# Patient Record
Sex: FEMALE | Race: WHITE | NOT HISPANIC OR LATINO | Employment: OTHER | URBAN - METROPOLITAN AREA
[De-identification: names, ages, dates, MRNs, and addresses within clinical notes are randomized per-mention and may not be internally consistent; named-entity substitution may affect disease eponyms.]

---

## 2017-03-09 ENCOUNTER — HOSPITAL ENCOUNTER (EMERGENCY)
Facility: CLINIC | Age: 82
Discharge: HOME | End: 2017-03-09
Attending: EMERGENCY MEDICINE | Admitting: EMERGENCY MEDICINE
Payer: MEDICARE

## 2017-03-09 ENCOUNTER — CARE COORDINATION (OUTPATIENT)
Dept: CARDIOLOGY | Facility: CLINIC | Age: 82
End: 2017-03-09

## 2017-03-09 ENCOUNTER — APPOINTMENT (OUTPATIENT)
Dept: CT IMAGING | Facility: CLINIC | Age: 82
End: 2017-03-09
Attending: EMERGENCY MEDICINE
Payer: MEDICARE

## 2017-03-09 ENCOUNTER — TELEPHONE (OUTPATIENT)
Dept: INTERNAL MEDICINE | Facility: CLINIC | Age: 82
End: 2017-03-09

## 2017-03-09 VITALS
DIASTOLIC BLOOD PRESSURE: 66 MMHG | SYSTOLIC BLOOD PRESSURE: 122 MMHG | HEART RATE: 70 BPM | WEIGHT: 123.9 LBS | BODY MASS INDEX: 22.66 KG/M2 | RESPIRATION RATE: 16 BRPM | OXYGEN SATURATION: 99 % | TEMPERATURE: 98.6 F

## 2017-03-09 DIAGNOSIS — I48.0 PAROXYSMAL ATRIAL FIBRILLATION (H): ICD-10-CM

## 2017-03-09 LAB
ANION GAP SERPL CALCULATED.3IONS-SCNC: 9 MMOL/L (ref 3–14)
BASOPHILS # BLD AUTO: 0.1 10E9/L (ref 0–0.2)
BASOPHILS NFR BLD AUTO: 0.9 %
BUN SERPL-MCNC: 18 MG/DL (ref 7–30)
CALCIUM SERPL-MCNC: 8.4 MG/DL (ref 8.5–10.1)
CHLORIDE SERPL-SCNC: 105 MMOL/L (ref 94–109)
CO2 SERPL-SCNC: 27 MMOL/L (ref 20–32)
CREAT SERPL-MCNC: 0.53 MG/DL (ref 0.52–1.04)
D DIMER PPP FEU-MCNC: 0.9 UG/ML FEU (ref 0–0.5)
DIFFERENTIAL METHOD BLD: NORMAL
EOSINOPHIL # BLD AUTO: 0.1 10E9/L (ref 0–0.7)
EOSINOPHIL NFR BLD AUTO: 1.2 %
ERYTHROCYTE [DISTWIDTH] IN BLOOD BY AUTOMATED COUNT: 12.6 % (ref 10–15)
GFR SERPL CREATININE-BSD FRML MDRD: ABNORMAL ML/MIN/1.7M2
GLUCOSE SERPL-MCNC: 161 MG/DL (ref 70–99)
HCT VFR BLD AUTO: 39.1 % (ref 35–47)
HGB BLD-MCNC: 12.5 G/DL (ref 11.7–15.7)
IMM GRANULOCYTES # BLD: 0.1 10E9/L (ref 0–0.4)
IMM GRANULOCYTES NFR BLD: 1 %
LYMPHOCYTES # BLD AUTO: 2.7 10E9/L (ref 0.8–5.3)
LYMPHOCYTES NFR BLD AUTO: 30.4 %
MCH RBC QN AUTO: 29.1 PG (ref 26.5–33)
MCHC RBC AUTO-ENTMCNC: 32 G/DL (ref 31.5–36.5)
MCV RBC AUTO: 91 FL (ref 78–100)
MONOCYTES # BLD AUTO: 0.8 10E9/L (ref 0–1.3)
MONOCYTES NFR BLD AUTO: 8.3 %
NEUTROPHILS # BLD AUTO: 5.3 10E9/L (ref 1.6–8.3)
NEUTROPHILS NFR BLD AUTO: 58.2 %
NRBC # BLD AUTO: 0 10*3/UL
NRBC BLD AUTO-RTO: 0 /100
PLATELET # BLD AUTO: 392 10E9/L (ref 150–450)
POTASSIUM SERPL-SCNC: 3.9 MMOL/L (ref 3.4–5.3)
RBC # BLD AUTO: 4.3 10E12/L (ref 3.8–5.2)
SODIUM SERPL-SCNC: 141 MMOL/L (ref 133–144)
TROPONIN I SERPL-MCNC: NORMAL UG/L (ref 0–0.04)
TSH SERPL DL<=0.05 MIU/L-ACNC: 2 MU/L (ref 0.4–4)
WBC # BLD AUTO: 9 10E9/L (ref 4–11)

## 2017-03-09 PROCEDURE — 80048 BASIC METABOLIC PNL TOTAL CA: CPT | Performed by: EMERGENCY MEDICINE

## 2017-03-09 PROCEDURE — 25000128 H RX IP 250 OP 636: Performed by: EMERGENCY MEDICINE

## 2017-03-09 PROCEDURE — 99285 EMERGENCY DEPT VISIT HI MDM: CPT | Mod: 25

## 2017-03-09 PROCEDURE — 85025 COMPLETE CBC W/AUTO DIFF WBC: CPT | Performed by: EMERGENCY MEDICINE

## 2017-03-09 PROCEDURE — 25500064 ZZH RX 255 OP 636: Performed by: EMERGENCY MEDICINE

## 2017-03-09 PROCEDURE — 25000132 ZZH RX MED GY IP 250 OP 250 PS 637: Mod: GY | Performed by: EMERGENCY MEDICINE

## 2017-03-09 PROCEDURE — 96372 THER/PROPH/DIAG INJ SC/IM: CPT | Mod: 59

## 2017-03-09 PROCEDURE — 84443 ASSAY THYROID STIM HORMONE: CPT | Performed by: EMERGENCY MEDICINE

## 2017-03-09 PROCEDURE — 96374 THER/PROPH/DIAG INJ IV PUSH: CPT | Mod: 59

## 2017-03-09 PROCEDURE — 25000125 ZZHC RX 250: Performed by: EMERGENCY MEDICINE

## 2017-03-09 PROCEDURE — A9270 NON-COVERED ITEM OR SERVICE: HCPCS | Mod: GY | Performed by: EMERGENCY MEDICINE

## 2017-03-09 PROCEDURE — 93005 ELECTROCARDIOGRAM TRACING: CPT

## 2017-03-09 PROCEDURE — 84484 ASSAY OF TROPONIN QUANT: CPT | Performed by: EMERGENCY MEDICINE

## 2017-03-09 PROCEDURE — 96361 HYDRATE IV INFUSION ADD-ON: CPT

## 2017-03-09 PROCEDURE — 85379 FIBRIN DEGRADATION QUANT: CPT | Performed by: EMERGENCY MEDICINE

## 2017-03-09 PROCEDURE — 71260 CT THORAX DX C+: CPT

## 2017-03-09 RX ORDER — IOPAMIDOL 755 MG/ML
500 INJECTION, SOLUTION INTRAVASCULAR ONCE
Status: COMPLETED | OUTPATIENT
Start: 2017-03-09 | End: 2017-03-09

## 2017-03-09 RX ORDER — LIDOCAINE 40 MG/G
CREAM TOPICAL
Status: DISCONTINUED | OUTPATIENT
Start: 2017-03-09 | End: 2017-03-09 | Stop reason: HOSPADM

## 2017-03-09 RX ORDER — SODIUM CHLORIDE 9 MG/ML
1000 INJECTION, SOLUTION INTRAVENOUS CONTINUOUS
Status: DISCONTINUED | OUTPATIENT
Start: 2017-03-09 | End: 2017-03-09 | Stop reason: HOSPADM

## 2017-03-09 RX ORDER — WARFARIN SODIUM 5 MG/1
5 TABLET ORAL DAILY
Qty: 7 TABLET | Refills: 0 | Status: SHIPPED | OUTPATIENT
Start: 2017-03-09 | End: 2017-03-15

## 2017-03-09 RX ORDER — DILTIAZEM HYDROCHLORIDE 5 MG/ML
15 INJECTION INTRAVENOUS ONCE
Status: COMPLETED | OUTPATIENT
Start: 2017-03-09 | End: 2017-03-09

## 2017-03-09 RX ORDER — WARFARIN SODIUM 5 MG/1
5 TABLET ORAL
Status: COMPLETED | OUTPATIENT
Start: 2017-03-09 | End: 2017-03-09

## 2017-03-09 RX ORDER — METOPROLOL TARTRATE 25 MG/1
12.5 TABLET, FILM COATED ORAL 2 TIMES DAILY
Qty: 7 TABLET | Refills: 0 | Status: SHIPPED | OUTPATIENT
Start: 2017-03-09 | End: 2017-03-15

## 2017-03-09 RX ADMIN — ENOXAPARIN SODIUM 60 MG: 60 INJECTION SUBCUTANEOUS at 19:56

## 2017-03-09 RX ADMIN — SODIUM CHLORIDE 1000 ML: 9 INJECTION, SOLUTION INTRAVENOUS at 16:45

## 2017-03-09 RX ADMIN — SODIUM CHLORIDE 73 ML: 9 INJECTION, SOLUTION INTRAVENOUS at 17:51

## 2017-03-09 RX ADMIN — WARFARIN SODIUM 5 MG: 5 TABLET ORAL at 19:57

## 2017-03-09 RX ADMIN — DILTIAZEM HYDROCHLORIDE 15 MG: 5 INJECTION INTRAVENOUS at 16:45

## 2017-03-09 RX ADMIN — IOPAMIDOL 56 ML: 755 INJECTION, SOLUTION INTRAVENOUS at 17:51

## 2017-03-09 ASSESSMENT — ENCOUNTER SYMPTOMS
SHORTNESS OF BREATH: 0
COUGH: 0
PALPITATIONS: 1
FATIGUE: 0
DIZZINESS: 0
FEVER: 0
CHEST TIGHTNESS: 0
SLEEP DISTURBANCE: 0

## 2017-03-09 NOTE — ED AVS SNAPSHOT
[facility]    [address]    [facility] [address]    Phone:  [phone]    Fax:  [phone]                                       Frannie Roa   MRN: [id number]    Department:  [facility]   Date of Visit:  3/9/2017           Patient Information     Date Of Birth          1935        Your diagnoses for this visit were:     Paroxysmal atrial fibrillation (H)        You were seen by Jerald Rosen MD.      Follow-up Information     Follow up with VICKI Felder MD. Go in 2 days.    Specialty:  Cardiology    Contact information:     PHYSICIANS  [address] [facility] [address]  [facility] [address] [phone]          Follow up with [facility].    Specialty:  EMERGENCY MEDICINE    Why:  If symptoms worsen    Contact information:    [address]  [facility] [id number] [phone]        Discharge Instructions       Follow-up:  Please follow-up with Cardiology as previously scheduled in 2 days for re-evaluation and discussion of your visit to the emergency department today.    Home treatments:  Recommended home therapies include close monitoring of your symptoms, begin metoprolol twice daily, and follow-up on Saturday to discuss blood thinning medications.    New prescriptions:  Metoprolol    Return precautions:  Warning signs which should prompt you to return to the ER include worsening palpitations, shortness of breath, chest pain, numbness/weakness/slurred speech, vision changes, or any other new or troubling symptoms.  We are always happy to see you again.        Discharge Instructions for Atrial Fibrillation  You have been diagnosed with atrial fibrillation. With this condition, your heart s two upper chambers quiver rather than squeeze the blood out in a normal pattern. This leads to an irregular and sometimes rapid heartbeat. Some people will develop  associated symptoms such as a flip-flopping heartbeat, lightheadedness, or shortness of breath. Other people may have no symptoms at all. Atrial fibrillation is serious because it affects the heart s ability to fill with blood as it should. Blood clots may form. This increases the risk for stroke. Untreated atrial fibrillation can also lead to heart failure. Atrial fibrillation can be controlled. With treatment, most people with atrial fibrillation lead normal lives. It is estimated that over 2.5 million Americans have atrial fibrillation.  Treatment options  Recommended treatment for atrial fibrillation depends on your age, symptoms, how long you have had atrial fibrillation, and other factors. You will have a complete evaluation to find out if you have any abnormalities that caused your heart to go into atrial fibrillation. This might be blocked heart arteries or a thyroid problem. Your doctor will assess your particular case and discuss choices with you.  Treatment choices may include:    Treating an underlying disorder that puts you at risk for atrial fibrillation. For example, correcting an abnormal thyroid or electrolyte problem, or treating a blocked heart artery.    Restoring a normal heart rhythm with an electrical shock (cardioversion) or with an antiarrhythmic medicine (chemical cardioversion)    Using medication to control your heart rate in atrial fibrillation.    Preventing the risk for blood clot and stroke using blood-thinning medicines. Your doctor will tell you what he or she recommends. Choices may include aspirin, clopidogrel, warfarin, dabigatran, rivaroxaban, or apixaban.    Doing catheter ablation or maze procedure. These use different methods to destroy certain areas of heart tissue. This interrupts the electrical signals causing atrial fibrillation. One of these procedures may be a choice when medicines do not work.    Other treatment choices may be recommended for you by your  doctor.  Managing risk factors for stroke and preventing heart failure are important parts of any treatment plan for atrial fibrillation.  Home care    Take your medicines exactly as directed. Don t skip doses.    Work with your doctor to find the right medicaines and doses for you.    Learn to take your own pulse. Keep a record of your results. Ask your doctor which pulse rates mean that you need medical attention. Slowing your pulse is often the goal of treatment. Ask your doctor if it s OK for you to use an automatic machine to check your pulse at home. Sometimes these machines don t count the pulse correctly when you have atrial fibrillation.    Limit your intake of coffee, tea, cola, and other beverages with caffeine to 2 cups per day. Talk with your doctor about whether you should eliminate caffeine.    Avoid over-the-counter medicines that have caffeine in them.    Let your doctor know what medicines you take, including prescription and over-the-counter medicines, as well as any supplements. They interfere with some medicines given for atrial fibrillation.    Ask your doctor about whether you can drink alcohol. Some people need to avoid alcohol to better treat atrial fibrillation. If you are taking blood-thinner medicines, alcohol may interfere with them by increasing their effect.    Never take stimulants such as amphetamines or cocaine. These drugs can speed up your heart rate and trigger atrial fibrillation.  Follow-up  Make a follow-up appointment as directed by our staff.     When to call your doctor  Call your doctor immediately if you have any of the following:    Weakness    Dizziness    Fainting    Fatigue    Shortness of breath    Chest pain with increased activity    A change in the usual regularity of your heartbeat, or an unusually fast heartbeat     4994-8970 [employer]. [address] [place] [address], [place] [address]. All rights reserved. This information is not intended as a  substitute for professional medical care. Always follow your healthcare professional's instructions.              Future Appointments        Provider Department Dept Phone Center    3/11/2017 9:00 AM VICKI Felder MD [facility] [phone] [facility]      24 Hour Appointment Hotline       To make an appointment at any [facility], call [phone] ([phone]). If you don't have a family doctor or clinic, we will help you find one. [facility] are conveniently located to serve the needs of you and your family.             Review of your medicines      START taking        Dose / Directions Last dose taken    enoxaparin 60 MG/0.6ML injection   Commonly known as:  LOVENOX   Dose:  1 mg/kg   Quantity:  5.6 mL        Inject 0.56 mLs (56 mg) Subcutaneous 2 times daily for 5 days   Refills:  0        metoprolol 25 MG tablet   Commonly known as:  LOPRESSOR   Dose:  12.5 mg   Quantity:  7 tablet        Take 0.5 tablets (12.5 mg) by mouth 2 times daily for 7 days   Refills:  0        warfarin 5 MG tablet   Commonly known as:  COUMADIN   Dose:  5 mg   Quantity:  7 tablet        Take 1 tablet (5 mg) by mouth daily for 7 days   Refills:  0          Our records show that you are taking the medicines listed below. If these are incorrect, please call your family doctor or clinic.        Dose / Directions Last dose taken    aspirin 81 MG tablet   Dose:  81 mg   Quantity:  100 tablet        Take 1 tablet (81 mg) by mouth daily   Refills:  3        CALCIUM + D PO        Take  by mouth.   Refills:  0        dorzolamide-timolol 2-0.5 % ophthalmic solution   Commonly known as:  COSOPT   Dose:  1 drop        1 drop 2 times daily.   Refills:  0        FLUZONE HIGH-DOSE 0.5 ML injection   Generic drug:  influenza Vac Split High-Dose        ADM 0.5ML IM UTD   Refills:  0        garlic 150 MG Tabs tablet   Dose:  150 mg        Take 150 mg by mouth daily   Refills:  0        MULTIVITAMINS PO        Take  by mouth.    Refills:  0        simvastatin 10 MG tablet   Commonly known as:  ZOCOR   Dose:  5 mg   Quantity:  45 tablet        Take 0.5 tablets (5 mg) by mouth At Bedtime   Refills:  3        travoprost Z (benzalkonium) 0.004 % ophthalmic solution   Commonly known as:  TRAVATAN Z   Dose:  1 drop        1 drop every evening.   Refills:  0        zinc sulfate 220 MG capsule   Commonly known as:  ZINCATE   Dose:  220 mg        Take 220 mg by mouth 2 times daily   Refills:  0                Prescriptions were sent or printed at these locations (3 Prescriptions)                   Other Prescriptions                Printed at Department/Unit printer (3 of 3)         metoprolol (LOPRESSOR) 25 MG tablet               enoxaparin (LOVENOX) 60 MG/0.6ML injection               warfarin (COUMADIN) 5 MG tablet                Procedures and tests performed during your visit     Procedure/Test Number of Times Performed    Basic metabolic panel 1    CBC with platelets differential 1    Cardiac Continuous Monitoring 1    Chest CT, IV contrast only - PE protocol 1    D dimer quantitative 1    EKG 12 lead 2    Peripheral IV: Standard 1    Pulse oximetry nursing 1    TSH 1    Troponin I 1      Orders Needing Specimen Collection     None      Pending Results     Date and Time Order Name Status Description    3/9/2017 1554 EKG 12 lead Preliminary             Pending Culture Results     No orders found from 3/7/2017 to 3/10/2017.             Test Results from your hospital stay     3/9/2017  4:34 PM - Interface, DataFlyte Results      Component Results     Component Value Ref Range & Units Status    WBC 9.0 4.0 - 11.0 10e9/L Final    RBC Count 4.30 3.8 - 5.2 10e12/L Final    Hemoglobin 12.5 11.7 - 15.7 g/dL Final    Hematocrit 39.1 35.0 - 47.0 % Final    MCV 91 78 - 100 fl Final    MCH 29.1 26.5 - 33.0 pg Final    MCHC 32.0 31.5 - 36.5 g/dL Final    RDW 12.6 10.0 - 15.0 % Final    Platelet Count 392 150 - 450 10e9/L Final    Diff Method Automated  Method  Final    % Neutrophils 58.2 % Final    % Lymphocytes 30.4 % Final    % Monocytes 8.3 % Final    % Eosinophils 1.2 % Final    % Basophils 0.9 % Final    % Immature Granulocytes 1.0 % Final    Nucleated RBCs 0 0 /100 Final    Absolute Neutrophil 5.3 1.6 - 8.3 10e9/L Final    Absolute Lymphocytes 2.7 0.8 - 5.3 10e9/L Final    Absolute Monocytes 0.8 0.0 - 1.3 10e9/L Final    Absolute Eosinophils 0.1 0.0 - 0.7 10e9/L Final    Absolute Basophils 0.1 0.0 - 0.2 10e9/L Final    Abs Immature Granulocytes 0.1 0 - 0.4 10e9/L Final    Absolute Nucleated RBC 0.0  Final         3/9/2017  4:43 PM - Interface, Flexilab Results      Component Results     Component Value Ref Range & Units Status    D Dimer 0.9 (H) 0.0 - 0.50 ug/ml FEU Final    This D-dimer assay is intended for use in conjuntion with a clinical pretest   probability assessment model to exclude pulmonary embolism (PE) and as an aid   in the diagnosis of deep venous thrombosis (DVT) in outpatients suspected of   PE   or DVT. The cut-off value is 0.5 g/mL FEU.           3/9/2017  4:51 PM - Interface, Flexilab Results      Component Results     Component Value Ref Range & Units Status    Sodium 141 133 - 144 mmol/L Final    Potassium 3.9 3.4 - 5.3 mmol/L Final    Chloride 105 94 - 109 mmol/L Final    Carbon Dioxide 27 20 - 32 mmol/L Final    Anion Gap 9 3 - 14 mmol/L Final    Glucose 161 (H) 70 - 99 mg/dL Final    Urea Nitrogen 18 7 - 30 mg/dL Final    Creatinine 0.53 0.52 - 1.04 mg/dL Final    GFR Estimate >90  Non  GFR Calc   >60 mL/min/1.7m2 Final    GFR Estimate If Black >90   GFR Calc   >60 mL/min/1.7m2 Final    Calcium 8.4 (L) 8.5 - 10.1 mg/dL Final         3/9/2017  4:51 PM - Interface, Flexilab Results      Component Results     Component Value Ref Range & Units Status    Troponin I ES  0.000 - 0.045 ug/L Final    <0.015  The 99th percentile for upper reference range is 0.045 ug/L.  Troponin values in   the range of 0.045 -  0.120 ug/L may be associated with risks of adverse   clinical events.           3/9/2017  6:11 PM - Interface, Radiant Ib      Narrative     CT CHEST PULMONARY EMBOLISM WITH CONTRAST 3/9/2017 6:00 PM     HISTORY: Fatigued, shortness of breath with exertion, D-dimer, travel.    TECHNIQUE: Thin section axial images are performed from the thoracic  inlet to the lung bases utilizing 56 mL of Isovue 370 IV contrast  without adverse event. Coronal reformatted images are also generated.  Radiation dose for this scan was reduced using automated exposure  control, adjustment of the mA and/or kV according to patient size, or  iterative reconstruction technique.    FINDINGS:     Chest:  Mild dependent atelectasis is present at the lung bases. Lungs  are otherwise clear. No infiltrate or consolidation. No pleural or  pericardial fluid. Heart size is within normal limits. Prominent  mitral annular calcification is present. Left atrium appears slightly  prominent in size. The esophagus is unremarkable. Thyroid gland  appears normal in size where imaged. No enlarged lymph nodes. No  evidence of pulmonary embolism. Thoracic aorta demonstrates a few  scattered calcified plaques. No aneurysm or dissection. Limited images  upper abdomen are within normal limits. Bone window examination is  unremarkable.        Impression     IMPRESSION:  1. No evidence of pulmonary embolism. Thoracic aorta is unremarkable  with a few scattered calcified plaques.  2. Lungs are clear. No infiltrate or consolidation. No enlarged lymph  nodes.  3. Prominent mitral annular calcification with questionable left  atrial enlargement. Follow-up cardiac echo is clinically warranted for  further assessment.    YEIMI ARZOLA MD         3/9/2017  5:51 PM - Interface, Flexilab Results      Component Results     Component Value Ref Range & Units Status    TSH 2.00 0.40 - 4.00 mU/L Final                Clinical Quality Measure: Blood Pressure Screening     Your blood  "pressure was checked while you were in the emergency department today. The last reading we obtained was  BP: 124/71 . Please read the guidelines below about what these numbers mean and what you should do about them.  If your systolic blood pressure (the top number) is less than 120 and your diastolic blood pressure (the bottom number) is less than 80, then your blood pressure is normal. There is nothing more that you need to do about it.  If your systolic blood pressure (the top number) is 120-139 or your diastolic blood pressure (the bottom number) is 80-89, your blood pressure may be higher than it should be. You should have your blood pressure rechecked within a year by a primary care provider.  If your systolic blood pressure (the top number) is 140 or greater or your diastolic blood pressure (the bottom number) is 90 or greater, you may have high blood pressure. High blood pressure is treatable, but if left untreated over time it can put you at risk for heart attack, stroke, or kidney failure. You should have your blood pressure rechecked by a primary care provider within the next 4 weeks.  If your provider in the emergency department today gave you specific instructions to follow-up with your doctor or provider even sooner than that, you should follow that instruction and not wait for up to 4 weeks for your follow-up visit.        Thank you for choosing [facility]       Thank you for choosing [facility] for your care. Our goal is always to provide you with excellent care. Hearing back from our patients is one way we can continue to improve our services. Please take a few minutes to complete the written survey that you may receive in the mail after you visit with us. Thank you!        [facility] Information     [employer] lets you send messages to your doctor, view your test results, renew your prescriptions, schedule appointments and more. To sign up, go to [email or web address] . Click on \"Log in\" on the left side " "of the screen, which will take you to the Welcome page. Then click on \"Sign up Now\" on the right side of the page.     You will be asked to enter the access code listed below, as well as some personal information. Please follow the directions to create your username and password.     Your access code is: [email or web address]  Expires: 2017  6:53 PM     Your access code will  in 90 days. If you need help or a new code, please call your [facility] clinic or [phone].        Care EveryWhere ID     This is your Care EveryWhere ID. This could be used by other organizations to access your [facility] medical records  [id number]        After Visit Summary       This is your record. Keep this with you and show to your community pharmacist(s) and doctor(s) at your next visit.                  "

## 2017-03-09 NOTE — PROGRESS NOTES
"Patient calling nurse triage line asking to speak with Dr. Altamirano or to his nurse asap due to \"palpitations and my heart racing into the 120s\" although she does state \"it is not urgent\"    Patient stated that she has just gotten over bronchitis.  She also stated that she has an appointment in April to see Dr. Altamirano.      Instructed patient that if symptoms get worse to go to ER, but otherwise we have an opening this Saturday with Dr. Mayo at 9am if she would like to take that    She stated she will take that 9am 3/11 Dr. Mayo clinic spot and agreed and verbalized understanding to go to ER if symptoms progress, get worse or are unmanageable  "

## 2017-03-09 NOTE — ED NOTES
Patient presents complaining of tachycardia and palpitations. She reports that her heart rate was up as high as 150 bpm per her heart monitor she sometimes wears at home. She denies chest pain, shortness of breath, or other symptoms at this time. ABCs intact.

## 2017-03-09 NOTE — ED PROVIDER NOTES
History     Chief Complaint:  Tachycardia    HPI   Frannie Roa is a 81 year old female who presents to the emergency department today for evaluation of tachycardia. The patient reports two weeks ago she was diagnosed and treated for bronchitis. She has overall improved but when her fever started to subside she had an episode of tachycardia in the 160's and palpitations around 8-9 days ago. She rested and her heart rate eventually subsided to the 80's. Then for the past three days the patient reports intromittent episodes of tachycardia and palpitations with heart rate increased to around 150 on her heart monitor compared to her baseline of 65. Her symptoms usually subside when she lays down. Earlier today, she noticed her heart of 120 around 1300 after moving things around her home in which she called her primary clinic and referred to the ED for further evaluation. The patient recently took a return flight home from [place] two weeks ago. She denies dizziness, fatigue, and chest pain or pressure. Of note, she has an appointment in two days with cardiology to have an echocardiogram performed.  She has previously followed with Dr. Altamirano of cardiology for mitral valve disease.  She has no prior hx of atrial fibrillation and is not on blood thinning medications.  She has no prior hx of thyroid abnormalities.  She has had a few episodes of self limited SVT, which were noted on a cardiac event monitor in 2014, though nothing additional was required per the patient.     Cardiac/PE/DVT Risk Factors:  History of hypertension - Negative  History of hyperlipidemia - Positive  History of diabetes - Negative  History of smoking - Negative  Personal history of PE/DVT - Negative  Family history of PE/DVT - Negative  Family history of heart complications - Positive  Recent travel - Positive  Recent surgery - Negative  Other immobilizations - Negative  Cancer - Negative    Allergies:  Penicillins  Sulfa Drugs      Medications:    Zocor  Aspirin  Zincate  Cosopt  Travatan    Past Medical History:    Arthritis  Glaucoma  Hyperlipidemia  Myxomatous mitral valve regurgitation  SVT  Systolic murmur     Past Surgical History:    T&A  Breat biopsy  Eye surgery  Orthopedic surgery - right ankle cyst  Phacoemulsification clear cornea w/ standard iol implant, endoscopic cyclophotocoagulation, combined    Family History:    Colon cancer  MI  Leukemia     Social History:  The patient was accompanied to the ED by her .  Smoking Status: Never  Smokeless Tobacco: Never  Alcohol Use: 3 glasses of wine/week  Marital Status:        Review of Systems   Constitutional: Negative for fatigue and fever.   Respiratory: Negative for cough, chest tightness and shortness of breath.    Cardiovascular: Positive for palpitations. Negative for chest pain and leg swelling.   Neurological: Negative for dizziness.   Psychiatric/Behavioral: Negative for sleep disturbance.   All other systems reviewed and are negative.    Physical Exam   First Vitals:  BP: (!) 108/95  Pulse: 140  Temp: 98.6  F (37  C)  Resp: 20  SpO2: 98 %      Physical Exam  General:   Well-nourished   Speaking in full sentences   Well appearing, answers questions appropriately  Eyes:   Conjunctiva without injection or scleral icterus   PERRL  ENT:   Moist mucous membranes   Posterior oropharynx clear without erythema or exudate   TM translucent and gray bilaterally   Nares patent   Pinnae normal  Neck:   Full ROM   No stiffness appreciated  Resp:   Lungs CTAB   No crackles, wheezing or audible rubs   Good air movement  CV:    Irregularly irregular rate and rhythm   S1 and S2 present   II/VI systolic murmur  GI:   BS present   Abdomen soft without distention   Non-tender to light and deep palpation   No guarding or rebound tenderness  Skin:   Warm, dry, well perfused   No rashes or open wounds on exposed skin  MSK:   Moves all extremities   No focal deformities or  swelling   No calf asymmetry  Neuro:   Alert   Answers questions appropriately   Moves all extremities equally   Gait stable  Psych:   Normal affect, normal mood        Emergency Department Course     ECG:  ECG taken at 1540, ECG read at 1502  Atrial fibrillation with rapid ventricular response  Nonspecific ST and T wave abnormality  Abnormal ECG  Rate 122 bpm. DC interval *. QRS duration 80. QT/QTc 324/461. P-R-T axes * 32 94.    ECG taken at 1726, ECG read at 1749  Normal sinus rhythm  Normal ECG  Rate 67 bpm. DC interval 202. QRS duration 82. QT/QTc 418/441. P-R-T axes 2 26 50.    Imaging:  Radiology findings were communicated with the patient and family who voiced understanding of the findings.  Chest CT IV Contrast Only PE Protocol  IMPRESSION:  1. No evidence of pulmonary embolism. Thoracic aorta is unremarkable  with a few scattered calcified plaques.  2. Lungs are clear. No infiltrate or consolidation. No enlarged lymph  nodes.  3. Prominent mitral annular calcification with questionable left  atrial enlargement. Follow-up cardiac echo is clinically warranted for  further assessment.  Report per radiology     Laboratory:  Laboratory findings were communicated with the patient and family who voiced understanding of the findings.  CBC: WNL. (WBC 9.0, HGB 12.5, )   D Dimer (Collected 1622): 0.9(H)  BMP:  Glucose 161(H), Calcium 8.4(L) o/w WNL (Creatinine 0.53)  Troponin (Collected 1622): <0.015    Interventions:  1645 NS 1,000mL IV  1645 Cardizem 15mg IV     Emergency Department Course:  Nursing notes and vitals reviewed.  The patient was sent for a Chest CT IV Contrast Only PE Protocol while in the emergency department, results above.   IV was inserted and blood was drawn for laboratory testing, results above.  1615: I performed an exam of the patient as documented above.   1704: Patient rechecked and is back in sinus rhythm  1815:  I spoke with Dr. Vasquez of the cardiology service from the [facility]  regarding patient's presentation, findings, and plan of care.  I discussed the treatment plan with the patient. They expressed understanding of this plan and consented to discharge. They will be discharged home with instructions for care and follow up. In addition, the patient will return to the emergency department if their symptoms persist, worsen, if new symptoms arise or if there is any concern.  All questions were answered.  I personally reviewed the laboratory and imaging results with the Patient and spouse and answered all related questions prior to discharge.    Impression & Plan      Medical Decision Making:  Frannie Roa is a very pleasant 81 yr old F with a PMH significant for HLD and mitral valve regurgitation who presents to the ER accompanied by her  for evaluation of tachycardia.  VS on presentation reveal HR of 140, though are otherwise unremarkable.  Hx, exam, and ED course consistent with new onset atrial fibrillation.  Exact time with which patient has been in atrial fibrillation is difficult to determine, as she otherwise denies notable symptoms.  Rather, she has primarily noted her HR reading high on her HR watch.  Etiology of new onset dysrhythmia possibly related to recent URI/bronchitis.  No other infectious process identified by history or on exam.  PE strongly considered given pt report of fatigue, as well [place].  D-dimer returned elevated prompting CT of the chest, which reveals no evidence of PE or acute thoracic abnormality, though note is made of mitral annular calcification with LAE.  Known mitral dysfunction may be underlying etiology as well.  ACS considered though unlikely to cause dysrhythmia in the absence of CP, chest pressure, and unremarkable EKG post-conversion (unchanged compared with prior EKG in system).  Thyroid function and metabolic function otherwise unremarkable.    During ED course, initial attention directed towards rate control  (symptoms likely present >48 hours).  No CP, AMS, nor hemodynamic instability warranting emergent cardioversion.  After 1 dose of IV diltiazem, pt converted to sinus rhythm.  [id number] score is calculated to be 3 given age and gender.  Case was discussed with Dr. Vasquez of cardiology through the [facility].  Pt already has appointment established for Saturday of this week for follow-up.  Given her conversion to sinus rhythm, reassuring work-up, absence of additional symptoms, and close follow-up already arranged (36 hrs), do feel outpatient management is safe and reasonable at this time.  We had a long discussion regarding anticoagulation, and given patient's elevated CHADs score above, do feel patient would be appropriate candidate for initiation of anticoagulation.  This too was recommended by cardiology.  Patient and myself had a thorough discussion of anticoagulation options including coumadin vs NOACs, though at this point, are electing to proceed with Coumadin (less expensive, and able to be reversed).  Initial dosages were provided from the ED, and pt /  felt comfortable with self administration.  Teaching taught by pharmacy staff.  Will provide prescriptions for LMWH bridge until INR becomes therapeutic as well as low dose BB (metoprolol 12.5 mg BID) as recommended by cardiology (no hx of asthma nor COPD).  Pt encouraged to hold on baby ASA until follow-up with cardiology apt in 2 days to discuss need for dual anticoagulation therapy.  Pt informed of risks of bleeding while on blood thinning medications, and should she develop side effects such as bloody stool, severe headache, head injury, she should seek immediate evaluation.  Pt felt comfortable with this plan of care.  Return to ER with recurrent palpitations, CP, SOB, abdominal pain, neurologic symptoms, or any other new or troubling symptoms.  All questions answered prior to DC.    Diagnosis:    ICD-10-CM    1. Paroxysmal atrial fibrillation (H)  I48.0        Disposition:   Home with cardiology follow-up in 2 days at the [facility].    Discharge Medications:  Discharge Medication List as of 3/9/2017  7:54 PM      START taking these medications    Details   metoprolol (LOPRESSOR) 25 MG tablet Take 0.5 tablets (12.5 mg) by mouth 2 times daily for 7 days, Disp-7 tablet, R-0, Local Print      enoxaparin (LOVENOX) 60 MG/0.6ML injection Inject 0.56 mLs (56 mg) Subcutaneous 2 times daily for 5 days, Disp-5.6 mL, R-0, Local Print      warfarin (COUMADIN) 5 MG tablet Take 1 tablet (5 mg) by mouth daily for 7 days, Disp-7 tablet, R-0, Local Print             Scribe Disclosure:  [place]ia DA SILVA, am serving as a scribe at 3:55 PM on 3/9/2017 to document services personally performed by Jerald Rosen MD, based on my observations and the provider's statements to me.    3/9/2017   [facility] EMERGENCY DEPARTMENT       Jerald Rosen MD  03/09/17 [id number]

## 2017-03-09 NOTE — ED AVS SNAPSHOT
[facility]    [address]    [facility] [address]    Phone:  [phone]    Fax:  [phone]                                       Frannie Roa   MRN: [id number]    Department:  [facility]   Date of Visit:  3/9/2017           After Visit Summary Signature Page     I have received my discharge instructions, and my questions have been answered. I have discussed any challenges I see with this plan with the nurse or doctor.    ..........................................................................................................................................  Patient/Patient Representative Signature      ..........................................................................................................................................  Patient Representative Print Name and Relationship to Patient    ..................................................               ................................................  Date                                            Time    ..........................................................................................................................................  Reviewed by Signature/Title    ...................................................              ..............................................  Date                                                            Time

## 2017-03-09 NOTE — TELEPHONE ENCOUNTER
"Patient calls with complaints of intermittent heart racing \"since I had bronchitis\" 2 wks ago \"and I have a bad valve\".  In the past week she has had episodes where heart rate was 120-140. States she normally runs 67-75 but today has not gone below 101 and has been as high as 140.  Can feel heart racing for the past hour, denies SOB, dizziness or CP.  Tried calling cardiologist who is out of office and nurse suggested she call her PCP office.  Patient asking for guidance from provider.  DIANDRA Catherine R.N.    "

## 2017-03-10 ENCOUNTER — PRE VISIT (OUTPATIENT)
Dept: CARDIOLOGY | Facility: CLINIC | Age: 82
End: 2017-03-10

## 2017-03-10 DIAGNOSIS — R00.2 PALPITATIONS: Primary | ICD-10-CM

## 2017-03-10 LAB
INTERPRETATION ECG - MUSE: NORMAL
INTERPRETATION ECG - MUSE: NORMAL

## 2017-03-10 NOTE — ED NOTES
PT returned from imaging. Applied monitoring devices (EKG, BP, and pulse ox) onto Patient.  Call light within reach.

## 2017-03-10 NOTE — DISCHARGE INSTRUCTIONS
Follow-up:  Please follow-up with Cardiology as previously scheduled in 2 days for re-evaluation and discussion of your visit to the emergency department today.    Home treatments:  Recommended home therapies include close monitoring of your symptoms, begin metoprolol twice daily, and follow-up on Saturday to discuss blood thinning medications.    New prescriptions:  Metoprolol    Return precautions:  Warning signs which should prompt you to return to the ER include worsening palpitations, shortness of breath, chest pain, numbness/weakness/slurred speech, vision changes, or any other new or troubling symptoms.  We are always happy to see you again.        Discharge Instructions for Atrial Fibrillation  You have been diagnosed with atrial fibrillation. With this condition, your heart s two upper chambers quiver rather than squeeze the blood out in a normal pattern. This leads to an irregular and sometimes rapid heartbeat. Some people will develop associated symptoms such as a flip-flopping heartbeat, lightheadedness, or shortness of breath. Other people may have no symptoms at all. Atrial fibrillation is serious because it affects the heart s ability to fill with blood as it should. Blood clots may form. This increases the risk for stroke. Untreated atrial fibrillation can also lead to heart failure. Atrial fibrillation can be controlled. With treatment, most people with atrial fibrillation lead normal lives. It is estimated that over 2.5 million Americans have atrial fibrillation.  Treatment options  Recommended treatment for atrial fibrillation depends on your age, symptoms, how long you have had atrial fibrillation, and other factors. You will have a complete evaluation to find out if you have any abnormalities that caused your heart to go into atrial fibrillation. This might be blocked heart arteries or a thyroid problem. Your doctor will assess your particular case and discuss choices with you.  Treatment  choices may include:    Treating an underlying disorder that puts you at risk for atrial fibrillation. For example, correcting an abnormal thyroid or electrolyte problem, or treating a blocked heart artery.    Restoring a normal heart rhythm with an electrical shock (cardioversion) or with an antiarrhythmic medicine (chemical cardioversion)    Using medication to control your heart rate in atrial fibrillation.    Preventing the risk for blood clot and stroke using blood-thinning medicines. Your doctor will tell you what he or she recommends. Choices may include aspirin, clopidogrel, warfarin, dabigatran, rivaroxaban, or apixaban.    Doing catheter ablation or maze procedure. These use different methods to destroy certain areas of heart tissue. This interrupts the electrical signals causing atrial fibrillation. One of these procedures may be a choice when medicines do not work.    Other treatment choices may be recommended for you by your doctor.  Managing risk factors for stroke and preventing heart failure are important parts of any treatment plan for atrial fibrillation.  Home care    Take your medicines exactly as directed. Don t skip doses.    Work with your doctor to find the right medicaines and doses for you.    Learn to take your own pulse. Keep a record of your results. Ask your doctor which pulse rates mean that you need medical attention. Slowing your pulse is often the goal of treatment. Ask your doctor if it s OK for you to use an automatic machine to check your pulse at home. Sometimes these machines don t count the pulse correctly when you have atrial fibrillation.    Limit your intake of coffee, tea, cola, and other beverages with caffeine to 2 cups per day. Talk with your doctor about whether you should eliminate caffeine.    Avoid over-the-counter medicines that have caffeine in them.    Let your doctor know what medicines you take, including prescription and over-the-counter medicines, as well as  any supplements. They interfere with some medicines given for atrial fibrillation.    Ask your doctor about whether you can drink alcohol. Some people need to avoid alcohol to better treat atrial fibrillation. If you are taking blood-thinner medicines, alcohol may interfere with them by increasing their effect.    Never take stimulants such as amphetamines or cocaine. These drugs can speed up your heart rate and trigger atrial fibrillation.  Follow-up  Make a follow-up appointment as directed by our staff.     When to call your doctor  Call your doctor immediately if you have any of the following:    Weakness    Dizziness    Fainting    Fatigue    Shortness of breath    Chest pain with increased activity    A change in the usual regularity of your heartbeat, or an unusually fast heartbeat     0283-6326 [employer]. [address] [place] [address], [place] [address]. All rights reserved. This information is not intended as a substitute for professional medical care. Always follow your healthcare professional's instructions.

## 2017-03-11 ENCOUNTER — OFFICE VISIT (OUTPATIENT)
Dept: CARDIOLOGY | Facility: CLINIC | Age: 82
End: 2017-03-11
Attending: INTERNAL MEDICINE
Payer: MEDICARE

## 2017-03-11 VITALS
HEIGHT: 63 IN | DIASTOLIC BLOOD PRESSURE: 74 MMHG | OXYGEN SATURATION: 99 % | WEIGHT: 123.3 LBS | BODY MASS INDEX: 21.85 KG/M2 | HEART RATE: 64 BPM | SYSTOLIC BLOOD PRESSURE: 118 MMHG

## 2017-03-11 DIAGNOSIS — Z79.01 LONG TERM CURRENT USE OF ANTICOAGULANT THERAPY: Primary | ICD-10-CM

## 2017-03-11 DIAGNOSIS — R00.2 PALPITATIONS: ICD-10-CM

## 2017-03-11 DIAGNOSIS — I48.91 ATRIAL FIBRILLATION (H): ICD-10-CM

## 2017-03-11 DIAGNOSIS — Z79.01 LONG TERM CURRENT USE OF ANTICOAGULANT THERAPY: ICD-10-CM

## 2017-03-11 LAB — INR PPP: 0.96 (ref 0.86–1.14)

## 2017-03-11 PROCEDURE — 36415 COLL VENOUS BLD VENIPUNCTURE: CPT | Performed by: INTERNAL MEDICINE

## 2017-03-11 PROCEDURE — 85610 PROTHROMBIN TIME: CPT | Performed by: INTERNAL MEDICINE

## 2017-03-11 PROCEDURE — 99214 OFFICE O/P EST MOD 30 MIN: CPT | Performed by: INTERNAL MEDICINE

## 2017-03-11 PROCEDURE — 99213 OFFICE O/P EST LOW 20 MIN: CPT | Mod: ZF

## 2017-03-11 ASSESSMENT — PAIN SCALES - GENERAL: PAINLEVEL: NO PAIN (0)

## 2017-03-11 NOTE — MR AVS SNAPSHOT
"              After Visit Summary   3/11/2017    Frannie Roa    MRN: [id number]           Patient Information     Date Of Birth          1935        Visit Information        Provider Department      3/11/2017 9:00 AM VICKI Felder MD M [facility]        Today's Diagnoses     Long term current use of anticoagulant therapy    -  1    Palpitations        Mitral regurgitation          Care Instructions    You were seen today in the Cardiovascular Clinic at the [facility].     Cardiology Providers you saw during your visit: Dr. Mayo       1.  INR today. If the INR is above 2 - you can stop your lovenox. [facility] for Coumadin/INR monitoring.   2.  Echocardiogram next week   2.  Please follow-up with Dr. Altamirano in the next 2 weeks. Michelle edmondson nurse will call you with an appt day and time.       Shiva Hammonds RN  [facility]  Cardiology Care Coordinator    Questions and [phone].   First press #1 for the [employer] and then press #3 for \"Medical Questions\" to reach us Cardiology Nurses.            Using Blood Thinners (Anticoagulants)  Blood thinners or anticoagulants are medicines that help prevent blood clots from forming. They include warfarin, heparin, dabigatran, rivaroxaban, apixaban,and edoxaban. Your health care provider will help you decide which medicine is best for you.    Taking an anticoagulant safely  When you are taking a blood thinner, you will need to take certain steps to stay safe. Too much blood thinner puts you at risk for bleeding. Too little puts you at risk for stroke. Follow these guidelines. Also follow any others that your health care provider gives you.    You may be told you need regular lab testing while taking these medicines. Warfarin requires routine testing to blood-thinning level testing while the other medicines do not.    Tell your doctor about all medicines you take. This includes over the " counter medicines, supplements, or herbal remedies. Do not take any medicines (including ones you buy over the counter) that your doctor doesn t know about. Some medicines can interact with blood thinners and cause serious problems.    Tell any health care provider that you see for care (such as doctors, dentists, chiropractors, home health nurses) that you take a blood thinner.    Carry a medical ID card or wear a medical-alert bracelet that says you take an anticoagulant.    Before taking aspirin, check with your doctor. Aspirin can significantly increase your risk of bleeding.    This medicine makes bleeding harder to stop. To protect yourself:    Avoid activities that may cause injury. If you fall or are injured, contact your health care provider right away. Blood thinners prevent clotting, so you could be bleeding inside without realizing it.    Use a soft-bristle toothbrush and waxed dental floss. Shave with an electric razor rather than a blade.    Don t go barefoot. Don t trim corns or calluses yourself.  Warfarin: Other important information  Several precautions are especially important when you are taking warfarin. Always keep these points in mind:    Be sure to follow your health care provider's instructions for taking warfarin.    Take this medicine at the same time each day. Take it with a full glass of water, with or without food. If you miss a dose, contact your doctor to find out how much to take. Avoid takinga double dose.    Warfarin is an effective drug, but it can be dangerous if not taken properly. It makes your blood less likely to form clots. If you take too much, it can cause serious internal or external bleeding.    You will need to have regular monitoring while you are taking warfarin. This includes blood tests to check your international normalized ratio (INR) and prothrombin time (PT). These tests show how quickly your blood clots. You will also have a complete blood count (CBC)  periodically. This looks at your blood and platelet levels. Both of these need to be followed while you're on warfarin. Talk with your health care provider about whether you need to visit the clinic every week, or if services are available for monitoring in your home.    Certain medicines can affect your INR and PT levels. Tell your health care provider if there are any changes in your medicines. This includes any over-the-counter medicines, supplements like vitamin K, or herbal remedies.    Your diet can also affect your INR and PT levels. Because of this, it's important to eat a consistent diet. It is especially important to eat a consistent amount of foods that are high in vitamin K. Be sure to talk with your health care provider before making any big changes in your diet.    Remember that warfarin increases your risk of bleeding. Be careful not to injure yourself. If you have a significant injury, contact your health care provider right away. It's important to alert your doctor if you've fallen or hurt yourself, even if you don't break your skin. You could be bleeding inside your body without realizing it.     Warfarin: Watch your INR/PT blood levels  Two tests are used to find out how your blood is clotting. One is protime (PT), the other is the international normalized ratio (INR).    Go for your blood tests (INR/PT) as often as directed. Your diet and the other medicines you take can affect your INR/PT levels.    Your INR was between ___ and ___.    Ask your doctor what your goal INR is. My goal INR is between ___ and ___.    My next INR/PT blood draw is due on _____________ (date) at ___________ (time) by ___________ (name of doctor or clinic).    The name of the doctor who is monitoring my anticoagulation therapy is _____________________ and the phone number is _________________.    Follow up with your doctor or as advised by his or her staff. It usually takes a few hours for your doctor to get the results  of your clotting tests. Call to get your lab results to find out if your doctor needs to make further changes to your warfarin dose.    If your blood is drawn for these tests at a location other than your doctor's office, tell your doctor as soon as you get your lab results.   Warfarin: Watch what you eat  Vitamin K helps your blood clot. So you have to watch how much you eat of foods that contain vitamin K. These foods can affect the way warfarin works. They do not affect the other non-warfarin blood thinners.Here are some specific tips:    Try to keep your diet about the same each day. If you change your diet for any reason, such as for illness or to lose weight, be sure to tell your doctor.    Each day, eat the same amount of foods that are high in vitamin K. These include asparagus, avocado, broccoli, cabbage, kale, spinach, and some other leafy green vegetables. Oils, such as soybean, canola, and olive oils, are also high in vitamin K.    Limit fats to 2 to 4 tablespoons a day.    Ask your health care provider if you should avoid alcohol while you are taking a blood thinner.    Avoid teas that contain sweet clover, sweet betty, or tonka beans. These can affect how your medicine works.    Talk with your health care provider and pharmacist about specific foods or special diets that can affect anticoagulant levels. These include grapefruit juice, cranberries and cranberry juice, fish oil supplements, garlic, deacon, licorice, turmeric, and herbal teas and supplements.  Talk with your health care provider if you have concerns about these or other food products and their effects on warfarin.     When to call your doctor if you re taking an anticoagulant  Call your doctor right away if you have any of these:    Bleeding that doesn t stop in 10 minutes    A heavier-than-normal menstrual period or bleeding between periods    Coughing or throwing up blood    Bloody diarrhea or bleeding hemorrhoids     Dark-colored  urine or black stools    Red or black-and-blue marks on the skin that get larger    Dizziness or fatigue    Chest pain or trouble breathing  Allergic reactions:    Rash    Itching    Swelling    Trouble swallowing or breathing   Medical conditions and anticoagulants  Before starting a blood thinner, be sure your doctor knows if you have any of these conditions:    Stomach ulcer now or in the past    Vomited blood or had bloody stools (black or red color)    Aneurysm, pericarditis, or pericardial effusion    Blood disorder    Recent surgery, stroke, mini-stroke, or spinal puncture    Kidney or liver disease, uncontrolled high blood pressure, diabetes, vasculitis, heart failure, lupus, or other collagen-vascular disease, or high cholesterol    Pregnancy or breastfeeding    Younger than 18 years old    Recent or planned dental procedure  Drug interactions and anticoagulants  Many medicines interfere with the effect of blood thinners. Before starting these medicines, be sure your doctor knows about any prescription, over-the-counter, or herbal supplements you take. In particular, tell your provider about:    Antibiotics    Heart medicines    Cimetidine    Aspirin or other anti-inflammatory drugs such as ibuprofen, naproxen, ketoprofen, or other arthritis medicines    Drugs for depression, cancer HIV (protease inhibitors), diabetes, seizures, gout, high cholesterol, or thyroid replacement    Vitamins containing vitamin K or herbal products such as ginkgo, Co-Q10, garlic, or Tiago's wort     Note: This information topic may not include all directions, precautions, medical conditions, drug/food interactions, and warnings for these medicines. Check with your doctor, nurse, or pharmacist for any questions you have.      5265-0442 [employer]. [address] [place] [address], [place] [address]. All rights reserved. This information is not intended as a substitute for professional medical care. Always follow your  healthcare professional's instructions.            Enoxaparin Sodium Solution for injection  What is this medicine?  ENOXAPARIN (ee nox a PA rin) is used after knee, hip, or abdominal surgeries to prevent blood clotting. It is also used to treat existing blood clots in the lungs or in the veins.  This medicine may be used for other purposes; ask your health care provider or pharmacist if you have questions.  What should I tell my health care provider before I take this medicine?  They need to know if you have any of these conditions:    bleeding disorders, hemorrhage, or hemophilia    infection of the heart or heart valves    kidney or liver disease    previous stroke    prosthetic heart valve    recent surgery or delivery of a baby    ulcer in the stomach or intestine, diverticulitis, or other bowel disease    an unusual or allergic reaction to enoxaparin, heparin, pork or pork products, other medicines, foods, dyes, or preservatives    pregnant or trying to get pregnant    breast-feeding  How should I use this medicine?  This medicine is for injection under the skin. It is usually given by a health-care professional. You or a family member may be trained on how to give the injections. If you are to give yourself injections, make sure you understand how to use the syringe, measure the dose if necessary, and give the injection. To avoid bruising, do not rub the site where this medicine has been injected. Do not take your medicine more often than directed. Do not stop taking except on the advice of your doctor or health care professional.  Make sure you receive a puncture-resistant container to dispose of the needles and syringes once you have finished with them. Do not reuse these items. Return the container to your doctor or health care professional for proper disposal.  Talk to your pediatrician regarding the use of this medicine in children. Special care may be needed.  Overdosage: If you think you have taken too  much of this medicine contact a poison control center or emergency room at once.  NOTE: This medicine is only for you. Do not share this medicine with others.  What if I miss a dose?  If you miss a dose, take it as soon as you can. If it is almost time for your next dose, take only that dose. Do not take double or extra doses.  What may interact with this medicine?    aspirin and aspirin-like medicines    certain medicines that treat or prevent blood clots    dipyridamole    NSAIDs, medicines for pain and inflammation, like ibuprofen or naproxen  This list may not describe all possible interactions. Give your health care provider a list of all the medicines, herbs, non-prescription drugs, or dietary supplements you use. Also tell them if you smoke, drink alcohol, or use illegal drugs. Some items may interact with your medicine.  What should I watch for while using this medicine?  Visit your doctor or health care professional for regular checks on your progress. Your condition will be monitored carefully while you are receiving this medicine.  Notify your doctor or health care professional and seek emergency treatment if you develop breathing problems; changes in vision; chest pain; severe, sudden headache; pain, swelling, warmth in the leg; trouble speaking; sudden numbness or weakness of the face, arm, or leg. These can be signs that your condition has gotten worse.  If you are going to have surgery, tell your doctor or health care professional that you are taking this medicine.  Do not stop taking this medicine without first talking to your doctor. Be sure to refill your prescription before you run out of medicine.  Avoid sports and activities that might cause injury while you are using this medicine. Severe falls or injuries can cause unseen bleeding. Be careful when using sharp tools or knives. Consider using an electric razor. Take special care brushing or flossing your teeth. Report any injuries, bruising, or  red spots on the skin to your doctor or health care professional.  What side effects may I notice from receiving this medicine?  Side effects that you should report to your doctor or health care professional as soon as possible:    allergic reactions like skin rash, itching or hives, swelling of the face, lips, or tongue    feeling faint or lightheaded, falls    signs and symptoms of bleeding such as bloody or black, tarry stools; red or dark-brown urine; spitting up blood or brown material that looks like coffee grounds; red spots on the skin; unusual bruising or bleeding from the eye, gums, or nose  Side effects that usually do not require medical attention (report to your doctor or health care professional if they continue or are bothersome):    pain, redness, or irritation at site where injected  This list may not describe all possible side effects. Call your doctor for medical advice about side effects. You may report side effects to FDA at 3-959-FDA-8016.  Where should I keep my medicine?  Keep out of the reach of children.  Store at room temperature between 15 and 30 degrees C (59 and 86 degrees F). Do not freeze. If your injections have been specially prepared, you may need to store them in the refrigerator. Ask your pharmacist. Throw away any unused medicine after the expiration date.  NOTE:This sheet is a summary. It may not cover all possible information. If you have questions about this medicine, talk to your doctor, pharmacist, or health care provider. Copyright  2016 [employer]              Follow-ups after your visit        Your next 10 appointments already scheduled     Mar 11, 2017  9:45 AM CST   Lab with  LAB    [facility] ([facility])    [address] [address] [phone]              Future tests that were ordered for you today     Open Future Orders        Priority Expected Expires Ordered    Echocardiogram Complete Routine   "3/11/2018 3/11/2017    INR Routine 3/11/2017 3/11/2018 3/11/2017            Who to contact     If you have questions or need follow up information about today's clinic visit or your schedule please contact [facility] directly at [phone].  Normal or non-critical lab and imaging results will be communicated to you by MyChart, letter or phone within 4 business days after the clinic has received the results. If you do not hear from us within 7 days, please contact the clinic through [employer] or phone. If you have a critical or abnormal lab result, we will notify you by phone as soon as possible.  Submit refill requests through [employer] or call your pharmacy and they will forward the refill request to us. Please allow 3 business days for your refill to be completed.          Additional Information About Your Visit        MyChart Information     Dtime lets you send messages to your doctor, view your test results, renew your prescriptions, schedule appointments and more. To sign up, go to [email or web address] . Click on \"Log in\" on the left side of the screen, which will take you to the Welcome page. Then click on \"Sign up Now\" on the right side of the page.     You will be asked to enter the access code listed below, as well as some personal information. Please follow the directions to create your username and password.     Your access code is: [email or web address]  Expires: 2017  6:53 PM     Your access code will  in 90 days. If you need help or a new code, please call your [place] clinic or [phone].        Care EveryWhere ID     This is your Care EveryWhere ID. This could be used by other organizations to access your [place] medical records  [id number]        Your Vitals Were     Pulse Height Pulse Oximetry BMI (Body Mass Index)          64 1.6 m (5' 3\") 99% 21.84 kg/m2         Blood Pressure from Last 3 Encounters:   17 118/74   17 122/66   10/18/16 117/76    Weight from Last 3 " Encounters:   03/11/17 55.9 kg (123 lb 4.8 oz)   03/09/17 56.2 kg (123 lb 14.4 oz)   10/18/16 55.8 kg (123 lb)              We Performed the Following     EKG 12-lead, tracing only (Future)        Primary Care Provider Office Phone # Fax #    Romelia Parker [phone]       [facility] [address]  [facility] [address]        Thank you!     Thank you for choosing [facility]  for your care. Our goal is always to provide you with excellent care. Hearing back from our patients is one way we can continue to improve our services. Please take a few minutes to complete the written survey that you may receive in the mail after your visit with us. Thank you!             Your Updated Medication List - Protect others around you: Learn how to safely use, store and throw away your medicines at www.disposemymeds.org.          This list is accurate as of: 3/11/17  9:38 AM.  Always use your most recent med list.                   Brand Name Dispense Instructions for use    aspirin 81 MG tablet     100 tablet    Take 1 tablet (81 mg) by mouth daily       CALCIUM + D PO      Take  by mouth.       dorzolamide-timolol 2-0.5 % ophthalmic solution    COSOPT     1 drop 2 times daily.       enoxaparin 60 MG/0.6ML injection    LOVENOX    5.6 mL    Inject 0.56 mLs (56 mg) Subcutaneous 2 times daily for 5 days       FLUZONE HIGH-DOSE 0.5 ML injection   Generic drug:  influenza Vac Split High-Dose      ADM 0.5ML IM UTD       garlic 150 MG Tabs tablet      Take 150 mg by mouth daily       metoprolol 25 MG tablet    LOPRESSOR    7 tablet    Take 0.5 tablets (12.5 mg) by mouth 2 times daily for 7 days       MULTIVITAMINS PO      Take  by mouth.       simvastatin 10 MG tablet    ZOCOR    45 tablet    Take 0.5 tablets (5 mg) by mouth At Bedtime       travoprost Z (benzalkonium) 0.004 % ophthalmic solution    TRAVATAN Z     1 drop every evening.       warfarin 5 MG tablet    COUMADIN    7  tablet    Take 1 tablet (5 mg) by mouth daily for 7 days       zinc sulfate 220 MG capsule    ZINCATE     Take 220 mg by mouth 2 times daily

## 2017-03-11 NOTE — LETTER
3/11/2017      RE: Frannie Roa  [address]  [facility] [address]       Dear Colleague,    Thank you for the opportunity to participate in the care of your patient, Frannie Roa, at the [facility] at [facility]. Please see a copy of my visit note below.       SUBJECTIVE:  Frannie Roa is a 81 year old female who presents for ED follow up.  Patient with MVP and MR. Visited ED day before yesterday with palpitation. Was in afib with rate 120 to 150 BPM. Recent bronchiitis. Received one dose of Diltiazem and spontaneously converted to SR. Remain asymptomatic. Started on Metoprolol and coumadin with Lovanox bridging.    Patient Active Problem List    Diagnosis Date Noted     Advanced directives, counseling/discussion 03/31/2016     Priority: Medium     Patient states has Advance Directive and will bring in a copy to clinic.         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  Current Outpatient Prescriptions   Medication Sig     metoprolol (LOPRESSOR) 25 MG tablet Take 0.5 tablets (12.5 mg) by mouth 2 times daily for 7 days     enoxaparin (LOVENOX) 60 MG/0.6ML injection Inject 0.56 mLs (56 mg) Subcutaneous 2 times daily for 5 days     warfarin (COUMADIN) 5 MG tablet Take 1 tablet (5 mg) by mouth daily for 7 days     simvastatin (ZOCOR) 10 MG tablet Take 0.5 tablets (5 mg) by mouth At Bedtime     FLUZONE HIGH-DOSE 0.5 ML injection ADM 0.5ML IM UTD     aspirin 81 MG tablet Take 1 tablet (81 mg) by mouth daily     zinc sulfate (ZINCATE) 220 MG capsule Take 220 mg by mouth 2 times daily     garlic 150 MG TABS  Take 150 mg by mouth daily     Calcium Carbonate-Vitamin D (CALCIUM + D PO) Take  by mouth.     Multiple Vitamin (MULTIVITAMINS PO) Take  by mouth.     dorzolamide-timolol (COSOPT) 2-0.5 % ophthalmic solution 1 drop 2 times daily.     travoprost Z, benzalkonium, (TRAVATAN Z) 0.004 % ophthalmic solution 1 drop every evening.     No current facility-administered medications for this visit.      Past Medical History   Diagnosis Date     Arthritis      osteoarthritis     Glaucoma      Hyperlipidemia 2/1/2015     Myxomatous mitral valve regurgitation 4/22/2014     SVT (supraventricular tachycardia) (H) 8/25/2014     Systolic murmur 4/22/2014     Past Surgical History   Procedure Laterality Date     Ent surgery       T & A     Biopsy       breast     Eye surgery       Laser Trabelectomy for glaucoma     Orthopedic surgery       Right ankle cyst removal requiring ORIF     Phacoemulsification clear cornea w/ standard iol implant, endoscopic cyclophotocoagulation, combined  11/28/2011     Procedure:COMBINED PHACOEMULSIFICATION CLEAR CORNEA WITH STANDARD INTRAOCULAR LENS IMPLANT, ENDOSCOPIC CYCLOPHOTOCOAGULATION; LEFT EYE PHACOEMULSIFICATION CLEAR CORNEA WITH STANDARD INTRAOCULAR LENS IMPLANT, ENDOSCOPIC CYCLOPHOTOCOAGULATION ; Surgeon:ELOY WARE; Location:[facility]     Allergies   Allergen Reactions     Penicillins      Sulfa Drugs      Social History     Social History     Marital status:      Spouse name: N/A     Number of children: N/A     Years of education: N/A     Occupational History     Not on file.     Social History Main Topics     Smoking status: Never Smoker     Smokeless tobacco: Never Used     Alcohol use 0.0 oz/week     0 Standard drinks or equivalent per week      Comment: 3 glasses wine/week     Drug use: No     Sexual activity: Yes     Partners: Male     Other Topics Concern     Parent/Sibling W/ Cabg, Mi Or Angioplasty Before 65f 55m? No     Social History Narrative     Family History   Problem  "Relation Age of Onset     Colon Cancer Mother      Myocardial Infarction Maternal Grandmother      Myocardial Infarction Paternal Grandfather      Other Cancer Sister      Leukemia Daughter           REVIEW OF SYSTEMS:  General: negative, fever, chills, night sweats  Skin: negative, acne, rash and scaling  Eyes: negative, double vision, eye pain and photophobia  Ears/Nose/Throat: negative, nasal congestion and purulent rhinorrhea  Respiratory: No dyspnea on exertion, No cough, No hemoptysis and negative  Cardiovascular: negative, chest pain, exertional chest pain or pressure, paroxysmal nocturnal dyspnea, dyspnea on exertion and orthopnea       OBJECTIVE:  Blood pressure 118/74, pulse 64, height 1.6 m (5' 3\"), weight 55.9 kg (123 lb 4.8 oz), SpO2 99 %, not currently breastfeeding.  General Appearance: healthy, alert, active and no distress  Head: Normocephalic. No masses, lesions, tenderness or abnormalities  Eyes: conjuctiva clear, PERRL, EOM intact  Ears: External ears normal. Canals clear. TM's normal.  Nose: Nares normal  Mouth: normal  Neck: Supple, no cervical adenopathy, no thyromegaly  Lungs: clear to auscultation  Cardiac: regular rate and rhythm, normal S1 and S2, MR.       ASSESSMENT/PLAN:  Patient with MVP/mod MR as per previous echo. Had first bought of afin(120-150 BPM) two days ago. Spontaneous conversion to SR. Remain in SR with no symptoms.  Reviewed echo. Severe LAE. MR may be more than reported. Patient exercise well with no symptoms.  On coumadin and Metoprolol.  Will check INR today.If above 2 will stop Lovanox.  Patient want to see Dr. Seals as he was seeing him before.  Will make an appointment with Dr. Seals in 2 weeks, with an echo before the visit.  Per orders.   Return to Clinic 2 weeks.    Please do not hesitate to contact me if you have any questions/concerns.     Sincerely,     VICKI Felder MD    "

## 2017-03-11 NOTE — NURSING NOTE
Cardiac Testing: Patient given instructions regarding  echocardiogram . Discussed purpose, preparation, procedure and when to expect results reported back to the patient. Patient demonstrated understanding of this information and agreed to call with further questions or concerns.  Labs: Patient was given results of the laboratory testing obtained today. Patient demonstrated understanding of this information and agreed to call with further questions or concerns.   Med Reconcile: Reviewed and verified all current medications with the patient. The updated medication list was printed and given to the patient.  Patient stated she understood all health information given and agreed to call with further questions or concerns.

## 2017-03-11 NOTE — PATIENT INSTRUCTIONS
"You were seen today in the Cardiovascular Clinic at the [facility].     Cardiology Providers you saw during your visit: Dr. Mayo       1.  INR today. If the INR is above 2 - you can stop your lovenox. Follow with Mali Jacobs for Coumadin/INR monitoring.   2.  Echocardiogram next week   2.  Please follow-up with Dr. Altamirano in the next 2 weeks. Michelle his nurse will call you with an appt day and time.       Shiva Hammonds RN  [facility]  Cardiology Care Coordinator    Questions and [phone].   First press #1 for the e[employer] and then press #3 for \"Medical Questions\" to reach us Cardiology Nurses.            Using Blood Thinners (Anticoagulants)  Blood thinners or anticoagulants are medicines that help prevent blood clots from forming. They include warfarin, heparin, dabigatran, rivaroxaban, apixaban,and edoxaban. Your health care provider will help you decide which medicine is best for you.    Taking an anticoagulant safely  When you are taking a blood thinner, you will need to take certain steps to stay safe. Too much blood thinner puts you at risk for bleeding. Too little puts you at risk for stroke. Follow these guidelines. Also follow any others that your health care provider gives you.    You may be told you need regular lab testing while taking these medicines. Warfarin requires routine testing to blood-thinning level testing while the other medicines do not.    Tell your doctor about all medicines you take. This includes over the counter medicines, supplements, or herbal remedies. Do not take any medicines (including ones you buy over the counter) that your doctor doesn t know about. Some medicines can interact with blood thinners and cause serious problems.    Tell any health care provider that you see for care (such as doctors, dentists, chiropractors, home health nurses) that you take a blood thinner.    Carry a medical ID card or wear a medical-alert " bracelet that says you take an anticoagulant.    Before taking aspirin, check with your doctor. Aspirin can significantly increase your risk of bleeding.    This medicine makes bleeding harder to stop. To protect yourself:    Avoid activities that may cause injury. If you fall or are injured, contact your health care provider right away. Blood thinners prevent clotting, so you could be bleeding inside without realizing it.    Use a soft-bristle toothbrush and waxed dental floss. Shave with an electric razor rather than a blade.    Don t go barefoot. Don t trim corns or calluses yourself.  Warfarin: Other important information  Several precautions are especially important when you are taking warfarin. Always keep these points in mind:    Be sure to follow your health care provider's instructions for taking warfarin.    Take this medicine at the same time each day. Take it with a full glass of water, with or without food. If you miss a dose, contact your doctor to find out how much to take. Avoid takinga double dose.    Warfarin is an effective drug, but it can be dangerous if not taken properly. It makes your blood less likely to form clots. If you take too much, it can cause serious internal or external bleeding.    You will need to have regular monitoring while you are taking warfarin. This includes blood tests to check your international normalized ratio (INR) and prothrombin time (PT). These tests show how quickly your blood clots. You will also have a complete blood count (CBC) periodically. This looks at your blood and platelet levels. Both of these need to be followed while you're on warfarin. Talk with your health care provider about whether you need to visit the clinic every week, or if services are available for monitoring in your home.    Certain medicines can affect your INR and PT levels. Tell your health care provider if there are any changes in your medicines. This includes any over-the-counter  medicines, supplements like vitamin K, or herbal remedies.    Your diet can also affect your INR and PT levels. Because of this, it's important to eat a consistent diet. It is especially important to eat a consistent amount of foods that are high in vitamin K. Be sure to talk with your health care provider before making any big changes in your diet.    Remember that warfarin increases your risk of bleeding. Be careful not to injure yourself. If you have a significant injury, contact your health care provider right away. It's important to alert your doctor if you've fallen or hurt yourself, even if you don't break your skin. You could be bleeding inside your body without realizing it.     Warfarin: Watch your INR/PT blood levels  Two tests are used to find out how your blood is clotting. One is protime (PT), the other is the international normalized ratio (INR).    Go for your blood tests (INR/PT) as often as directed. Your diet and the other medicines you take can affect your INR/PT levels.    Your INR was between ___ and ___.    Ask your doctor what your goal INR is. My goal INR is between ___ and ___.    My next INR/PT blood draw is due on _____________ (date) at ___________ (time) by ___________ (name of doctor or clinic).    The name of the doctor who is monitoring my anticoagulation therapy is _____________________ and the phone number is _________________.    Follow up with your doctor or as advised by his or her staff. It usually takes a few hours for your doctor to get the results of your clotting tests. Call to get your lab results to find out if your doctor needs to make further changes to your warfarin dose.    If your blood is drawn for these tests at a location other than your doctor's office, tell your doctor as soon as you get your lab results.   Warfarin: Watch what you eat  Vitamin K helps your blood clot. So you have to watch how much you eat of foods that contain vitamin K. These foods can affect  the way warfarin works. They do not affect the other non-warfarin blood thinners.Here are some specific tips:    Try to keep your diet about the same each day. If you change your diet for any reason, such as for illness or to lose weight, be sure to tell your doctor.    Each day, eat the same amount of foods that are high in vitamin K. These include asparagus, avocado, broccoli, cabbage, kale, spinach, and some other leafy green vegetables. Oils, such as soybean, canola, and olive oils, are also high in vitamin K.    Limit fats to 2 to 4 tablespoons a day.    Ask your health care provider if you should avoid alcohol while you are taking a blood thinner.    Avoid teas that contain sweet clover, sweet betty, or tonka beans. These can affect how your medicine works.    Talk with your health care provider and pharmacist about specific foods or special diets that can affect anticoagulant levels. These include grapefruit juice, cranberries and cranberry juice, fish oil supplements, garlic, deacon, licorice, turmeric, and herbal teas and supplements.  Talk with your health care provider if you have concerns about these or other food products and their effects on warfarin.     When to call your doctor if you re taking an anticoagulant  Call your doctor right away if you have any of these:    Bleeding that doesn t stop in 10 minutes    A heavier-than-normal menstrual period or bleeding between periods    Coughing or throwing up blood    Bloody diarrhea or bleeding hemorrhoids     Dark-colored urine or black stools    Red or black-and-blue marks on the skin that get larger    Dizziness or fatigue    Chest pain or trouble breathing  Allergic reactions:    Rash    Itching    Swelling    Trouble swallowing or breathing   Medical conditions and anticoagulants  Before starting a blood thinner, be sure your doctor knows if you have any of these conditions:    Stomach ulcer now or in the past    Vomited blood or had bloody stools  (black or red color)    Aneurysm, pericarditis, or pericardial effusion    Blood disorder    Recent surgery, stroke, mini-stroke, or spinal puncture    Kidney or liver disease, uncontrolled high blood pressure, diabetes, vasculitis, heart failure, lupus, or other collagen-vascular disease, or high cholesterol    Pregnancy or breastfeeding    Younger than 18 years old    Recent or planned dental procedure  Drug interactions and anticoagulants  Many medicines interfere with the effect of blood thinners. Before starting these medicines, be sure your doctor knows about any prescription, over-the-counter, or herbal supplements you take. In particular, tell your provider about:    Antibiotics    Heart medicines    Cimetidine    Aspirin or other anti-inflammatory drugs such as ibuprofen, naproxen, ketoprofen, or other arthritis medicines    Drugs for depression, cancer HIV (protease inhibitors), diabetes, seizures, gout, high cholesterol, or thyroid replacement    Vitamins containing vitamin K or herbal products such as ginkgo, Co-Q10, garlic, or Tiago's wort     Note: This information topic may not include all directions, precautions, medical conditions, drug/food interactions, and warnings for these medicines. Check with your doctor, nurse, or pharmacist for any questions you have.      0575-9489 [employer]. [address] [place] [address]. All rights reserved. This information is not intended as a substitute for professional medical care. Always follow your healthcare professional's instructions.            Enoxaparin Sodium Solution for injection  What is this medicine?  ENOXAPARIN (ee nox a PA rin) is used after knee, hip, or abdominal surgeries to prevent blood clotting. It is also used to treat existing blood clots in the lungs or in the veins.  This medicine may be used for other purposes; ask your health care provider or pharmacist if you have questions.  What should I tell my health  care provider before I take this medicine?  They need to know if you have any of these conditions:    bleeding disorders, hemorrhage, or hemophilia    infection of the heart or heart valves    kidney or liver disease    previous stroke    prosthetic heart valve    recent surgery or delivery of a baby    ulcer in the stomach or intestine, diverticulitis, or other bowel disease    an unusual or allergic reaction to enoxaparin, heparin, pork or pork products, other medicines, foods, dyes, or preservatives    pregnant or trying to get pregnant    breast-feeding  How should I use this medicine?  This medicine is for injection under the skin. It is usually given by a health-care professional. You or a family member may be trained on how to give the injections. If you are to give yourself injections, make sure you understand how to use the syringe, measure the dose if necessary, and give the injection. To avoid bruising, do not rub the site where this medicine has been injected. Do not take your medicine more often than directed. Do not stop taking except on the advice of your doctor or health care professional.  Make sure you receive a puncture-resistant container to dispose of the needles and syringes once you have finished with them. Do not reuse these items. Return the container to your doctor or health care professional for proper disposal.  Talk to your pediatrician regarding the use of this medicine in children. Special care may be needed.  Overdosage: If you think you have taken too much of this medicine contact a poison control center or emergency room at once.  NOTE: This medicine is only for you. Do not share this medicine with others.  What if I miss a dose?  If you miss a dose, take it as soon as you can. If it is almost time for your next dose, take only that dose. Do not take double or extra doses.  What may interact with this medicine?    aspirin and aspirin-like medicines    certain medicines that treat or  prevent blood clots    dipyridamole    NSAIDs, medicines for pain and inflammation, like ibuprofen or naproxen  This list may not describe all possible interactions. Give your health care provider a list of all the medicines, herbs, non-prescription drugs, or dietary supplements you use. Also tell them if you smoke, drink alcohol, or use illegal drugs. Some items may interact with your medicine.  What should I watch for while using this medicine?  Visit your doctor or health care professional for regular checks on your progress. Your condition will be monitored carefully while you are receiving this medicine.  Notify your doctor or health care professional and seek emergency treatment if you develop breathing problems; changes in vision; chest pain; severe, sudden headache; pain, swelling, warmth in the leg; trouble speaking; sudden numbness or weakness of the face, arm, or leg. These can be signs that your condition has gotten worse.  If you are going to have surgery, tell your doctor or health care professional that you are taking this medicine.  Do not stop taking this medicine without first talking to your doctor. Be sure to refill your prescription before you run out of medicine.  Avoid sports and activities that might cause injury while you are using this medicine. Severe falls or injuries can cause unseen bleeding. Be careful when using sharp tools or knives. Consider using an electric razor. Take special care brushing or flossing your teeth. Report any injuries, bruising, or red spots on the skin to your doctor or health care professional.  What side effects may I notice from receiving this medicine?  Side effects that you should report to your doctor or health care professional as soon as possible:    allergic reactions like skin rash, itching or hives, swelling of the face, lips, or tongue    feeling faint or lightheaded, falls    signs and symptoms of bleeding such as bloody or black, tarry stools; red or  dark-brown urine; spitting up blood or brown material that looks like coffee grounds; red spots on the skin; unusual bruising or bleeding from the eye, gums, or nose  Side effects that usually do not require medical attention (report to your doctor or health care professional if they continue or are bothersome):    pain, redness, or irritation at site where injected  This list may not describe all possible side effects. Call your doctor for medical advice about side effects. You may report side effects to FDA at 5-701-DPB-7026.  Where should I keep my medicine?  Keep out of the reach of children.  Store at room temperature between 15 and 30 degrees C (59 and 86 degrees F). Do not freeze. If your injections have been specially prepared, you may need to store them in the refrigerator. Ask your pharmacist. Throw away any unused medicine after the expiration date.  NOTE:This sheet is a summary. It may not cover all possible information. If you have questions about this medicine, talk to your doctor, pharmacist, or health care provider. Copyright  2016 [employer]

## 2017-03-11 NOTE — NURSING NOTE
Chief Complaint   Patient presents with     Follow Up For     81yr old female with a h/o mitral regurgitation and SVT presenting for evaluation of palpitations and 'racing heart'.  Needs EKG prior.

## 2017-03-11 NOTE — PROGRESS NOTES
SUBJECTIVE:  Frannie Roa is a 81 year old female who presents for ED follow up.  Patient with MVP and MR. Visited ED day before yesterday with palpitation. Was in afib with rate 120 to 150 BPM. Recent bronchiitis. Received one dose of Diltiazem and spontaneously converted to SR. Remain asymptomatic. Started on Metoprolol and coumadin with Lovanox bridging.    Patient Active Problem List    Diagnosis Date Noted     Advanced directives, counseling/discussion 03/31/2016     Priority: Medium     Patient states has Advance Directive and will bring in a copy to clinic.         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  Current Outpatient Prescriptions   Medication Sig     metoprolol (LOPRESSOR) 25 MG tablet Take 0.5 tablets (12.5 mg) by mouth 2 times daily for 7 days     enoxaparin (LOVENOX) 60 MG/0.6ML injection Inject 0.56 mLs (56 mg) Subcutaneous 2 times daily for 5 days     warfarin (COUMADIN) 5 MG tablet Take 1 tablet (5 mg) by mouth daily for 7 days     simvastatin (ZOCOR) 10 MG tablet Take 0.5 tablets (5 mg) by mouth At Bedtime     FLUZONE HIGH-DOSE 0.5 ML injection ADM 0.5ML IM UTD     aspirin 81 MG tablet Take 1 tablet (81 mg) by mouth daily     zinc sulfate (ZINCATE) 220 MG capsule Take 220 mg by mouth 2 times daily     garlic 150 MG TABS Take 150 mg by mouth daily     Calcium Carbonate-Vitamin D (CALCIUM + D PO) Take  by mouth.     Multiple Vitamin (MULTIVITAMINS PO) Take  by mouth.     dorzolamide-timolol (COSOPT) 2-0.5 % ophthalmic solution 1 drop 2 times daily.     travoprost Z, benzalkonium, (TRAVATAN Z) 0.004 % ophthalmic solution 1 drop every evening.     No  current facility-administered medications for this visit.      Past Medical History   Diagnosis Date     Arthritis      osteoarthritis     Glaucoma      Hyperlipidemia 2/1/2015     Myxomatous mitral valve regurgitation 4/22/2014     SVT (supraventricular tachycardia) (H) 8/25/2014     Systolic murmur 4/22/2014     Past Surgical History   Procedure Laterality Date     Ent surgery       T & A     Biopsy       breast     Eye surgery       Laser Trabelectomy for glaucoma     Orthopedic surgery       Right ankle cyst removal requiring ORIF     Phacoemulsification clear cornea w/ standard iol implant, endoscopic cyclophotocoagulation, combined  11/28/2011     Procedure:COMBINED PHACOEMULSIFICATION CLEAR CORNEA WITH STANDARD INTRAOCULAR LENS IMPLANT, ENDOSCOPIC CYCLOPHOTOCOAGULATION; LEFT EYE PHACOEMULSIFICATION CLEAR CORNEA WITH STANDARD INTRAOCULAR LENS IMPLANT, ENDOSCOPIC CYCLOPHOTOCOAGULATION ; Surgeon:ELOY WARE; Location[facility]     Allergies   Allergen Reactions     Penicillins      Sulfa Drugs      Social History     Social History     Marital status:      Spouse name: N/A     Number of children: N/A     Years of education: N/A     Occupational History     Not on file.     Social History Main Topics     Smoking status: Never Smoker     Smokeless tobacco: Never Used     Alcohol use 0.0 oz/week     0 Standard drinks or equivalent per week      Comment: 3 glasses wine/week     Drug use: No     Sexual activity: Yes     Partners: Male     Other Topics Concern     Parent/Sibling W/ Cabg, Mi Or Angioplasty Before 65f 55m? No     Social History Narrative     Family History   Problem Relation Age of Onset     Colon Cancer Mother      Myocardial Infarction Maternal Grandmother      Myocardial Infarction Paternal Grandfather      Other Cancer Sister      Leukemia Daughter           REVIEW OF SYSTEMS:  General: negative, fever, chills, night sweats  Skin: negative, acne, rash and scaling  Eyes: negative, double  "vision, eye pain and photophobia  Ears/Nose/Throat: negative, nasal congestion and purulent rhinorrhea  Respiratory: No dyspnea on exertion, No cough, No hemoptysis and negative  Cardiovascular: negative, chest pain, exertional chest pain or pressure, paroxysmal nocturnal dyspnea, dyspnea on exertion and orthopnea       OBJECTIVE:  Blood pressure 118/74, pulse 64, height 1.6 m (5' 3\"), weight 55.9 kg (123 lb 4.8 oz), SpO2 99 %, not currently breastfeeding.  General Appearance: healthy, alert, active and no distress  Head: Normocephalic. No masses, lesions, tenderness or abnormalities  Eyes: conjuctiva clear, PERRL, EOM intact  Ears: External ears normal. Canals clear. TM's normal.  Nose: Nares normal  Mouth: normal  Neck: Supple, no cervical adenopathy, no thyromegaly  Lungs: clear to auscultation  Cardiac: regular rate and rhythm, normal S1 and S2, MR.       ASSESSMENT/PLAN:  Patient with MVP/mod MR as per previous echo. Had first bought of afin(120-150 BPM) two days ago. Spontaneous conversion to SR. Remain in SR with no symptoms.  Reviewed echo. Severe LAE. MR may be more than reported. Patient exercise well with no symptoms.  On coumadin and Metoprolol.  Will check INR today.If above 2 will stop Lovanox.  Patient want to see Dr. Seals as he was seeing him before.  Will make an appointment with Dr. Seals in 2 weeks, with an echo before the visit.  Per orders.   Return to Clinic 2 weeks.  "

## 2017-03-13 ENCOUNTER — ANTICOAGULATION THERAPY VISIT (OUTPATIENT)
Dept: ANTICOAGULATION | Facility: CLINIC | Age: 82
End: 2017-03-13
Payer: MEDICARE

## 2017-03-13 DIAGNOSIS — Z79.01 LONG-TERM (CURRENT) USE OF ANTICOAGULANTS: ICD-10-CM

## 2017-03-13 PROBLEM — I48.91 ATRIAL FIBRILLATION (H): Status: ACTIVE | Noted: 2017-03-13

## 2017-03-13 LAB — INR POINT OF CARE: 1.2 (ref 0.86–1.14)

## 2017-03-13 PROCEDURE — 85610 PROTHROMBIN TIME: CPT | Mod: QW

## 2017-03-13 PROCEDURE — 36416 COLLJ CAPILLARY BLOOD SPEC: CPT

## 2017-03-13 PROCEDURE — 99207 ZZC NO CHARGE NURSE ONLY: CPT

## 2017-03-13 NOTE — PROGRESS NOTES
ANTICOAGULATION FOLLOW-UP CLINIC VISIT    Patient Name:  Frannie Roa  Date:  3/13/2017  Contact Type:  Face to Face    SUBJECTIVE:     Patient Findings     Positives Initiation of therapy (Started in ED on 3/9. using Lovenox still )           OBJECTIVE    INR Protime   Date Value Ref Range Status   03/13/2017 1.2 (A) 0.86 - 1.14 Final       ASSESSMENT / PLAN  INR assessment SUB    Recheck INR In: 3 DAYS Sees Cardiology in 2 days    INR Location Clinic      Anticoagulation Summary as of 3/13/2017     INR goal 2.0-3.0   Today's INR 1.2!   Maintenance plan 5 mg (5 mg x 1) every day   Full instructions 3/15: 2.5 mg; Otherwise 5 mg every day   Weekly total 35 mg   Plan last modified Aleida Farmer RN (3/13/2017)   Next INR check 3/16/2017   Target end date     Indications   Long-term (current) use of anticoagulants [Z79.01] [Z79.01]  Atrial fibrillation (H) [I48.91] [I48.91]         Anticoagulation Episode Summary     INR check location     Preferred lab     Send INR reminders to [facility]    Comments       Anticoagulation Care Providers     Provider Role Specialty Phone number    Asim Altamirano MD Responsible Cardiology [phone]            See the Encounter Report to view Anticoagulation Flowsheet and Dosing Calendar (Go to Encounters tab in chart review, and find the Anticoagulation Therapy Visit)    Dosage adjustment made based on physician directed care plan.    Aleida Farmer RN

## 2017-03-14 ENCOUNTER — PRE VISIT (OUTPATIENT)
Dept: CARDIOLOGY | Facility: CLINIC | Age: 82
End: 2017-03-14

## 2017-03-14 DIAGNOSIS — I48.0 PAROXYSMAL ATRIAL FIBRILLATION (H): Primary | ICD-10-CM

## 2017-03-14 NOTE — TELEPHONE ENCOUNTER
Pt recently seen in ER for palpitations which was found to be atrial fib with RVR (rate 122).  CT with PE protocol was done as d-dimer was elevated; CT negative.  Appears pt converted back to SR while in ER.  She was seen by Dr. Mayo for urgent appt last Saturday. He recommended she have a f/u appt with echo to review progression of mitral valve regurg.    Need to address whether pt needs to stay on metoprolol (short term course started in ER). If so, she will need new script.  She was started on warfarin and should still be on enoxaparin until the is therapeutic. May need labs at appt with Dr. Altamirano.  I have entered a standing order for the INR's

## 2017-03-15 ENCOUNTER — RADIANT APPOINTMENT (OUTPATIENT)
Dept: CARDIOLOGY | Facility: CLINIC | Age: 82
End: 2017-03-15

## 2017-03-15 ENCOUNTER — OFFICE VISIT (OUTPATIENT)
Dept: CARDIOLOGY | Facility: CLINIC | Age: 82
End: 2017-03-15
Attending: INTERNAL MEDICINE
Payer: MEDICARE

## 2017-03-15 ENCOUNTER — ANTICOAGULATION THERAPY VISIT (OUTPATIENT)
Dept: ANTICOAGULATION | Facility: CLINIC | Age: 82
End: 2017-03-15

## 2017-03-15 VITALS
HEART RATE: 71 BPM | WEIGHT: 122.3 LBS | DIASTOLIC BLOOD PRESSURE: 77 MMHG | HEIGHT: 63 IN | OXYGEN SATURATION: 99 % | SYSTOLIC BLOOD PRESSURE: 113 MMHG | BODY MASS INDEX: 21.67 KG/M2

## 2017-03-15 DIAGNOSIS — I34.0 MYXOMATOUS MITRAL VALVE REGURGITATION: ICD-10-CM

## 2017-03-15 DIAGNOSIS — I50.22 CHRONIC SYSTOLIC CONGESTIVE HEART FAILURE (H): ICD-10-CM

## 2017-03-15 DIAGNOSIS — R00.2 PALPITATIONS: ICD-10-CM

## 2017-03-15 DIAGNOSIS — I48.0 PAROXYSMAL ATRIAL FIBRILLATION (H): ICD-10-CM

## 2017-03-15 DIAGNOSIS — Z79.01 LONG-TERM (CURRENT) USE OF ANTICOAGULANTS: ICD-10-CM

## 2017-03-15 DIAGNOSIS — I34.0 MYXOMATOUS MITRAL VALVE REGURGITATION: Primary | ICD-10-CM

## 2017-03-15 LAB
ANION GAP SERPL CALCULATED.3IONS-SCNC: 8 MMOL/L (ref 3–14)
BUN SERPL-MCNC: 16 MG/DL (ref 7–30)
CALCIUM SERPL-MCNC: 8.7 MG/DL (ref 8.5–10.1)
CHLORIDE SERPL-SCNC: 104 MMOL/L (ref 94–109)
CO2 SERPL-SCNC: 28 MMOL/L (ref 20–32)
CREAT SERPL-MCNC: 0.58 MG/DL (ref 0.52–1.04)
GFR SERPL CREATININE-BSD FRML MDRD: NORMAL ML/MIN/1.7M2
GLUCOSE SERPL-MCNC: 96 MG/DL (ref 70–99)
INR PPP: 1.24 (ref 0.86–1.14)
POTASSIUM SERPL-SCNC: 3.8 MMOL/L (ref 3.4–5.3)
SODIUM SERPL-SCNC: 141 MMOL/L (ref 133–144)

## 2017-03-15 PROCEDURE — 80048 BASIC METABOLIC PNL TOTAL CA: CPT | Performed by: INTERNAL MEDICINE

## 2017-03-15 PROCEDURE — 85610 PROTHROMBIN TIME: CPT | Performed by: INTERNAL MEDICINE

## 2017-03-15 PROCEDURE — 36415 COLL VENOUS BLD VENIPUNCTURE: CPT | Performed by: INTERNAL MEDICINE

## 2017-03-15 PROCEDURE — 99212 OFFICE O/P EST SF 10 MIN: CPT

## 2017-03-15 PROCEDURE — 99214 OFFICE O/P EST MOD 30 MIN: CPT | Mod: ZP | Performed by: INTERNAL MEDICINE

## 2017-03-15 RX ORDER — FUROSEMIDE 20 MG
10 TABLET ORAL EVERY OTHER DAY
Qty: 90 TABLET | Refills: 3 | Status: SHIPPED | OUTPATIENT
Start: 2017-03-15 | End: 2017-03-21

## 2017-03-15 RX ORDER — METOPROLOL TARTRATE 25 MG/1
12.5 TABLET, FILM COATED ORAL 2 TIMES DAILY
Qty: 90 TABLET | Refills: 3 | Status: SHIPPED | OUTPATIENT
Start: 2017-03-15 | End: 2017-06-08 | Stop reason: ALTCHOICE

## 2017-03-15 RX ORDER — WARFARIN SODIUM 5 MG/1
5 TABLET ORAL DAILY
Qty: 90 TABLET | Refills: 3 | Status: SHIPPED | OUTPATIENT
Start: 2017-03-15 | End: 2017-05-16 | Stop reason: DRUGHIGH

## 2017-03-15 ASSESSMENT — PAIN SCALES - GENERAL: PAINLEVEL: MILD PAIN (3)

## 2017-03-15 NOTE — PROGRESS NOTES
ANTICOAGULATION FOLLOW-UP CLINIC VISIT    Patient Name:  Frannie Roa  Date:  3/15/2017  Contact Type:  Telephone    SUBJECTIVE:     Patient Findings     Positives Initiation of therapy    Comments Instructions were to continue with Lovenox 60mg BID until INR therapeutic.           OBJECTIVE    INR   Date Value Ref Range Status   03/15/2017 1.24 (H) 0.86 - 1.14 Final       ASSESSMENT / PLAN  INR assessment SUB    Recheck INR In: 2 DAYS    INR Location Clinic      Anticoagulation Summary as of 3/15/2017     INR goal 2.0-3.0   Today's INR 1.24!   Maintenance plan 5 mg (5 mg x 1) every day   Full instructions 3/15: 10 mg; 3/16: 10 mg; Otherwise 5 mg every day   Weekly total 35 mg   Plan last modified Aleida Farmer RN (3/13/2017)   Next INR check 3/17/2017   Target end date     Indications   Long-term (current) use of anticoagulants [Z79.01] [Z79.01]  Atrial fibrillation (H) [I48.91] [I48.91]         Anticoagulation Episode Summary     INR check location     Preferred lab     Send INR reminders to [facility]    Comments INR goal range +++2.2-2.7+++      Anticoagulation Care Providers     Provider Role Specialty Phone number    Asim Altamirano MD Responsible Cardiology [phone]            See the Encounter Report to view Anticoagulation Flowsheet and Dosing Calendar (Go to Encounters tab in chart review, and find the Anticoagulation Therapy Visit)    Spoke with FrannieMick London RN

## 2017-03-15 NOTE — PATIENT INSTRUCTIONS
Schedule [facility] Echo next week  Start Lasix 10 mg daily  Continue Metoprolol 12.5 mg twice daily  Continue Warfarin 5 mg daily at noon, dose may be adjusted by INR clinic  Labs today  Follow up Dr. Altamirano in 2 weeks      Tammie Jaime RN  [phone], press 1 for University, press 3 for nurse.

## 2017-03-15 NOTE — MR AVS SNAPSHOT
After Visit Summary   3/15/2017    Frannie Roa    MRN: [id number]           Patient Information     Date Of Birth          1935        Visit Information        Provider Department      3/15/2017 9:30 AM Asim Altamirano MD [facility]        Today's Diagnoses     Myxomatous mitral valve regurgitation    -  1    Chronic systolic congestive heart failure (H)        Paroxysmal atrial fibrillation (H)          Care Instructions    Schedule AXEL Echo next week  Start Lasix 10 mg daily  Continue Metoprolol 12.5 mg twice daily  Continue Warfarin 5 mg daily at noon, dose may be adjusted by INR clinic  Labs today  Follow up Dr. Altamirano in 2 weeks      Tammie Jaime RN  [phone], press 1 for University, press 3 for nurse.          Follow-ups after your visit        Follow-up notes from your care team     Return in about 2 weeks (around 3/29/2017) for 10 AM, Lab Work.      Your next 10 appointments already scheduled     Mar 16, 2017  3:00 PM CDT   [facility]ticoagulation Visit with [facility]   [facility]    [address]  [facility] [id number] [phone]            Mar 20, 2017 10:00 AM CDT   Ech Axel with [id number]   [facility],  Echocardiography ([facility] [place])    [address]  [facility] [id number] [phone]           1.  Please bring or wear a comfortable two-piece outfit. 2.  Arrival time: -   [facility]:  arrive 75 minutes prior to examination time. -   [facility]:  arrive 90 minutes prior to examination time. -   [facility]:   arrive 15 minutes prior to examination time. 3.  Plan to have someone here to drive you home after the test. -   Someone should stay with you for 6 hours after your test. 4.  No food or drink: -   6 hours before the test 5.  If you take antacids or water pills (diuretics): Do not take them until after your test. You may  take blood pressure medicine with a few sips of water. 6.  If you have diabetes: -   Morning slots preferred -   If you take insulin, call your diabetes care team. Ask if you should take a   dose the morning of your test. -   If you take diabetes medicine by mouth, don't take it on the morning of your test. Bring it with you to take after the test. (If you have questions, call your diabetes care team.) 7.  Bring a list of any medicines you are taking. 8.  Do not drive for 24 hours after the test. 9.  For any questions that cannot be answered, please contact the ordering physician            Mar 29, 2017  9:30 AM CDT   Lab with  LAB   [facility] ([facility])    [address] [place] [address] [phone]            Mar 29, 2017 10:00 AM CDT   (Arrive by 9:45 AM)   RETURN HEART FAILURE with Asim Altamirano MD   [facility] ([facility])    [address] [place] [address] [phone]              Future tests that were ordered for you today     Open Standing Orders        Priority Remaining Interval Expires Ordered    INR Routine 100/100  3/14/2018 3/14/2017          Open Future Orders        Priority Expected Expires Ordered    Transesophageal Echocardiogram Routine 3/15/2017 3/15/2018 3/15/2017            Who to contact     If you have questions or need follow up information about today's clinic visit or your schedule please contact [facility] directly at [phone].  Normal or non-critical lab and imaging results will be communicated to you by MyChart, letter or phone within 4 business days after the clinic has received the results. If you do not hear from us within 7 days, please contact the clinic through [facility] or phone. If you have a critical or abnormal lab result, we will notify you by phone as soon as possible.  Submit refill requests through [employer] or call your pharmacy  "and they will forward the refill request to us. Please allow 3 business days for your refill to be completed.          Additional Information About Your Visit        [employer] Information     [employer] lets you send messages to your doctor, view your test results, renew your prescriptions, schedule appointments and more. To sign up, go to [email or web address] . Click on \"Log in\" on the left side of the screen, which will take you to the Welcome page. Then click on \"Sign up Now\" on the right side of the page.     You will be asked to enter the access code listed below, as well as some personal information. Please follow the directions to create your username and password.     Your access code is: [email or web address]  Expires: 2017  7:53 PM     Your access code will  in 90 days. If you need help or a new code, please call your [place] clinic or [phone].        Care EveryWhere ID     This is your Care EveryWhere ID. This could be used by other organizations to access your [place] medical records  [id number]        Your Vitals Were     Pulse Height Pulse Oximetry BMI (Body Mass Index)          71 1.6 m (5' 3\") 99% 21.66 kg/m2         Blood Pressure from Last 3 Encounters:   03/15/17 113/77   17 118/74   17 122/66    Weight from Last 3 Encounters:   03/15/17 55.5 kg (122 lb 4.8 oz)   17 55.9 kg (123 lb 4.8 oz)   17 56.2 kg (123 lb 14.4 oz)              We Performed the Following     Basic metabolic panel          Today's Medication Changes          These changes are accurate as of: 3/15/17 10:53 AM.  If you have any questions, ask your nurse or doctor.               Start taking these medicines.        Dose/Directions    furosemide 20 MG tablet   Commonly known as:  LASIX   Used for:  Myxomatous mitral valve regurgitation, Chronic systolic congestive heart failure (H), Paroxysmal atrial fibrillation (H)   Started by:  Asim Altamirano MD        Dose:  10 mg   Take 0.5 tablets (10 " mg) by mouth every other day   Quantity:  90 tablet   Refills:  3            Where to get your medicines      These medications were sent to [id number] - [place], [place] - [address] AT [facility] & [address]  [address] [place] [address] [place] [address]     Phone:  [phone]     furosemide 20 MG tablet    metoprolol 25 MG tablet    warfarin 5 MG tablet                Primary Care Provider Office Phone # Fax #    Romelia Parker [phone]       [facility] [address]  [facility] [address]        Thank you!     Thank you for choosing [facility]  for your care. Our goal is always to provide you with excellent care. Hearing back from our patients is one way we can continue to improve our services. Please take a few minutes to complete the written survey that you may receive in the mail after your visit with us. Thank you!             Your Updated Medication List - Protect others around you: Learn how to safely use, store and throw away your medicines at www.disposemymeds.org.          This list is accurate as of: 3/15/17 10:53 AM.  Always use your most recent med list.                   Brand Name Dispense Instructions for use    aspirin 81 MG tablet     100 tablet    Take 1 tablet (81 mg) by mouth daily       CALCIUM + D PO      Take  by mouth.       dorzolamide-timolol 2-0.5 % ophthalmic solution    COSOPT     1 drop 2 times daily.       FLUZONE HIGH-DOSE 0.5 ML injection   Generic drug:  influenza Vac Split High-Dose      ADM 0.5ML IM UTD       furosemide 20 MG tablet    LASIX    90 tablet    Take 0.5 tablets (10 mg) by mouth every other day       garlic 150 MG Tabs tablet      Take 150 mg by mouth daily       metoprolol 25 MG tablet    LOPRESSOR    90 tablet    Take 0.5 tablets (12.5 mg) by mouth 2 times daily       MULTIVITAMINS PO      Take  by mouth.       simvastatin 10 MG tablet    ZOCOR    45 tablet    Take 0.5 tablets (5 mg) by mouth At  Bedtime       travoprost Z (benzalkonium) 0.004 % ophthalmic solution    TRAVATAN Z     1 drop every evening.       warfarin 5 MG tablet    COUMADIN    90 tablet    Take 1 tablet (5 mg) by mouth daily       zinc sulfate 220 MG capsule    ZINCATE     Take 220 mg by mouth 2 times daily

## 2017-03-15 NOTE — PROGRESS NOTES
Asim Altamirano M.D.  Cardiovascular Medicine    I personally saw and examined this patient, discussed care with housestaff and other consultants, reviewed current laboratories and imaging studies, and conveyed impression and diagnostic/therapeutic plan to patient.    Problem List    History  1. New atrial fibrillation  2. Mitral valve prolapse with regurgitation    REVIEW of SYMPTOMS    Constitutional: without fever, chills, night sweats.  Weight is stable  HEENT: without dry eyes, dry mouth, sinusitis, corryza, visual changes  Endocrine: without polyuria, polydypsia, polyphagia, heat or cold intolerance, changing mental status  Cardiology: without chest pain, tightness, heaviness, pressure, paroxysmal dyspnea, orthopnea, palpitation, pre-syncope or syncope or device discharge (if present)  Pulmonary: without asthma, wheezing, cough, hemoptysis  GI: without nausea, emesis, jaundice, pain, hematemesis, melena  : without frequency, urgency, hematuria, stones, pain, abnormal bleeding, frequency, urgency  Neurologic: without TIA, CVA, trauma, seizure  Dermatologic: without lesions, abrasion rash,   Orthopedic/Rheum: without significant joint pain, impairment, limb, polyserositis, ulceration, Raynauds  Heme: without mass, bruising, frequent infection, anemia  Psychiatric: without substance abuse, hallucination, medication, depression    Exercise tolerance:  Allergies:  Penicillins  Sulfa Drugs      Medications:    Zocor  Aspirin  Zincate  Cosopt  Travatan     Past Medical History:    Arthritis  Glaucoma  Hyperlipidemia  Myxomatous mitral valve regurgitation  SVT  Systolic murmur      Past Surgical History:   T&A  Breat biopsy  Eye surgery  Orthopedic surgery - right ankle cyst  Phacoemulsification clear cornea w/ standard iol implant, endoscopic cyclophotocoagulation, combined     Family History:   Colon cancer  MI  Leukemia      Social History:  The patient was accompanied to the ED by her .  Smoking Status:  "Never  Smokeless Tobacco: Never  Alcohol Use: 3 glasses of wine/week  Marital Status:        Objective  /77 (BP Location: Right arm, Patient Position: Chair, Cuff Size: Adult Regular)  Pulse 71  Ht 1.6 m (5' 3\")  Wt 55.5 kg (122 lb 4.8 oz)  SpO2 99%  BMI 21.66 kg/m2  Constitutional: alert, oriented, normal gait and station, normal mentation.  Oral: moist mucous membrans  Lymph: without pathologic adenopathy  Chest: clear to ausculation and percussion  Cor: No evidence of left or right ventricular activity.  Rhythm is regular.  S1 normal, S2 split physiologically. Murmurs are not present  Abdomen: without tenderness, rebound, guarding, masses, ascites  Extremities: Edema not present  Neuro: no focal defects, normal mentation  Skin: without open lesions  Psych: oriented, verbal, mental status in tact  Wt Readings from Last 5 Encounters:   03/15/17 55.5 kg (122 lb 4.8 oz)   03/11/17 55.9 kg (123 lb 4.8 oz)   03/09/17 56.2 kg (123 lb 14.4 oz)   10/18/16 55.8 kg (123 lb)   07/26/16 54.4 kg (120 lb)       Meds  Current Outpatient Prescriptions   Medication     metoprolol (LOPRESSOR) 25 MG tablet     warfarin (COUMADIN) 5 MG tablet     simvastatin (ZOCOR) 10 MG tablet     FLUZONE HIGH-DOSE 0.5 ML injection     aspirin 81 MG tablet     zinc sulfate (ZINCATE) 220 MG capsule     garlic 150 MG TABS     Calcium Carbonate-Vitamin D (CALCIUM + D PO)     Multiple Vitamin (MULTIVITAMINS PO)     dorzolamide-timolol (COSOPT) 2-0.5 % ophthalmic solution     travoprost Z, benzalkonium, (TRAVATAN Z) 0.004 % ophthalmic solution     No current facility-administered medications for this visit.          Labs  Results for NATALIE WILL G (MRN [id number]) as of 3/15/2017 09:48   Ref. Range 6/25/2016 10:17 10/18/2016 10:02 3/9/2017 16:22 3/11/2017 09:49 3/13/2017 00:00   Sodium Latest Ref Range: 133 - 144 mmol/L  140 141     Potassium Latest Ref Range: 3.4 - 5.3 mmol/L  4.3 3.9     Chloride Latest Ref Range: 94 - 109 mmol/L  " 105 105     Carbon Dioxide Latest Ref Range: 20 - 32 mmol/L  28 27     Urea Nitrogen Latest Ref Range: 7 - 30 mg/dL  14 18     Creatinine Latest Ref Range: 0.52 - 1.04 mg/dL  0.66 0.53     GFR Estimate Latest Ref Range: >60 mL/min/1.7m2  86 >90...     GFR Estimate If Black Latest Ref Range: >60 mL/min/1.7m2  >90... >90...     Calcium Latest Ref Range: 8.5 - 10.1 mg/dL  8.6 8.4 (L)     Anion Gap Latest Ref Range: 3 - 14 mmol/L  7 9     Albumin Latest Ref Range: 3.4 - 5.0 g/dL  3.6      Protein Total Latest Ref Range: 6.8 - 8.8 g/dL  7.0      Bilirubin Total Latest Ref Range: 0.2 - 1.3 mg/dL  0.5      Alkaline Phosphatase Latest Ref Range: 40 - 150 U/L  72      ALT Latest Ref Range: 0 - 50 U/L  33      AST Latest Ref Range: 0 - 45 U/L  24      Cholesterol Latest Ref Range: <200 mg/dL  209 (H)      HDL Cholesterol Latest Ref Range: >49 mg/dL  81      LDL Cholesterol Calculated Latest Ref Range: <100 mg/dL  115 (H)      Non HDL Cholesterol Latest Ref Range: <130 mg/dL  128      N-Terminal Pro Bnp Latest Ref Range: 0 - 450 pg/mL  690 (H)      Triglycerides Latest Ref Range: <150 mg/dL  64      Troponin I ES Latest Ref Range: 0.000 - 0.045 ug/L   <0.015...     TSH Latest Ref Range: 0.40 - 4.00 mU/L   2.00     Glucose Latest Ref Range: 70 - 99 mg/dL  95 161 (H)     WBC Latest Ref Range: 4.0 - 11.0 10e9/L   9.0     Hemoglobin Latest Ref Range: 11.7 - 15.7 g/dL 12.2  12.5     Hematocrit Latest Ref Range: 35.0 - 47.0 %   39.1     Platelet Count Latest Ref Range: 150 - 450 10e9/L   392     RBC Count Latest Ref Range: 3.8 - 5.2 10e12/L   4.30     MCV Latest Ref Range: 78 - 100 fl   91     MCH Latest Ref Range: 26.5 - 33.0 pg   29.1     MCHC Latest Ref Range: 31.5 - 36.5 g/dL   32.0     RDW Latest Ref Range: 10.0 - 15.0 %   12.6     Diff Method Unknown   Automated Method     % Neutrophils Latest Units: %   58.2     % Lymphocytes Latest Units: %   30.4     % Monocytes Latest Units: %   8.3     % Eosinophils Latest Units: %   1.2      % Basophils Latest Units: %   0.9     % Immature Granulocytes Latest Units: %   1.0     Nucleated RBCs Latest Ref Range: 0 /100   0     Absolute Neutrophil Latest Ref Range: 1.6 - 8.3 10e9/L   5.3     Absolute Lymphocytes Latest Ref Range: 0.8 - 5.3 10e9/L   2.7     Absolute Monocytes Latest Ref Range: 0.0 - 1.3 10e9/L   0.8     Absolute Eosinophils Latest Ref Range: 0.0 - 0.7 10e9/L   0.1     Absolute Basophils Latest Ref Range: 0.0 - 0.2 10e9/L   0.1     Abs Immature Granulocytes Latest Ref Range: 0 - 0.4 10e9/L   0.1     Absolute Nucleated RBC Unknown   0.0     INR Latest Ref Range: 0.86 - 1.14     0.96 1.2 (A)   D-Dimer Latest Ref Range: 0.0 - 0.50 ug/ml FEU   0.9 (H)         Imaging   Name: WILL ESTRADA  MRN: [id number]  : 1935  Study Date: 10/17/2016 10:54 AM  Age: 81 yrs  Gender: Female  Patient Location: [facility] [place]  Reason For Study: Nonrheumatic mitral (valve) insufficiency  Ordering Physician: FARIHA WADSWORTH  Referring Physician: FARIHA WADSWORTH  Performed By: [facility] Elena Arce     BSA: 1.5 m2  Height: 62 in  Weight: 120 lb  BP: 102/66 mmHg  ______________________________________________________________________________        Procedure  Echocardiogram with two-dimensional, color and spectral Doppler performed.  ______________________________________________________________________________     Interpretation Summary  Bileaflet mitral valve prolapse with moderate mitral regurgitation. No  significant change from prior study.  Right ventricular systolic pressure is 30mmHg above the right atrial  pressure.  ______________________________________________________________________________           Left Ventricle  Global and regional left ventricular function is normal with an EF of 60-65%.  Left ventricular wall thickness is normal. Left ventricular size is normal.  Left ventricular diastolic function is indeterminate. LV filling consistent  with elevated LA pressure. No regional wall  motion abnormalities are seen.     Right Ventricle  Right ventricular function, chamber size, wall motion, and thickness are  normal.  Atria  The right atria appears normal. Severe left atrial enlargement is present.     Mitral Valve  Moderate mitral annular calcification is present. Bileaflet mitral valve  prolapse with moderate mitral regurgitation. No significant change from prior  study.     Aortic Valve  Mild aortic valve sclerosis is present.     Tricuspid Valve  The tricuspid valve is normal. Mild tricuspid insufficiency is present. Right  ventricular systolic pressure is 30mmHg above the right atrial pressure.     Pulmonic Valve  The pulmonic valve is normal. Trace pulmonic insufficiency is present.     Vessels  The aorta root is normal. The pulmonary artery is normal. The inferior vena  cava is normal.  Pericardium  No pericardial effusion is present.     ______________________________________________________________________________  MMode/2D Measurements & Calculations  IVSd: 0.94 cm  LVIDd: 4.8 cm  LVIDs: 3.1 cm  LVPWd: 0.99 cm  FS: 34.4 %  EDV(Teich): 106.8 ml  ESV(Teich): 39.2 ml  LV mass(C)d: 161.6 grams  Ao root diam: 2.4 cm  LA dimension: 5.0 cm  asc Aorta Diam: 2.7 cm  LA/Ao: 2.1  LVOT diam: 2.1 cm  LVOT area: 3.5 cm2  LA Volume (BP): 107.0 ml     LA Volume Index (BP): 69.5 ml/m2           Doppler Measurements & Calculations  MV E max justyn: 89.1 cm/sec  MV A max justyn: 61.7 cm/sec  MV E/A: 1.4  MV max P.3 mmHg  MV mean P.7 mmHg  MV V2 VTI: 39.2 cm  MV dec time: 0.21 sec  PA V2 max: 66.9 cm/sec  PA max P.8 mmHg  PA acc time: 0.11 sec  PI end-d justyn: 99.0 cm/sec  TR max justyn: 263.5 cm/sec  TR max P.8 mmHg           Assessment/Plan

## 2017-03-15 NOTE — LETTER
3/15/2017      RE: Frannie Roa  [address]  [facility] [address]       Dear Colleague,    Thank you for the opportunity to participate in the care of your patient, Frannie Roa, at the [facility]. Please see a copy of my visit note below.    Asim Altamirano M.D.  Cardiovascular Medicine    I personally saw and examined this patient, discussed care with housestaff and other consultants, reviewed current laboratories and imaging studies, and conveyed impression and diagnostic/therapeutic plan to patient.    Problem List    History  1. New atrial fibrillation  2. Mitral valve prolapse with regurgitation    REVIEW of SYMPTOMS    Constitutional: without fever, chills, night sweats.  Weight is stable  HEENT: without dry eyes, dry mouth, sinusitis, corryza, visual changes  Endocrine: without polyuria, polydypsia, polyphagia, heat or cold intolerance, changing mental status  Cardiology: without chest pain, tightness, heaviness, pressure, paroxysmal dyspnea, orthopnea, palpitation, pre-syncope or syncope or device discharge (if present)  Pulmonary: without asthma, wheezing, cough, hemoptysis  GI: without nausea, emesis, jaundice, pain, hematemesis, melena  : without frequency, urgency, hematuria, stones, pain, abnormal bleeding, frequency, urgency  Neurologic: without TIA, CVA, trauma, seizure  Dermatologic: without lesions, abrasion rash,   Orthopedic/Rheum: without significant joint pain, impairment, limb, polyserositis, ulceration, Raynauds  Heme: without mass, bruising, frequent infection, anemia  Psychiatric: without substance abuse, hallucination, medication, depression    Exercise tolerance:  Allergies:  Penicillins  Sulfa Drugs      Medications:    Zocor  Aspirin  Zincate  Cosopt  Travatan     Past Medical History:    Arthritis  Glaucoma  Hyperlipidemia  Myxomatous mitral valve regurgitation  SVT  Systolic murmur      Past  "Surgical History:   T&A  Breat biopsy  Eye surgery  Orthopedic surgery - right ankle cyst  Phacoemulsification clear cornea w/ standard iol implant, endoscopic cyclophotocoagulation, combined     Family History:   Colon cancer  MI  Leukemia      Social History:  The patient was accompanied to the ED by her .  Smoking Status: Never  Smokeless Tobacco: Never  Alcohol Use: 3 glasses of wine/week  Marital Status:        Objective  /77 (BP Location: Right arm, Patient Position: Chair, Cuff Size: Adult Regular)  Pulse 71  Ht 1.6 m (5' 3\")  Wt 55.5 kg (122 lb 4.8 oz)  SpO2 99%  BMI 21.66 kg/m2  Constitutional: alert, oriented, normal gait and station, normal mentation.  Oral: moist mucous membrans  Lymph: without pathologic adenopathy  Chest: clear to ausculation and percussion  Cor: No evidence of left or right ventricular activity.  Rhythm is regular.  S1 normal, S2 split physiologically. Murmurs are not present  Abdomen: without tenderness, rebound, guarding, masses, ascites  Extremities: Edema not present  Neuro: no focal defects, normal mentation  Skin: without open lesions  Psych: oriented, verbal, mental status in tact  Wt Readings from Last 5 Encounters:   03/15/17 55.5 kg (122 lb 4.8 oz)   03/11/17 55.9 kg (123 lb 4.8 oz)   03/09/17 56.2 kg (123 lb 14.4 oz)   10/18/16 55.8 kg (123 lb)   07/26/16 54.4 kg (120 lb)       Meds  Current Outpatient Prescriptions   Medication     metoprolol (LOPRESSOR) 25 MG tablet     warfarin (COUMADIN) 5 MG tablet     simvastatin (ZOCOR) 10 MG tablet     FLUZONE HIGH-DOSE 0.5 ML injection     aspirin 81 MG tablet     zinc sulfate (ZINCATE) 220 MG capsule     garlic 150 MG TABS     Calcium Carbonate-Vitamin D (CALCIUM + D PO)     Multiple Vitamin (MULTIVITAMINS PO)     dorzolamide-timolol (COSOPT) 2-0.5 % ophthalmic solution     travoprost Z, benzalkonium, (TRAVATAN Z) 0.004 % ophthalmic solution     No current facility-administered medications for this visit.  "         Labs  Results for WILL ESTRADA (MRN [id number]) as of 3/15/2017 09:48   Ref. Range 6/25/2016 10:17 10/18/2016 10:02 3/9/2017 16:22 3/11/2017 09:49 3/13/2017 00:00   Sodium Latest Ref Range: 133 - 144 mmol/L  140 141     Potassium Latest Ref Range: 3.4 - 5.3 mmol/L  4.3 3.9     Chloride Latest Ref Range: 94 - 109 mmol/L  105 105     Carbon Dioxide Latest Ref Range: 20 - 32 mmol/L  28 27     Urea Nitrogen Latest Ref Range: 7 - 30 mg/dL  14 18     Creatinine Latest Ref Range: 0.52 - 1.04 mg/dL  0.66 0.53     GFR Estimate Latest Ref Range: >60 mL/min/1.7m2  86 >90...     GFR Estimate If Black Latest Ref Range: >60 mL/min/1.7m2  >90... >90...     Calcium Latest Ref Range: 8.5 - 10.1 mg/dL  8.6 8.4 (L)     Anion Gap Latest Ref Range: 3 - 14 mmol/L  7 9     Albumin Latest Ref Range: 3.4 - 5.0 g/dL  3.6      Protein Total Latest Ref Range: 6.8 - 8.8 g/dL  7.0      Bilirubin Total Latest Ref Range: 0.2 - 1.3 mg/dL  0.5      Alkaline Phosphatase Latest Ref Range: 40 - 150 U/L  72      ALT Latest Ref Range: 0 - 50 U/L  33      AST Latest Ref Range: 0 - 45 U/L  24      Cholesterol Latest Ref Range: <200 mg/dL  209 (H)      HDL Cholesterol Latest Ref Range: >49 mg/dL  81      LDL Cholesterol Calculated Latest Ref Range: <100 mg/dL  115 (H)      Non HDL Cholesterol Latest Ref Range: <130 mg/dL  128      N-Terminal Pro Bnp Latest Ref Range: 0 - 450 pg/mL  690 (H)      Triglycerides Latest Ref Range: <150 mg/dL  64      Troponin I ES Latest Ref Range: 0.000 - 0.045 ug/L   <0.015...     TSH Latest Ref Range: 0.40 - 4.00 mU/L   2.00     Glucose Latest Ref Range: 70 - 99 mg/dL  95 161 (H)     WBC Latest Ref Range: 4.0 - 11.0 10e9/L   9.0     Hemoglobin Latest Ref Range: 11.7 - 15.7 g/dL 12.2  12.5     Hematocrit Latest Ref Range: 35.0 - 47.0 %   39.1     Platelet Count Latest Ref Range: 150 - 450 10e9/L   392     RBC Count Latest Ref Range: 3.8 - 5.2 10e12/L   4.30     MCV Latest Ref Range: 78 - 100 fl   91     MCH  Latest Ref Range: 26.5 - 33.0 pg   29.1     MCHC Latest Ref Range: 31.5 - 36.5 g/dL   32.0     RDW Latest Ref Range: 10.0 - 15.0 %   12.6     Diff Method Unknown   Automated Method     % Neutrophils Latest Units: %   58.2     % Lymphocytes Latest Units: %   30.4     % Monocytes Latest Units: %   8.3     % Eosinophils Latest Units: %   1.2     % Basophils Latest Units: %   0.9     % Immature Granulocytes Latest Units: %   1.0     Nucleated RBCs Latest Ref Range: 0 /100   0     Absolute Neutrophil Latest Ref Range: 1.6 - 8.3 10e9/L   5.3     Absolute Lymphocytes Latest Ref Range: 0.8 - 5.3 10e9/L   2.7     Absolute Monocytes Latest Ref Range: 0.0 - 1.3 10e9/L   0.8     Absolute Eosinophils Latest Ref Range: 0.0 - 0.7 10e9/L   0.1     Absolute Basophils Latest Ref Range: 0.0 - 0.2 10e9/L   0.1     Abs Immature Granulocytes Latest Ref Range: 0 - 0.4 10e9/L   0.1     Absolute Nucleated RBC Unknown   0.0     INR Latest Ref Range: 0.86 - 1.14     0.96 1.2 (A)   D-Dimer Latest Ref Range: 0.0 - 0.50 ug/ml FEU   0.9 (H)         Imaging   Name: WILL ESTRADA  MRN: [id number]  : 1935  Study Date: 10/17/2016 10:54 AM  Age: 81 yrs  Gender: Female  [place] [facility] [place]  Reason For Study: Nonrheumatic mitral (valve) insufficiency  Ordering Physician: FARIHA WADSWORTH  Referring Physician: FARIHA WADSWORTH  Performed By: [facility] Elena Arce     BSA: 1.5 m2  Height: 62 in  Weight: 120 lb  BP: 102/66 mmHg  ______________________________________________________________________________        Procedure  Echocardiogram with two-dimensional, color and spectral Doppler performed.  ______________________________________________________________________________     Interpretation Summary  Bileaflet mitral valve prolapse with moderate mitral regurgitation. No  significant change from prior study.  Right ventricular systolic pressure is 30mmHg above the right  atrial  pressure.  ______________________________________________________________________________           Left Ventricle  Global and regional left ventricular function is normal with an EF of 60-65%.  Left ventricular wall thickness is normal. Left ventricular size is normal.  Left ventricular diastolic function is indeterminate. LV filling consistent  with elevated LA pressure. No regional wall motion abnormalities are seen.     Right Ventricle  Right ventricular function, chamber size, wall motion, and thickness are  normal.  Atria  The right atria appears normal. Severe left atrial enlargement is present.     Mitral Valve  Moderate mitral annular calcification is present. Bileaflet mitral valve  prolapse with moderate mitral regurgitation. No significant change from prior  study.     Aortic Valve  Mild aortic valve sclerosis is present.     Tricuspid Valve  The tricuspid valve is normal. Mild tricuspid insufficiency is present. Right  ventricular systolic pressure is 30mmHg above the right atrial pressure.     Pulmonic Valve  The pulmonic valve is normal. Trace pulmonic insufficiency is present.     Vessels  The aorta root is normal. The pulmonary artery is normal. The inferior vena  cava is normal.  Pericardium  No pericardial effusion is present.     ______________________________________________________________________________  MMode/2D Measurements & Calculations  IVSd: 0.94 cm  LVIDd: 4.8 cm  LVIDs: 3.1 cm  LVPWd: 0.99 cm  FS: 34.4 %  EDV(Teich): 106.8 ml  ESV(Teich): 39.2 ml  LV mass(C)d: 161.6 grams  Ao root diam: 2.4 cm  LA dimension: 5.0 cm  asc Aorta Diam: 2.7 cm  LA/Ao: 2.1  LVOT diam: 2.1 cm  LVOT area: 3.5 cm2  LA Volume (BP): 107.0 ml     LA Volume Index (BP): 69.5 ml/m2           Doppler Measurements & Calculations  MV E max justyn: 89.1 cm/sec  MV A max justyn: 61.7 cm/sec  MV E/A: 1.4  MV max P.3 mmHg  MV mean P.7 mmHg  MV V2 VTI: 39.2 cm  MV dec time: 0.21 sec  PA V2 max: 66.9 cm/sec  PA max  P.8 mmHg  PA acc time: 0.11 sec  PI end-d justyn: 99.0 cm/sec  TR max justyn: 263.5 cm/sec  TR max P.8 mmHg           Assessment/Plan       Asim Altamirano MD

## 2017-03-15 NOTE — MR AVS SNAPSHOT
Frannie JC Janina   3/15/2017   Anticoagulation Therapy Visit    Description:  81 year old female   Provider:  Priscilla London, RN   Department:  [facility]           INR as of 3/15/2017     Today's INR 1.24!      Anticoagulation Summary as of 3/15/2017     INR goal 2.0-3.0   Today's INR 1.24!   Full instructions 3/15: 10 mg; 3/16: 10 mg; Otherwise 5 mg every day   Next INR check 3/17/2017    Indications   Long-term (current) use of anticoagulants [Z79.01] [Z79.01]  Atrial fibrillation (H) [I48.91] [I48.91]         Your next Anticoagulation Clinic appointment(s)     Mar 16, 2017  3:00 PM CDT   [facility] with [facility]   [facility]    [address] [facility] [address]  [facility] [address]   [phone]              March 2017 Details    Sun Mon Tue Wed Thu Fri Sat        1               2               3               4                 5               6               7               8               9               10               11                 12               13               14               15      10 mg   See details      16      10 mg         17            18                 19               20               21               22               23               24               25                 26               27               28               29               30               31                 Date Details   03/15 This INR check       Date of next INR:  3/17/2017         How to take your warfarin dose     To take:  5 mg Take 1 of the 5 mg tablets.    To take:  10 mg Take 2 of the 5 mg tablets.

## 2017-03-15 NOTE — MR AVS SNAPSHOT
Frannie JC Janina   3/15/2017   Anticoagulation Therapy Visit    Description:  81 year old female   Provider:  Priscilla London, RN   Department:  [facility]           INR as of 3/15/2017     Today's INR       Anticoagulation Summary as of 3/15/2017     INR goal 2.0-3.0   Today's INR    Next INR check     Indications   Long-term (current) use of anticoagulants [Z79.01] [Z79.01]  Atrial fibrillation (H) [I48.91] [I48.91]         Your next Anticoagulation Clinic appointment(s)     Mar 16, 2017  3:00 PM CDT   [facility]   [facility]    [address] [facility] [address]  [facility] [address]   [phone]              March 2017 Details    Sun Mon Tue Wed Thu Fri Sat        1               2               3               4                 5               6               7               8               9               10               11                 12               13      5 mg   See details      14      5 mg         15      2.5 mg         16            17               18                 19               20               21               22               23               24               25                 26               27               28               29               30               31                 Date Details   03/13 This INR check       Date of next INR:  3/16/2017         How to take your warfarin dose     To take:  2.5 mg Take 0.5 of a 5 mg tablet.    To take:  5 mg Take 1 of the 5 mg tablets.

## 2017-03-15 NOTE — PROGRESS NOTES
Call from Sohpie Jaime RN working with Dr. Altamirano today.  She reported that Dr. Altamirano prefers [facility] follow.

## 2017-03-17 ENCOUNTER — ANTICOAGULATION THERAPY VISIT (OUTPATIENT)
Dept: ANTICOAGULATION | Facility: CLINIC | Age: 82
End: 2017-03-17

## 2017-03-17 ENCOUNTER — CARE COORDINATION (OUTPATIENT)
Dept: CARDIOLOGY | Facility: CLINIC | Age: 82
End: 2017-03-17

## 2017-03-17 ENCOUNTER — TELEPHONE (OUTPATIENT)
Dept: CARDIOLOGY | Facility: CLINIC | Age: 82
End: 2017-03-17

## 2017-03-17 DIAGNOSIS — Z53.9 ERRONEOUS ENCOUNTER--DISREGARD: Primary | ICD-10-CM

## 2017-03-17 DIAGNOSIS — E78.5 HYPERLIPIDEMIA LDL GOAL <100: ICD-10-CM

## 2017-03-17 DIAGNOSIS — I48.0 PAROXYSMAL ATRIAL FIBRILLATION (H): ICD-10-CM

## 2017-03-17 DIAGNOSIS — I34.0 NON-RHEUMATIC MITRAL REGURGITATION: ICD-10-CM

## 2017-03-17 DIAGNOSIS — I50.22 CHRONIC SYSTOLIC CONGESTIVE HEART FAILURE (H): ICD-10-CM

## 2017-03-17 DIAGNOSIS — Z79.01 LONG-TERM (CURRENT) USE OF ANTICOAGULANTS: ICD-10-CM

## 2017-03-17 LAB
INR PPP: 1.68 (ref 0.86–1.14)
NT-PROBNP SERPL-MCNC: 704 PG/ML (ref 0–450)

## 2017-03-17 PROCEDURE — 80048 BASIC METABOLIC PNL TOTAL CA: CPT | Performed by: INTERNAL MEDICINE

## 2017-03-17 PROCEDURE — 36415 COLL VENOUS BLD VENIPUNCTURE: CPT | Performed by: INTERNAL MEDICINE

## 2017-03-17 PROCEDURE — 80061 LIPID PANEL: CPT | Performed by: INTERNAL MEDICINE

## 2017-03-17 PROCEDURE — 83880 ASSAY OF NATRIURETIC PEPTIDE: CPT | Performed by: INTERNAL MEDICINE

## 2017-03-17 PROCEDURE — 85610 PROTHROMBIN TIME: CPT | Performed by: INTERNAL MEDICINE

## 2017-03-17 NOTE — MR AVS SNAPSHOT
Frannie JC Janina   3/17/2017   Anticoagulation Therapy Visit    Description:  81 year old female   Provider:  Radha Gould, RN   Department:  [facility]           INR as of 3/17/2017     Today's INR 1.68!      Anticoagulation Summary as of 3/17/2017     INR goal 2.0-3.0   Today's INR 1.68!   Full instructions 3/17: 10 mg; 3/18: 7.5 mg; 3/19: 7.5 mg   Next INR check 3/20/2017    Indications   Long-term (current) use of anticoagulants [Z79.01] [Z79.01]  Atrial fibrillation (H) [I48.91] [I48.91]         March 2017 Details    Sun Mon Tue Wed Thu Fri Sat        1               2               3               4                 5               6               7               8               9               10               11                 12               13               14               15               16               17      10 mg   See details      18      7.5 mg           19      7.5 mg         20            21               22               23               24               25                 26               27               28               29               30               31                 Date Details   03/17 This INR check       Date of next INR:  3/20/2017         How to take your warfarin dose     To take:  7.5 mg Take 1.5 of the 5 mg tablets.    To take:  10 mg Take 2 of the 5 mg tablets.

## 2017-03-17 NOTE — TELEPHONE ENCOUNTER
"Pt called with questions on her simvastatin and lasix. First, patient is wondering if she should be taking simvastatin, as an ER doctor recently told her to stop taking it. I told pt that it is on her med list from her most recent visit with Dr. Altamirano, but pt wanted to have us check with Dr. Altamirano to be sure that was accurate. Second, pt does not feel that she needs to be on lasix, stating, \"I don't have any swelling and I think it is dehydrating me. I have been feeling a little light headed after starting it.\" Pt would like a call back to to have these medications clarified with her.  "

## 2017-03-17 NOTE — PROGRESS NOTES
Patient called with questions concerning her Lasix and Simvastatin medications. She does not feel she needs to take them. consulted with Dr. Altamirano and he agrees patient does not need to take these medications. Patient was also scheduled for LAKEISHA to evaluate MV disease on Mon.3/20. At Dr Altamirano's request this procedure was moved to Thurs 3/30 with Pedro Evans.  Pt. will see Dr. Altamirano in clinic later that same day.  She is agreeable to rescheduling to later date.she continues to be followed by INR

## 2017-03-17 NOTE — PROGRESS NOTES
"  ANTICOAGULATION FOLLOW-UP CLINIC VISIT    Patient Name:  Frannie Roa  Date:  3/17/2017  Contact Type:  Telephone    SUBJECTIVE:     Patient Findings     Positives Initiation of therapy           OBJECTIVE    INR   Date Value Ref Range Status   03/17/2017 1.68 (H) 0.86 - 1.14 Final       ASSESSMENT / PLAN  INR assessment SUB    Recheck INR In: 3 DAYS    INR Location Clinic      Anticoagulation Summary as of 3/17/2017     INR goal 2.0-3.0   Today's INR 1.68!   Maintenance plan No maintenance plan   Full instructions 3/17: 10 mg; 3/18: 7.5 mg; 3/19: 7.5 mg   Plan last modified Radha Gould RN (3/17/2017)   Next INR check 3/20/2017   Priority INR   Target end date     Indications   Long-term (current) use of anticoagulants [Z79.01] [Z79.01]  Atrial fibrillation (H) [I48.91] [I48.91]         Anticoagulation Episode Summary     INR check location     Preferred lab     Send INR reminders to [facility]    Comments INR goal range +++2.2-2.7+++      Anticoagulation Care Providers     Provider Role Specialty Phone number    Asim Altamirano MD Responsible Cardiology [phone]            See the Encounter Report to view Anticoagulation Flowsheet and Dosing Calendar (Go to Encounters tab in chart review, and find the Anticoagulation Therapy Visit)    Spoke with Frannie. She has several questions: does not understand why she's on a diuretic as she \"doesn't retain water,\" and feels that she is now dehydrated. Also, claims that the ED doctor told her to stop her statin due to being on warfarin, so she did. This does not make sense. Advised her to call the cardiology clinic regarding these things.  Informed her that she can continue to eat greens (was told not to in the ED) and explained that she should remain consistent.   Will continue Lovenox BID and take warfarin as documented.     Radha Gould, MILES        Spoke with Sophie Jaime RN. Frannie will have her LAKEISHA on 3/30 (was planned for 3/20). " -MM

## 2017-03-18 LAB
ANION GAP SERPL CALCULATED.3IONS-SCNC: 9 MMOL/L (ref 3–14)
BUN SERPL-MCNC: 18 MG/DL (ref 7–30)
CALCIUM SERPL-MCNC: 8.9 MG/DL (ref 8.5–10.1)
CHLORIDE SERPL-SCNC: 104 MMOL/L (ref 94–109)
CHOLEST SERPL-MCNC: 233 MG/DL
CO2 SERPL-SCNC: 28 MMOL/L (ref 20–32)
CREAT SERPL-MCNC: 0.71 MG/DL (ref 0.52–1.04)
GFR SERPL CREATININE-BSD FRML MDRD: 79 ML/MIN/1.7M2
GLUCOSE SERPL-MCNC: 76 MG/DL (ref 70–99)
HDLC SERPL-MCNC: 68 MG/DL
LDLC SERPL CALC-MCNC: 147 MG/DL
NONHDLC SERPL-MCNC: 165 MG/DL
POTASSIUM SERPL-SCNC: 4.4 MMOL/L (ref 3.4–5.3)
SODIUM SERPL-SCNC: 141 MMOL/L (ref 133–144)
TRIGL SERPL-MCNC: 89 MG/DL

## 2017-03-20 ENCOUNTER — ANTICOAGULATION THERAPY VISIT (OUTPATIENT)
Dept: ANTICOAGULATION | Facility: CLINIC | Age: 82
End: 2017-03-20
Payer: MEDICARE

## 2017-03-20 DIAGNOSIS — Z79.01 LONG-TERM (CURRENT) USE OF ANTICOAGULANTS: ICD-10-CM

## 2017-03-20 DIAGNOSIS — I48.20 CHRONIC ATRIAL FIBRILLATION (H): Primary | ICD-10-CM

## 2017-03-20 LAB — INR POINT OF CARE: 2.1 (ref 0.86–1.14)

## 2017-03-20 PROCEDURE — 99207 ZZC NO CHARGE NURSE ONLY: CPT

## 2017-03-20 PROCEDURE — 85610 PROTHROMBIN TIME: CPT | Mod: QW

## 2017-03-20 PROCEDURE — 36416 COLLJ CAPILLARY BLOOD SPEC: CPT

## 2017-03-20 NOTE — MR AVS SNAPSHOT
Frannie JC Janina   3/20/2017 9:45 AM   Anticoagulation Therapy Visit    Description:  81 year old female   Provider:  [facility]   Department:  [facility]           INR as of 3/20/2017     Today's INR 2.1      Anticoagulation Summary as of 3/20/2017     INR goal 2.0-3.0   Today's INR 2.1   Full instructions 3/20: 7.5 mg; 3/21: 5 mg; 3/22: 7.5 mg   Next INR check 3/23/2017    Indications   Long-term (current) use of anticoagulants [Z79.01] [Z79.01]  Atrial fibrillation (H) [I48.91] [I48.91]         Your next Anticoagulation Clinic appointment(s)     Mar 23, 2017  4:00 PM CDT   Anticoagulation Visit with [facility]   [facility]    [address] [facility] [address]  [facility] [id number] [phone]              Contact Numbers     [facility] Phone Numbers:  Anticoagulation Clinic Appointments : [phone]  Anticoagulation Nurse: [phone]         March 2017 Details    Sun Mon Tue Wed Thu Fri Sat        1               2               3               4                 5               6               7               8               9               10               11                 12               13               14               15               16               17               18                 19               20      7.5 mg   See details      21      5 mg         22      7.5 mg         23            24               25                 26               27               28               29               30               31                 Date Details   03/20 This INR check       Date of next INR:  3/23/2017         How to take your warfarin dose     To take:  5 mg Take 1 of the 5 mg tablets.    To take:  7.5 mg Take 1.5 of the 5 mg tablets.

## 2017-03-20 NOTE — PROGRESS NOTES
ANTICOAGULATION FOLLOW-UP CLINIC VISIT    Patient Name:  Frannie Roa  Date:  3/20/2017  Contact Type:  Face to Face    SUBJECTIVE:     Patient Findings     Positives No Problem Findings    Comments Pt is taking Lovenox, will get dosing direction from [facility], had to put something on the calendar to close visit.           OBJECTIVE    INR Protime   Date Value Ref Range Status   03/20/2017 2.1 (A) 0.86 - 1.14 Final       ASSESSMENT / PLAN  INR assessment THER    Recheck INR In: 3 DAYS    INR Location Clinic      Anticoagulation Summary as of 3/20/2017     INR goal 2.0-3.0   Today's INR 2.1   Maintenance plan No maintenance plan   Full instructions 3/20: 7.5 mg; 3/21: 5 mg; 3/22: 7.5 mg   Plan last modified Radha Gould RN (3/17/2017)   Next INR check 3/23/2017   Priority INR   Target end date     Indications   Long-term (current) use of anticoagulants [Z79.01] [Z79.01]  Atrial fibrillation (H) [I48.91] [I48.91]         Anticoagulation Episode Summary     INR check location     Preferred lab     Send INR reminders to [facility]    Comments INR goal range +++2.2-2.7+++      Anticoagulation Care Providers     Provider Role Specialty Phone number    Asim Altamirano MD Responsible Cardiology [phone]            See the Encounter Report to view Anticoagulation Flowsheet and Dosing Calendar (Go to Encounters tab in chart review, and find the Anticoagulation Therapy Visit)    Dosage adjustment made based on physician directed care plan.    Yadi Anderson, RN       NOTE:  Writer clarifies INR goal range today as two goal ranges listed on Clinic Referral order from 3/15.  Per Edwina in Cardiology, goal range to be; 2.2 - 2.7.  New Clinic Referral order sent to MD for signature today.  Writer speaks with Yadi from [facility].  Pt desires to get fingerstick INR @ [facility], and MD desires to have [facility] dose the pt.  [facility] staff unable to perform fingerstick INR without documenting, as this  creates an issue with their statistics.  Conversation with DEAN [facility] re: this, then again with Yadi.  On 3/21, Yadi to speak with Edwina her supervisor re: a potential solution here.  The possibility of two parties giving coumadin instructions is a concern.  On this date, writer speaks with Frannie & advises the following: coumadin 7.5 mg today, 5 mg on 3/21, with next INR on 3/22.  Lovenox to continue, 70 mg SQ q 12 hr until INR therapeutic.  Pt verbalizes understanding.  # for Yadi @ [facility]: [phone]

## 2017-03-21 ENCOUNTER — DOCUMENTATION ONLY (OUTPATIENT)
Dept: CARDIOLOGY | Facility: CLINIC | Age: 82
End: 2017-03-21

## 2017-03-21 ENCOUNTER — ANTICOAGULATION THERAPY VISIT (OUTPATIENT)
Dept: ANTICOAGULATION | Facility: CLINIC | Age: 82
End: 2017-03-21

## 2017-03-21 DIAGNOSIS — I48.91 ATRIAL FIBRILLATION (H): ICD-10-CM

## 2017-03-21 DIAGNOSIS — Z79.01 LONG-TERM (CURRENT) USE OF ANTICOAGULANTS: ICD-10-CM

## 2017-03-21 NOTE — PROGRESS NOTES
Spoke with Douglas [facility]rd [facility].  He states that typically patient that prefers fingerstick will go to the lab, not INR clinic unless the INR clinic is managing.  He also states that most of the [facility] labs are able to do a fingerstick.  Spoke with Maida, supervisor in lab.   lab is not trained to do a fingerstick INR and does not have a coaguchek machine available in lab.  Maida called another lab supervisor and was told that [place], [place], [place], and [place] lab are able to do a fingerstick.  Called patient and notified her of this.  The [place] clinic is not far for her to go.  Assisted her to schedule an appointment with the [facility] lab for tomorrow when next INR is due.  Edwina Valencia RN

## 2017-03-21 NOTE — PROGRESS NOTES
Patient will go to the [place] [facility] and have a finger stick INR.  Patient's INRs will be monitored by the [facility]  Patient has take the following doses: 3/20  7.5mgs, 3/21 5mgs; 3/22  7.5mgs. Next INR will be on 7/23/17

## 2017-03-22 ENCOUNTER — ANTICOAGULATION THERAPY VISIT (OUTPATIENT)
Dept: ANTICOAGULATION | Facility: CLINIC | Age: 82
End: 2017-03-22

## 2017-03-22 DIAGNOSIS — Z79.01 LONG-TERM (CURRENT) USE OF ANTICOAGULANTS: ICD-10-CM

## 2017-03-22 DIAGNOSIS — I48.0 PAROXYSMAL ATRIAL FIBRILLATION (H): ICD-10-CM

## 2017-03-22 DIAGNOSIS — I48.91 ATRIAL FIBRILLATION (H): ICD-10-CM

## 2017-03-22 LAB — INR PPP: 1.6 (ref 0.86–1.14)

## 2017-03-22 PROCEDURE — 85610 PROTHROMBIN TIME: CPT | Performed by: FAMILY MEDICINE

## 2017-03-22 PROCEDURE — 36416 COLLJ CAPILLARY BLOOD SPEC: CPT | Performed by: FAMILY MEDICINE

## 2017-03-22 NOTE — MR AVS SNAPSHOT
Frannie JC Janina   3/22/2017   Anticoagulation Therapy Visit    Description:  81 year old female   Provider:  Radha Gould, RN   Department:  [facility]           INR as of 3/22/2017     Today's INR 1.60!      Anticoagulation Summary as of 3/22/2017     INR goal 2.0-3.0   Today's INR 1.60!   Full instructions 3/22: 10 mg; 3/23: 10 mg   Next INR check 3/24/2017    Indications   Long-term (current) use of anticoagulants [Z79.01] [Z79.01]  Atrial fibrillation (H) [I48.91] [I48.91]         March 2017 Details    Sun Mon Tue Wed Thu Fri Sat        1               2               3               4                 5               6               7               8               9               10               11                 12               13               14               15               16               17               18                 19               20               21               22      10 mg   See details      23      10 mg         24            25                 26               27               28               29               30               31                 Date Details   03/22 This INR check       Date of next INR:  3/24/2017         How to take your warfarin dose     To take:  10 mg Take 2 of the 5 mg tablets.

## 2017-03-22 NOTE — PROGRESS NOTES
ANTICOAGULATION FOLLOW-UP CLINIC VISIT    Patient Name:  Frannie Roa  Date:  3/22/2017  Contact Type:  Telephone    SUBJECTIVE:     Patient Findings     Positives No Problem Findings           OBJECTIVE    INR   Date Value Ref Range Status   03/22/2017 1.60 (H) 0.86 - 1.14 Final     Comment:     This test is intended for monitoring Coumadin therapy.  Results are not   accurate   in patients with prolonged INR due to factor deficiency.         ASSESSMENT / PLAN  INR assessment SUB    Recheck INR In: 2 DAYS    INR Location Clinic      Anticoagulation Summary as of 3/22/2017     INR goal 2.0-3.0   Today's INR 1.60!   Maintenance plan No maintenance plan   Full instructions 3/22: 10 mg; 3/23: 10 mg   Plan last modified Radha Gould RN (3/17/2017)   Next INR check 3/24/2017   Priority INR   Target end date     Indications   Long-term (current) use of anticoagulants [Z79.01] [Z79.01]  Atrial fibrillation (H) [I48.91] [I48.91]         Anticoagulation Episode Summary     INR check location     Preferred lab     Send INR reminders to [facility]    Comments As of 3/20/17: +++++ INR goal range +++2.2-2.7++  3/21/17 Patient will have a finger stick INR done at the [facility] then [facility] will manage /rcj       Anticoagulation Care Providers     Provider Role Specialty Phone number    Asim Altamirano MD [facility] Cardiology [phone]            See the Encounter Report to view Anticoagulation Flowsheet and Dosing Calendar (Go to Encounters tab in chart review, and find the Anticoagulation Therapy Visit)    Spoke with Frannie. Needs INR in range for LAKEISHA 4/6.    Radha Gould, RN

## 2017-03-24 ENCOUNTER — ANTICOAGULATION THERAPY VISIT (OUTPATIENT)
Dept: ANTICOAGULATION | Facility: CLINIC | Age: 82
End: 2017-03-24

## 2017-03-24 DIAGNOSIS — I48.0 PAROXYSMAL ATRIAL FIBRILLATION (H): ICD-10-CM

## 2017-03-24 DIAGNOSIS — Z79.01 LONG-TERM (CURRENT) USE OF ANTICOAGULANTS: ICD-10-CM

## 2017-03-24 DIAGNOSIS — I48.91 ATRIAL FIBRILLATION (H): ICD-10-CM

## 2017-03-24 LAB — INR PPP: 1.7 (ref 0.86–1.14)

## 2017-03-24 PROCEDURE — 85610 PROTHROMBIN TIME: CPT | Performed by: FAMILY MEDICINE

## 2017-03-24 PROCEDURE — 36416 COLLJ CAPILLARY BLOOD SPEC: CPT | Performed by: FAMILY MEDICINE

## 2017-03-24 NOTE — MR AVS SNAPSHOT
Frannie JC Janina   3/24/2017   Anticoagulation Therapy Visit    Description:  81 year old female   Provider:  Radha Eckert, RN   Department:  [facility]           INR as of 3/24/2017     Today's INR 1.70!      Anticoagulation Summary as of 3/24/2017     INR goal 2.0-3.0   Today's INR 1.70!   Full instructions 3/24: 10 mg; 3/25: 10 mg; 3/26: 10 mg   Next INR check 3/27/2017    Indications   Long-term (current) use of anticoagulants [Z79.01] [Z79.01]  Atrial fibrillation (H) [I48.91] [I48.91]         Your next Anticoagulation Clinic appointment(s)     Mar 27, 2017 10:45 AM CDT   Anticoagulation Visit with  ANT[facility]AGULATION CLINIC   [facility]    [address] [place] [id number]   [phone]              March 2017 Details    Sun Mon Tue Wed Thu Fri Sat        1               2               3               4                 5               6               7               8               9               10               11                 12               13               14               15               16               17               18                 19               20               21               22               23               24      10 mg   See details      25      10 mg           26      10 mg         27            28               29               30               31                 Date Details   03/24 This INR check       Date of next INR:  3/27/2017         How to take your warfarin dose     To take:  10 mg Take 2 of the 5 mg tablets.

## 2017-03-24 NOTE — PROGRESS NOTES
ANTICOAGULATION FOLLOW-UP CLINIC VISIT    Patient Name:  Frannie Roa  Date:  3/24/2017  Contact Type:  Telephone    SUBJECTIVE:     Patient Findings     Positives Initiation of therapy           OBJECTIVE    INR   Date Value Ref Range Status   03/24/2017 1.70 (H) 0.86 - 1.14 Final     Comment:     This test is intended for monitoring Coumadin therapy.  Results are not   accurate   in patients with prolonged INR due to factor deficiency.         ASSESSMENT / PLAN  INR assessment SUB    Recheck INR In: 3 DAYS    INR Location Clinic      Anticoagulation Summary as of 3/24/2017     INR goal 2.0-3.0   Today's INR 1.70!   Maintenance plan No maintenance plan   Full instructions 3/24: 10 mg; 3/25: 10 mg; 3/26: 10 mg   Plan last modified Radha Gould RN (3/17/2017)   Next INR check 3/27/2017   Priority INR   Target end date     Indications   Long-term (current) use of anticoagulants [Z79.01] [Z79.01]  Atrial fibrillation (H) [I48.91] [I48.91]         Anticoagulation Episode Summary     INR check location     Preferred lab     Send INR reminders to [facility]    Comments As of 3/20/17: +++++ INR goal range +++2.2-2.7++  3/21/17 Patient will have a finger stick INR done at the [facility] then [facility] will manage /rcj       Anticoagulation Care Providers     Provider Role Specialty Phone number    Asim Altamirano MD Responsible Cardiology [phone]            See the Encounter Report to view Anticoagulation Flowsheet and Dosing Calendar (Go to Encounters tab in chart review, and find the Anticoagulation Therapy Visit)    Spoke to Frannie.      Radha Eckert RN

## 2017-03-27 ENCOUNTER — ANTICOAGULATION THERAPY VISIT (OUTPATIENT)
Dept: ANTICOAGULATION | Facility: CLINIC | Age: 82
End: 2017-03-27

## 2017-03-27 DIAGNOSIS — Z79.01 LONG-TERM (CURRENT) USE OF ANTICOAGULANTS: ICD-10-CM

## 2017-03-27 DIAGNOSIS — I48.0 PAROXYSMAL ATRIAL FIBRILLATION (H): ICD-10-CM

## 2017-03-27 DIAGNOSIS — I48.91 ATRIAL FIBRILLATION (H): ICD-10-CM

## 2017-03-27 LAB — INR PPP: 2.4 (ref 0.86–1.14)

## 2017-03-27 PROCEDURE — 85610 PROTHROMBIN TIME: CPT | Performed by: INTERNAL MEDICINE

## 2017-03-27 PROCEDURE — 36415 COLL VENOUS BLD VENIPUNCTURE: CPT | Performed by: INTERNAL MEDICINE

## 2017-03-27 NOTE — PROGRESS NOTES
ANTICOAGULATION FOLLOW-UP CLINIC VISIT    Patient Name:  Frannie Roa  Date:  3/27/2017  Contact Type:  Telephone    SUBJECTIVE:     Patient Findings     Positives Other complaints (states has some slight dizziness--she will monitor and call her primary if it continues or gets worse)    Comments Has LAKEISHA scheduled 4/6/17--INR needs to be therapeutic for this           OBJECTIVE    INR   Date Value Ref Range Status   03/27/2017 2.40 (H) 0.86 - 1.14 Final     Comment:     This test is intended for monitoring Coumadin therapy.  Results are not   accurate   in patients with prolonged INR due to factor deficiency.         ASSESSMENT / PLAN  INR assessment THER    Recheck INR In: 3 DAYS    INR Location Clinic      Anticoagulation Summary as of 3/27/2017     INR goal 2.0-3.0   Today's INR 2.40   Maintenance plan No maintenance plan   Full instructions 3/27: 7.5 mg; 3/28: 10 mg; 3/29: 10 mg   Plan last modified Radha Gould RN (3/17/2017)   Next INR check 3/30/2017   Priority INR   Target end date     Indications   Long-term (current) use of anticoagulants [Z79.01] [Z79.01]  Atrial fibrillation (H) [I48.91] [I48.91]         Anticoagulation Episode Summary     INR check location     Preferred lab     Send INR reminders to [facility]    Comments As of 3/20/17: +++++ INR goal range +++2.2-2.7++  3/21/17 Patient will have a finger stick INR done at [facility] then [facility] will manage /rcj       Anticoagulation Care Providers     Provider Role Specialty Phone number    Asim Altamirano MD [facility] Cardiology [phone]            See the Encounter Report to view Anticoagulation Flowsheet and Dosing Calendar (Go to Encounters tab in chart review, and find the Anticoagulation Therapy Visit)    Spoke with Frannie.  She has some slight dizziness occasionally.  Advised she contact her primary provider about this--she is going to monitor and if dizziness continues or becomes worse, she will  talk with her primary provider.  She states she is hydrated.    Radha Eckert RN

## 2017-03-27 NOTE — MR AVS SNAPSHOT
Frannie JC Janina   3/27/2017   Anticoagulation Therapy Visit    Description:  81 year old female   Provider:  Radha Eckert, RN   Department:  [facility]           INR as of 3/27/2017     Today's INR 2.40      Anticoagulation Summary as of 3/27/2017     INR goal 2.0-3.0   Today's INR 2.40   Full instructions 3/27: 7.5 mg; 3/28: 10 mg; 3/29: 10 mg   Next INR check 3/30/2017    Indications   Long-term (current) use of anticoagulants [Z79.01] [Z79.01]  Atrial fibrillation (H) [I48.91] [I48.91]         March 2017 Details    Sun Mon Tue Wed Thu Fri Sat        1               2               3               4                 5               6               7               8               9               10               11                 12               13               14               15               16               17               18                 19               20               21               22               23               24               25                 26               27      7.5 mg   See details      28      10 mg         29      10 mg         30            31                 Date Details   03/27 This INR check       Date of next INR:  3/30/2017         How to take your warfarin dose     To take:  7.5 mg Take 1.5 of the 5 mg tablets.    To take:  10 mg Take 2 of the 5 mg tablets.

## 2017-03-30 ENCOUNTER — ANTICOAGULATION THERAPY VISIT (OUTPATIENT)
Dept: ANTICOAGULATION | Facility: CLINIC | Age: 82
End: 2017-03-30

## 2017-03-30 DIAGNOSIS — I48.0 PAROXYSMAL ATRIAL FIBRILLATION (H): ICD-10-CM

## 2017-03-30 DIAGNOSIS — Z79.01 LONG-TERM (CURRENT) USE OF ANTICOAGULANTS: ICD-10-CM

## 2017-03-30 DIAGNOSIS — I48.91 ATRIAL FIBRILLATION (H): ICD-10-CM

## 2017-03-30 LAB — INR PPP: 3.5 (ref 0.86–1.14)

## 2017-03-30 PROCEDURE — 85610 PROTHROMBIN TIME: CPT | Performed by: FAMILY MEDICINE

## 2017-03-30 PROCEDURE — 36416 COLLJ CAPILLARY BLOOD SPEC: CPT | Performed by: FAMILY MEDICINE

## 2017-03-30 NOTE — PROGRESS NOTES
Frannie calls back to report that she is very nervous about her INR being 3.5.  She would like today take 7.5mg daily over the weekend and wants to take a 5mg dose today. Explained that if INR drops to much she will not be able to have her procedure on 4/6.  Frannie states that dosing can be increased on 4/3 if needed.

## 2017-03-30 NOTE — PROGRESS NOTES
ANTICOAGULATION FOLLOW-UP CLINIC VISIT    Patient Name:  Frannie Roa  Date:  3/30/2017  Contact Type:  Telephone    SUBJECTIVE:     Patient Findings     Comments Frannie is having a LAKEISHA 4/6 and INR needs to be within goal range.           OBJECTIVE    INR   Date Value Ref Range Status   03/30/2017 3.50 (H) 0.86 - 1.14 Final     Comment:     This test is intended for monitoring Coumadin therapy.  Results are not   accurate   in patients with prolonged INR due to factor deficiency.         ASSESSMENT / PLAN  INR assessment SUPRA    Recheck INR In: 4 DAYS    INR Location Clinic      Anticoagulation Summary as of 3/30/2017     INR goal 2.0-3.0   Today's INR 3.50!   Maintenance plan No maintenance plan   Full instructions 3/30: 7.5 mg; 3/31: 7.5 mg; 4/1: 7.5 mg; 4/2: 10 mg   Plan last modified Radha Gould, RN (3/17/2017)   Next INR check 4/3/2017   Priority INR   Target end date     Indications   Long-term (current) use of anticoagulants [Z79.01] [Z79.01]  Atrial fibrillation (H) [I48.91] [I48.91]         Anticoagulation Episode Summary     INR check location     Preferred lab     Send INR reminders to [facility]    Comments As of 3/20/17: +++++ INR goal range +++2.2-2.7++  3/21/17 Patient will have a finger stick INR done at the [facility] then [email or web address] will manage /rcj       Anticoagulation Care Providers     Provider Role Specialty Phone number    Asim Altamirano MD Responsible Cardiology [phone]            See the Encounter Report to view Anticoagulation Flowsheet and Dosing Calendar (Go to Encounters tab in chart review, and find the Anticoagulation Therapy Visit)    Spoke with Karine London, MILES

## 2017-03-30 NOTE — MR AVS SNAPSHOT
Frannie JC Janina   3/30/2017   Anticoagulation Therapy Visit    Description:  81 year old female   Provider:  Priscilla London, RN   Department:  [facility]           INR as of 3/30/2017     Today's INR 3.50!      Anticoagulation Summary as of 3/30/2017     INR goal 2.0-3.0   Today's INR 3.50!   Full instructions 3/30: 7.5 mg; 3/31: 7.5 mg; 4/1: 7.5 mg; 4/2: 10 mg   Next INR check 4/3/2017    Indications   Long-term (current) use of anticoagulants [Z79.01] [Z79.01]  Atrial fibrillation (H) [I48.91] [I48.91]         March 2017 Details    Sun Mon Tue Wed Thu Fri Sat        1               2               3               4                 5               6               7               8               9               10               11                 12               13               14               15               16               17               18                 19               20               21               22               23               24               25                 26               27               28               29               30      7.5 mg   See details      31      7.5 mg           Date Details   03/30 This INR check               How to take your warfarin dose     To take:  7.5 mg Take 1.5 of the 5 mg tablets.           April 2017 Details    Sun Mon Tue Wed Thu Fri Sat           1      7.5 mg           2      10 mg         3            4               5               6               7               8                 9               10               11               12               13               14               15                 16               17               18               19               20               21               22                 23               24               25               26               27               28               29                 30                      Date Details   No additional details    Date of next INR:  4/3/2017          How to take your warfarin dose     To take:  7.5 mg Take 1.5 of the 5 mg tablets.    To take:  10 mg Take 2 of the 5 mg tablets.

## 2017-04-03 ENCOUNTER — ANTICOAGULATION THERAPY VISIT (OUTPATIENT)
Dept: ANTICOAGULATION | Facility: CLINIC | Age: 82
End: 2017-04-03

## 2017-04-03 DIAGNOSIS — I48.0 PAROXYSMAL ATRIAL FIBRILLATION (H): ICD-10-CM

## 2017-04-03 DIAGNOSIS — Z79.01 LONG-TERM (CURRENT) USE OF ANTICOAGULANTS: ICD-10-CM

## 2017-04-03 DIAGNOSIS — I48.91 ATRIAL FIBRILLATION, UNSPECIFIED TYPE (H): ICD-10-CM

## 2017-04-03 LAB — INR PPP: 2.3 (ref 0.86–1.14)

## 2017-04-03 PROCEDURE — 36416 COLLJ CAPILLARY BLOOD SPEC: CPT | Performed by: FAMILY MEDICINE

## 2017-04-03 PROCEDURE — 85610 PROTHROMBIN TIME: CPT | Performed by: FAMILY MEDICINE

## 2017-04-03 NOTE — MR AVS SNAPSHOT
Frannie BABITA Roa   4/3/2017   Anticoagulation Therapy Visit    Description:  81 year old female   Provider:  Mary Sepulveda, RN   Department:  [facility]           INR as of 4/3/2017     Today's INR 2.30      Anticoagulation Summary as of 4/3/2017     INR goal 2.0-3.0   Today's INR 2.30   Full instructions 4/3: 7.5 mg; 4/4: 7.5 mg; 4/5: 7.5 mg   Next INR check 4/6/2017    Indications   Long-term (current) use of anticoagulants [Z79.01] [Z79.01]  Atrial fibrillation (H) [I48.91] [I48.91]         April 2017 Details    Sun Mon Tue Wed Thu Fri Sat           1                 2               3      7.5 mg   See details      4      7.5 mg         5      7.5 mg         6            7               8                 9               10               11               12               13               14               15                 16               17               18               19               20               21               22                 23               24               25               26               27               28               29                 30                      Date Details   04/03 This INR check       Date of next INR:  4/6/2017         How to take your warfarin dose     To take:  7.5 mg Take 1.5 of the 5 mg tablets.

## 2017-04-03 NOTE — PROGRESS NOTES
ANTICOAGULATION FOLLOW-UP CLINIC VISIT    Patient Name:  Frannie Roa  Date:  4/3/2017  Contact Type:  Telephone    SUBJECTIVE:     Patient Findings     Comments 4/6-Upcoming procedure.           OBJECTIVE    INR   Date Value Ref Range Status   04/03/2017 2.30 (H) 0.86 - 1.14 Final     Comment:     This test is intended for monitoring Coumadin therapy.  Results are not   accurate   in patients with prolonged INR due to factor deficiency.         ASSESSMENT / PLAN  INR assessment THER    Recheck INR In: 3 DAYS    INR Location Clinic      Anticoagulation Summary as of 4/3/2017     INR goal 2.0-3.0   Today's INR 2.30   Maintenance plan No maintenance plan   Full instructions 4/3: 7.5 mg; 4/4: 7.5 mg; 4/5: 7.5 mg   Plan last modified Radha Gould RN (3/17/2017)   Next INR check 4/6/2017   Priority INR   Target end date     Indications   Long-term (current) use of anticoagulants [Z79.01] [Z79.01]  Atrial fibrillation (H) [I48.91] [I48.91]         Anticoagulation Episode Summary     INR check location     Preferred lab     Send INR reminders to [facility]    Comments As of 3/20/17: +++++ INR goal range +++2.2-2.7++  3/21/17 Patient will have a finger stick INR done at the [facility] then [facility] will manage /rcj       Anticoagulation Care Providers     Provider Role Specialty Phone number    Asim Altamirano MD Responsible Cardiology [phone]            See the Encounter Report to view Anticoagulation Flowsheet and Dosing Calendar (Go to Encounters tab in chart review, and find the Anticoagulation Therapy Visit)    LM for patient.    Mary Sepulveda RN

## 2017-04-06 ENCOUNTER — HOSPITAL ENCOUNTER (OUTPATIENT)
Facility: CLINIC | Age: 82
Discharge: HOME | End: 2017-04-06
Attending: INTERNAL MEDICINE | Admitting: INTERNAL MEDICINE
Payer: MEDICARE

## 2017-04-06 ENCOUNTER — OFFICE VISIT (OUTPATIENT)
Dept: CARDIOLOGY | Facility: CLINIC | Age: 82
End: 2017-04-06
Attending: INTERNAL MEDICINE
Payer: MEDICARE

## 2017-04-06 ENCOUNTER — TRANSFERRED RECORDS (OUTPATIENT)
Dept: HEALTH INFORMATION MANAGEMENT | Facility: CLINIC | Age: 82
End: 2017-04-06

## 2017-04-06 VITALS
DIASTOLIC BLOOD PRESSURE: 56 MMHG | HEART RATE: 56 BPM | OXYGEN SATURATION: 94 % | SYSTOLIC BLOOD PRESSURE: 95 MMHG | RESPIRATION RATE: 16 BRPM

## 2017-04-06 VITALS
OXYGEN SATURATION: 98 % | HEIGHT: 63 IN | DIASTOLIC BLOOD PRESSURE: 63 MMHG | HEART RATE: 57 BPM | BODY MASS INDEX: 21.79 KG/M2 | SYSTOLIC BLOOD PRESSURE: 96 MMHG | WEIGHT: 123 LBS

## 2017-04-06 DIAGNOSIS — I48.0 PAROXYSMAL ATRIAL FIBRILLATION (H): ICD-10-CM

## 2017-04-06 DIAGNOSIS — I48.0 PAROXYSMAL ATRIAL FIBRILLATION (H): Primary | ICD-10-CM

## 2017-04-06 DIAGNOSIS — I50.22 CHRONIC SYSTOLIC CONGESTIVE HEART FAILURE (H): ICD-10-CM

## 2017-04-06 DIAGNOSIS — Z79.01 LONG-TERM (CURRENT) USE OF ANTICOAGULANTS: ICD-10-CM

## 2017-04-06 DIAGNOSIS — I34.0 MYXOMATOUS MITRAL VALVE REGURGITATION: ICD-10-CM

## 2017-04-06 LAB — INR PPP: 2.13 (ref 0.86–1.14)

## 2017-04-06 PROCEDURE — 25000128 H RX IP 250 OP 636: Performed by: INTERNAL MEDICINE

## 2017-04-06 PROCEDURE — 36415 COLL VENOUS BLD VENIPUNCTURE: CPT | Performed by: INTERNAL MEDICINE

## 2017-04-06 PROCEDURE — 85610 PROTHROMBIN TIME: CPT | Performed by: INTERNAL MEDICINE

## 2017-04-06 PROCEDURE — 99212 OFFICE O/P EST SF 10 MIN: CPT | Mod: 25

## 2017-04-06 PROCEDURE — 93320 DOPPLER ECHO COMPLETE: CPT | Mod: 74

## 2017-04-06 PROCEDURE — 25000125 ZZHC RX 250: Performed by: INTERNAL MEDICINE

## 2017-04-06 PROCEDURE — 93325 DOPPLER ECHO COLOR FLOW MAPG: CPT | Mod: 74

## 2017-04-06 PROCEDURE — 99152 MOD SED SAME PHYS/QHP 5/>YRS: CPT

## 2017-04-06 PROCEDURE — 25000132 ZZH RX MED GY IP 250 OP 250 PS 637: Mod: GY | Performed by: INTERNAL MEDICINE

## 2017-04-06 PROCEDURE — 99212 OFFICE O/P EST SF 10 MIN: CPT

## 2017-04-06 PROCEDURE — 99214 OFFICE O/P EST MOD 30 MIN: CPT | Mod: ZP | Performed by: INTERNAL MEDICINE

## 2017-04-06 RX ORDER — FENTANYL CITRATE 50 UG/ML
25-50 INJECTION, SOLUTION INTRAMUSCULAR; INTRAVENOUS
Status: DISCONTINUED | OUTPATIENT
Start: 2017-04-06 | End: 2017-04-07 | Stop reason: HOSPADM

## 2017-04-06 RX ORDER — SODIUM CHLORIDE 9 MG/ML
INJECTION, SOLUTION INTRAVENOUS CONTINUOUS PRN
Status: DISCONTINUED | OUTPATIENT
Start: 2017-04-06 | End: 2017-04-07 | Stop reason: HOSPADM

## 2017-04-06 RX ORDER — FENTANYL CITRATE 50 UG/ML
25 INJECTION, SOLUTION INTRAMUSCULAR; INTRAVENOUS
Status: DISCONTINUED | OUTPATIENT
Start: 2017-04-06 | End: 2017-04-07 | Stop reason: HOSPADM

## 2017-04-06 RX ORDER — FLUMAZENIL 0.1 MG/ML
0.2 INJECTION, SOLUTION INTRAVENOUS
Status: DISCONTINUED | OUTPATIENT
Start: 2017-04-06 | End: 2017-04-07 | Stop reason: HOSPADM

## 2017-04-06 RX ORDER — NALOXONE HYDROCHLORIDE 0.4 MG/ML
.1-.4 INJECTION, SOLUTION INTRAMUSCULAR; INTRAVENOUS; SUBCUTANEOUS
Status: DISCONTINUED | OUTPATIENT
Start: 2017-04-06 | End: 2017-04-07 | Stop reason: HOSPADM

## 2017-04-06 RX ADMIN — LIDOCAINE HYDROCHLORIDE 30 ML: 20 SOLUTION ORAL; TOPICAL at 08:29

## 2017-04-06 RX ADMIN — BENZOCAINE 1 SPRAY: 220 SPRAY, METERED PERIODONTAL at 08:30

## 2017-04-06 RX ADMIN — FENTANYL CITRATE 75 MCG: 50 INJECTION, SOLUTION INTRAMUSCULAR; INTRAVENOUS at 08:43

## 2017-04-06 RX ADMIN — MIDAZOLAM 1.5 MG: 1 INJECTION INTRAMUSCULAR; INTRAVENOUS at 08:43

## 2017-04-06 ASSESSMENT — PAIN SCALES - GENERAL: PAINLEVEL: NO PAIN (0)

## 2017-04-06 NOTE — MR AVS SNAPSHOT
"              After Visit Summary   4/6/2017    Frannie Roa    MRN: [id number]           Patient Information     Date Of Birth          1935        Visit Information        Provider Department      4/6/2017 12:00 PM Asim Altamirano MD [facility]        Today's Diagnoses     Paroxysmal atrial fibrillation (H)    -  1       Follow-ups after your visit        Future tests that were ordered for you today     Open Standing Orders        Priority Remaining Interval Expires Ordered    Peripheral IV catheter Routine 1/1 CONDITIONAL X 1  4/6/2017            Who to contact     If you have questions or need follow up information about today's clinic visit or your schedule please contact [facility] directly at [phone].  Normal or non-critical lab and imaging results will be communicated to you by MyChart, letter or phone within 4 business days after the clinic has received the results. If you do not hear from us within 7 days, please contact the clinic through oomahart or phone. If you have a critical or abnormal lab result, we will notify you by phone as soon as possible.  Submit refill requests through [employer] or call your pharmacy and they will forward the refill request to us. Please allow 3 business days for your refill to be completed.          Additional Information About Your Visit        MyChart Information     [employer] lets you send messages to your doctor, view your test results, renew your prescriptions, schedule appointments and more. To sign up, go to [email or web address] . Click on \"Log in\" on the left side of the screen, which will take you to the Welcome page. Then click on \"Sign up Now\" on the right side of the page.     You will be asked to enter the access code listed below, as well as some personal information. Please follow the directions to create your username and password.     Your access code is: [email or web address]  Expires: 6/7/2017  7:53 PM     Your access code " "will  in 90 days. If you need help or a new code, please call your [facility] or [phone].        Care EveryWhere ID     This is your Care EveryWhere ID. This could be used by other organizations to access your [facility] medical records  [id number]        Your Vitals Were     Pulse Height Pulse Oximetry BMI (Body Mass Index)          57 1.6 m (5' 3\") 98% 21.79 kg/m2         Blood Pressure from Last 3 Encounters:   17 96/63   17 95/56   03/15/17 113/77    Weight from Last 3 Encounters:   17 55.8 kg (123 lb)   03/15/17 55.5 kg (122 lb 4.8 oz)   17 55.9 kg (123 lb 4.8 oz)              Today, you had the following     No orders found for display         Today's Medication Changes      Notice     This visit is during an admission. Changes to the med list made in this visit will be reflected in the After Visit Summary of the admission.             Primary Care Provider Office Phone # Fax #    Romelia Parker [phone]       [facility] [address]  [place] [address]        Thank you!     Thank you for choosing [facility]  for your care. Our goal is always to provide you with excellent care. Hearing back from our patients is one way we can continue to improve our services. Please take a few minutes to complete the written survey that you may receive in the mail after your visit with us. Thank you!             Your Updated Medication List - Protect others around you: Learn how to safely use, store and throw away your medicines at www.disposemymeds.org.      Notice     This visit is during an admission. Changes to the med list made in this visit will be reflected in the After Visit Summary of the admission.      "

## 2017-04-06 NOTE — PROCEDURES
Transesophageal echocardiogram was attempted in the presence of Dr. Lucas Vasquez.  Fentanyl 75 mcg and Versed 1.5 mg were administered. Patient was very resistant and did not tolerate GI intubation. We also encountered difficult passage in posterior pharynx likely due to cervical bony spurs. We recommend LAKEISHA with anaesthesia assistance in future.    Estephania Toscano MD  Cardiology Fellow, PGY-6  Pager: [phone]

## 2017-04-06 NOTE — LETTER
2017      RE: Frannie Estrada  [address]  [facility] [address]       Dear Colleague,    Thank you for the opportunity to participate in the care of your patient, Frannie Estrada, at the [facility]. Please see a copy of my visit note below.    Patient returns for follow-up visit and was unable to have LAKEISHA today as had difficult posterior pharynx to pass as well as required large amounts of anesthesia and operators did not feel she was adequately sedated-- therefore suggest general anesthesia in OR.     She feels she is deteriorating over the last four months with increased fatigue (perhaps complemented by beta blocker) and decreased exercise tolerance.  Reverted to sinus rhythm.  She has no PND, orthopnea, ankle edema, palpitation, pre-syncope or syncope.    No current facility-administered medications for this visit.      No current outpatient prescriptions on file.     Facility-Administered Medications Ordered in Other Visits   Medication     0.9% sodium chloride infusion     midazolam (VERSED) injection 0.5-1 mg    Followed by     midazolam (VERSED) injection 0.5 mg     fentaNYL Citrate (PF) (SUBLIMAZE) injection 25-50 mcg    Followed by     fentaNYL Citrate (PF) (SUBLIMAZE) injection 25 mcg     naloxone (NARCAN) injection 0.1-0.4 mg     flumazenil (ROMAZICON) injection 0.2 mg   Results for FRANNIE ESTRADA (MRN [id number]) as of 2017 18:00   Ref. Range 3/22/2017 09:22 3/24/2017 09:07 3/27/2017 10:42 3/30/2017 13:20 4/3/2017 09:51 2017 10:11   INR Latest Ref Range: 0.86 - 1.14  1.60 (H) 1.70 (H) 2.40 (H) 3.50 (H) 2.30 (H) 2.13 (H)   Name: FRANNIE ESTRADA  MRN: [id number]  : 1935  Study Date: 03/15/2017 08:22 AM  Age: 81 yrs  Gender: Female  Patient Location: [facility]  Reason For Study: , Palpitations  Ordering Physician: VICKI CORDERO  Referring Physician: VICKI CORDERO  Performed By: Iris  AMITA Salcedo     BSA: 1.6 m2  Height: 63 in  Weight: 123 lb  BP: 118/76 mmHg  _____________________________________________________________________________  __        Procedure  Echocardiogram with two-dimensional, color and spectral Doppler performed.  Limited Echocardiogram with portions of two-dimensional, color and spectral  Doppler performed.  _____________________________________________________________________________  __        Interpretation Summary  Global and regional left ventricular function is normal with an EF of 60-65%.  The LV filling pattern is c/w grade III diastolic dysfunction and increased  left atrial pressure.  Global right ventricular function is normal.  Severe left atrial enlargement.  Moderate mitral annular calcification. Bileaflet mitral valve prolapse is  present.  Moderate to severe mitral insufficiency. The ERO is 30 mm2 and the RVol is 54  mL.  Pulmonary artery systolic pressure is normal.  The inferior vena cava is normal in size with preserved respiratory  variability. Estimated mean right atrial pressure is <3 mmHg.  No pericardial effusion is present.  This study was compared with the study from 10/14/2016. The degree of MR is  quantified as described above.  _____________________________________________________________________________  __        Left Ventricle  Left ventricular size is normal. Left ventricular wall thickness is normal.  Global and regional left ventricular function is normal with an EF of 60-65%.  No regional wall motion abnormalities are seen. The LV filling pattern is c/w  grade III diastolic dysfunction and increased left atrial pressure.     Right Ventricle  The right ventricle is normal size. Global right ventricular function is  normal.     Atria  Mild right atrial enlargement is present. Severe left atrial enlargement is  present.     Mitral Valve  Moderate mitral annular calcification is present. Bileaflet mitral valve  prolapse is present. Moderate to  severe mitral insufficiency is present. The  ERO is 30 mm2 and the RVol is 54 mL.        Aortic Valve  Mild aortic valve sclerosis is present.     Tricuspid Valve  The tricuspid valve is normal. Mild tricuspid insufficiency is present.  Pulmonary artery systolic pressure is normal. The right ventricular systolic  pressure is approximated at 23.2 mmHg plus the right atrial pressure.     Pulmonic Valve  The pulmonic valve is normal.     Vessels  The inferior vena cava was normal in size with preserved respiratory  variability. Ascending aorta 3.4 cm. Estimated mean right atrial pressure is  <3 mmHg.     Pericardium  No pericardial effusion is present.        Compared to Previous Study  This study was compared with the study from 10/14/2016 . The degree of MR is  quantified as described above.  _____________________________________________________________________________  __  MMode/2D Measurements & Calculations     IVSd: 0.97 cm  LVIDd: 5.1 cm  LVIDs: 3.0 cm  LVPWd: 0.99 cm  FS: 40.8 %  EDV(Teich): 123.9 ml  ESV(Teich): 35.6 ml  LV mass(C)d: 184.0 grams  asc Aorta Diam: 3.4 cm  LA Volume (BP): 119.0 ml  LA Volume Index (BP): 75.8 ml/m2        Doppler Measurements & Calculations  MV E max justyn: 127.4 cm/sec  MV A max justyn: 81.0 cm/sec  MV E/A: 1.6     MV max P.6 mmHg  MV mean PG: 3.2 mmHg  MV V2 VTI: 38.8 cm  MV dec time: 0.21 sec  TR max justyn: 241.1 cm/sec  TR max P.2 mmHg  Lateral E/e': 28.3  Medial E/e': 30.5          Please do not hesitate to contact me if you have any questions/concerns.     Sincerely,     Asim Altamirano MD

## 2017-04-06 NOTE — PROGRESS NOTES
Pt arrived in ECHO department for scheduled LAKEISHA.   Procedure explained, questions answered and consent signed. Discharge instructions discussed with patient and given written copy.  Pt's throat sprayed at 08:30am therefore pt will not be able to eat or drink until 2 hours after at 10:30am.  Informed pt of this time and encouraged to start with warm fluids and soft foods.    Given a total of 75 mcg IV fentanyl and 1.5 mg IV versed for conscious sedation.   LAKEISHA probe #12 attempted to be used for procedure, but unable to insert probe so procedure canceled. Will need to be rescheduled with anesthesia in the OR at later date.   Pt denied chest pain or throat pain after procedure and was monitored until awake. VSS throughout. Escorted out to front lobby by staff in w/c to meet pt's ride home, spouse.

## 2017-04-06 NOTE — PROGRESS NOTES
Patient returns for follow-up visit and was unable to have LAKEISHA today as had difficult posterior pharynx to pass as well as required large amounts of anesthesia and operators did not feel she was adequately sedated-- therefore suggest general anesthesia in OR.     She feels she is deteriorating over the last four months with increased fatigue (perhaps complemented by beta blocker) and decreased exercise tolerance.  Reverted to sinus rhythm.  She has no PND, orthopnea, ankle edema, palpitation, pre-syncope or syncope.    No current facility-administered medications for this visit.      No current outpatient prescriptions on file.     Facility-Administered Medications Ordered in Other Visits   Medication     0.9% sodium chloride infusion     midazolam (VERSED) injection 0.5-1 mg    Followed by     midazolam (VERSED) injection 0.5 mg     fentaNYL Citrate (PF) (SUBLIMAZE) injection 25-50 mcg    Followed by     fentaNYL Citrate (PF) (SUBLIMAZE) injection 25 mcg     naloxone (NARCAN) injection 0.1-0.4 mg     flumazenil (ROMAZICON) injection 0.2 mg   Results for WILL ESTRADA (MRN [id number]) as of 2017 18:00   Ref. Range 3/22/2017 09:22 3/24/2017 09:07 3/27/2017 10:42 3/30/2017 13:20 4/3/2017 09:51 2017 10:11   INR Latest Ref Range: 0.86 - 1.14  1.60 (H) 1.70 (H) 2.40 (H) 3.50 (H) 2.30 (H) 2.13 (H)   Name: WILL ESTRADA  MRN: [id number]  : 1935  Study Date: 03/15/2017 08:22 AM  Age: 81 yrs  Gender: Female  [place] [facility]  Reason For Study: , Palpitations  Ordering Physician: VICKI CORDERO  Referring Physician: VICKI CORDERO  Performed By: Iris Salcedo RDCS     BSA: 1.6 m2  Height: 63 in  Weight: 123 lb  BP: 118/76 mmHg  _____________________________________________________________________________  __        Procedure  Echocardiogram with two-dimensional, color and spectral Doppler performed.  Limited Echocardiogram with portions of two-dimensional, color and  spectral  Doppler performed.  _____________________________________________________________________________  __        Interpretation Summary  Global and regional left ventricular function is normal with an EF of 60-65%.  The LV filling pattern is c/w grade III diastolic dysfunction and increased  left atrial pressure.  Global right ventricular function is normal.  Severe left atrial enlargement.  Moderate mitral annular calcification. Bileaflet mitral valve prolapse is  present.  Moderate to severe mitral insufficiency. The ERO is 30 mm2 and the RVol is 54  mL.  Pulmonary artery systolic pressure is normal.  The inferior vena cava is normal in size with preserved respiratory  variability. Estimated mean right atrial pressure is <3 mmHg.  No pericardial effusion is present.  This study was compared with the study from 10/14/2016. The degree of MR is  quantified as described above.  _____________________________________________________________________________  __        Left Ventricle  Left ventricular size is normal. Left ventricular wall thickness is normal.  Global and regional left ventricular function is normal with an EF of 60-65%.  No regional wall motion abnormalities are seen. The LV filling pattern is c/w  grade III diastolic dysfunction and increased left atrial pressure.     Right Ventricle  The right ventricle is normal size. Global right ventricular function is  normal.     Atria  Mild right atrial enlargement is present. Severe left atrial enlargement is  present.     Mitral Valve  Moderate mitral annular calcification is present. Bileaflet mitral valve  prolapse is present. Moderate to severe mitral insufficiency is present. The  ERO is 30 mm2 and the RVol is 54 mL.        Aortic Valve  Mild aortic valve sclerosis is present.     Tricuspid Valve  The tricuspid valve is normal. Mild tricuspid insufficiency is present.  Pulmonary artery systolic pressure is normal. The right ventricular  systolic  pressure is approximated at 23.2 mmHg plus the right atrial pressure.     Pulmonic Valve  The pulmonic valve is normal.     Vessels  The inferior vena cava was normal in size with preserved respiratory  variability. Ascending aorta 3.4 cm. Estimated mean right atrial pressure is  <3 mmHg.     Pericardium  No pericardial effusion is present.        Compared to Previous Study  This study was compared with the study from 10/14/2016 . The degree of MR is  quantified as described above.  _____________________________________________________________________________  __  MMode/2D Measurements & Calculations     IVSd: 0.97 cm  LVIDd: 5.1 cm  LVIDs: 3.0 cm  LVPWd: 0.99 cm  FS: 40.8 %  EDV(Teich): 123.9 ml  ESV(Teich): 35.6 ml  LV mass(C)d: 184.0 grams  asc Aorta Diam: 3.4 cm  LA Volume (BP): 119.0 ml  LA Volume Index (BP): 75.8 ml/m2        Doppler Measurements & Calculations  MV E max justyn: 127.4 cm/sec  MV A max justyn: 81.0 cm/sec  MV E/A: 1.6     MV max P.6 mmHg  MV mean PG: 3.2 mmHg  MV V2 VTI: 38.8 cm  MV dec time: 0.21 sec  TR max justyn: 241.1 cm/sec  TR max P.2 mmHg  Lateral E/e': 28.3  Medial E/e': 30.5

## 2017-04-07 ENCOUNTER — CARE COORDINATION (OUTPATIENT)
Dept: CARDIOLOGY | Facility: CLINIC | Age: 82
End: 2017-04-07

## 2017-04-07 ENCOUNTER — TELEPHONE (OUTPATIENT)
Dept: CARDIOLOGY | Facility: CLINIC | Age: 82
End: 2017-04-07

## 2017-04-07 ENCOUNTER — ANTICOAGULATION THERAPY VISIT (OUTPATIENT)
Dept: ANTICOAGULATION | Facility: CLINIC | Age: 82
End: 2017-04-07

## 2017-04-07 DIAGNOSIS — I48.91 ATRIAL FIBRILLATION, UNSPECIFIED TYPE (H): ICD-10-CM

## 2017-04-07 DIAGNOSIS — I34.0 MYXOMATOUS MITRAL VALVE REGURGITATION: Primary | ICD-10-CM

## 2017-04-07 DIAGNOSIS — I50.22 CHRONIC SYSTOLIC HEART FAILURE (H): ICD-10-CM

## 2017-04-07 DIAGNOSIS — R06.00 DYSPNEA: ICD-10-CM

## 2017-04-07 DIAGNOSIS — Z79.01 LONG-TERM (CURRENT) USE OF ANTICOAGULANTS: ICD-10-CM

## 2017-04-07 RX ORDER — LIDOCAINE 40 MG/G
CREAM TOPICAL
Status: CANCELLED | OUTPATIENT
Start: 2017-04-07

## 2017-04-07 RX ORDER — SODIUM CHLORIDE 9 MG/ML
INJECTION, SOLUTION INTRAVENOUS CONTINUOUS
Status: CANCELLED | OUTPATIENT
Start: 2017-04-07

## 2017-04-07 NOTE — TELEPHONE ENCOUNTER
"Pt called wanting to find out when her LAKEISHA was rescheduled for, stating that \"Dr. Altamirano assured me they would call me to reschedule today.\" Pt had unsuccessful attempt of a LAKEISHA yesterday, and wants to schedule this and a f/u angiogram ASAP. Pt states it is ok to leave a voicemail.  "

## 2017-04-07 NOTE — PROGRESS NOTES
Per Dr. Altamirano - pt needs LAKEISHA under general anesthesia next week, & bilateral heart cath w/ coronaries in 2 weeks possibly (get it scheduled, but only tentative until LAKEISHA is done).  Pt will be called Monday w/ date/time & instructions when Latia G is back.

## 2017-04-07 NOTE — PROGRESS NOTES
ANTICOAGULATION FOLLOW-UP CLINIC VISIT    Patient Name:  Frannie Roa  Date:  4/7/2017  Contact Type:  Telephone    SUBJECTIVE:     Patient Findings     Positives No Problem Findings    Comments LAKEISHA scheduled on 4/6 was cancelled - to be re-scheduled & done in OR under general anesthesia, per MD notation.  Pt will alert us to the date for this when known.           OBJECTIVE    INR   Date Value Ref Range Status   04/06/2017 2.13 (H) 0.86 - 1.14 Final       ASSESSMENT / PLAN  INR assessment THER    Recheck INR In: 6 DAYS    INR Location Clinic      Anticoagulation Summary as of 4/7/2017     INR goal    Prior goal 2.0-3.0   Today's INR 2.13 (4/6/2017)   Maintenance plan No maintenance plan   Full instructions 4/7: 7.5 mg; 4/8: 7.5 mg; 4/9: 7.5 mg; 4/10: 7.5 mg; 4/11: 7.5 mg; 4/12: 7.5 mg   Plan last modified Radha Gould RN (3/17/2017)   Next INR check 4/13/2017   Priority INR   Target end date     Indications   Long-term (current) use of anticoagulants [Z79.01] [Z79.01]  Atrial fibrillation (H) [I48.91] [I48.91]         Anticoagulation Episode Summary     INR check location     Preferred lab     Send INR reminders to [facility]    Comments As of 3/20/17: +++++ INR goal range +++2.2-2.7++  3/21/17 Patient will have a finger stick INR done at the [facility] then [facility] will manage /rcj       Anticoagulation Care Providers     Provider Role Specialty Phone number    Asim Altamirano MD Responsible Cardiology [phone]            See the Encounter Report to view Anticoagulation Flowsheet and Dosing Calendar (Go to Encounters tab in chart review, and find the Anticoagulation Therapy Visit)    Spoke with Frannie - she calls to inquire about INR as she did not hear from us on 4/6.  On her own she took 7.5 mg coumadin on 4/6.  The updated INR clinic referral signed by MD is noted.    Lizz Melissa RN

## 2017-04-07 NOTE — MR AVS SNAPSHOT
Frannie JC Janina   4/7/2017   Anticoagulation Therapy Visit    Description:  81 year old female   Provider:  Lizz Melissa, RN   Department:  [facility]           INR as of 4/7/2017     Today's INR 2.13 (4/6/2017)      Anticoagulation Summary as of 4/7/2017     INR goal    Prior goal 2.0-3.0   Today's INR 2.13 (4/6/2017)   Full instructions 4/7: 7.5 mg; 4/8: 7.5 mg; 4/9: 7.5 mg; 4/10: 7.5 mg; 4/11: 7.5 mg; 4/12: 7.5 mg   Next INR check 4/13/2017    Indications   Long-term (current) use of anticoagulants [Z79.01] [Z79.01]  Atrial fibrillation (H) [I48.91] [I48.91]         April 2017 Details    Sun Mon Tue Wed Thu Fri Sat           1                 2               3               4               5               6               7      7.5 mg   See details      8      7.5 mg           9      7.5 mg         10      7.5 mg         11      7.5 mg         12      7.5 mg         13            14               15                 16               17               18               19               20               21               22                 23               24               25               26               27               28               29                 30                      Date Details   04/07 This INR check       Date of next INR:  4/13/2017         How to take your warfarin dose     To take:  7.5 mg Take 1.5 of the 5 mg tablets.

## 2017-04-10 ENCOUNTER — HOSPITAL ENCOUNTER (OUTPATIENT)
Facility: CLINIC | Age: 82
End: 2017-04-10
Attending: INTERNAL MEDICINE | Admitting: INTERNAL MEDICINE
Payer: MEDICARE

## 2017-04-11 ENCOUNTER — HOSPITAL ENCOUNTER (OUTPATIENT)
Facility: CLINIC | Age: 82
Discharge: HOME | End: 2017-04-11
Attending: INTERNAL MEDICINE | Admitting: INTERNAL MEDICINE
Payer: MEDICARE

## 2017-04-11 ENCOUNTER — ANESTHESIA EVENT (OUTPATIENT)
Dept: SURGERY | Facility: CLINIC | Age: 82
End: 2017-04-11
Payer: MEDICARE

## 2017-04-11 ENCOUNTER — SURGERY (OUTPATIENT)
Age: 82
End: 2017-04-11

## 2017-04-11 ENCOUNTER — ANESTHESIA (OUTPATIENT)
Dept: SURGERY | Facility: CLINIC | Age: 82
End: 2017-04-11
Payer: MEDICARE

## 2017-04-11 ENCOUNTER — HOSPITAL ENCOUNTER (OUTPATIENT)
Dept: CARDIOLOGY | Facility: CLINIC | Age: 82
End: 2017-04-11
Attending: INTERNAL MEDICINE | Admitting: INTERNAL MEDICINE
Payer: MEDICARE

## 2017-04-11 VITALS
WEIGHT: 121.69 LBS | RESPIRATION RATE: 14 BRPM | SYSTOLIC BLOOD PRESSURE: 127 MMHG | TEMPERATURE: 96.2 F | BODY MASS INDEX: 21.56 KG/M2 | HEIGHT: 63 IN | DIASTOLIC BLOOD PRESSURE: 72 MMHG | OXYGEN SATURATION: 100 %

## 2017-04-11 DIAGNOSIS — I34.0 MYXOMATOUS MITRAL VALVE REGURGITATION: ICD-10-CM

## 2017-04-11 PROCEDURE — 40000065 ZZH STATISTIC EKG NON-CHARGEABLE

## 2017-04-11 PROCEDURE — 40000170 ZZH STATISTIC PRE-PROCEDURE ASSESSMENT II

## 2017-04-11 PROCEDURE — 93320 DOPPLER ECHO COMPLETE: CPT

## 2017-04-11 PROCEDURE — 93005 ELECTROCARDIOGRAM TRACING: CPT

## 2017-04-11 PROCEDURE — 71000027 ZZH RECOVERY PHASE 2 EACH 15 MINS

## 2017-04-11 PROCEDURE — 93320 DOPPLER ECHO COMPLETE: CPT | Mod: 26 | Performed by: INTERNAL MEDICINE

## 2017-04-11 PROCEDURE — 37000009 ZZH ANESTHESIA TECHNICAL FEE, EACH ADDTL 15 MIN

## 2017-04-11 PROCEDURE — 93312 ECHO TRANSESOPHAGEAL: CPT | Mod: 26 | Performed by: INTERNAL MEDICINE

## 2017-04-11 PROCEDURE — 25000125 ZZHC RX 250: Performed by: NURSE ANESTHETIST, CERTIFIED REGISTERED

## 2017-04-11 PROCEDURE — 93325 DOPPLER ECHO COLOR FLOW MAPG: CPT | Mod: 26 | Performed by: INTERNAL MEDICINE

## 2017-04-11 PROCEDURE — 25000132 ZZH RX MED GY IP 250 OP 250 PS 637: Mod: GY | Performed by: NURSE ANESTHETIST, CERTIFIED REGISTERED

## 2017-04-11 PROCEDURE — 37000008 ZZH ANESTHESIA TECHNICAL FEE, 1ST 30 MIN

## 2017-04-11 PROCEDURE — 25800025 ZZH RX 258: Performed by: NURSE ANESTHETIST, CERTIFIED REGISTERED

## 2017-04-11 PROCEDURE — 93010 ELECTROCARDIOGRAM REPORT: CPT | Performed by: INTERNAL MEDICINE

## 2017-04-11 RX ORDER — SODIUM CHLORIDE, SODIUM LACTATE, POTASSIUM CHLORIDE, CALCIUM CHLORIDE 600; 310; 30; 20 MG/100ML; MG/100ML; MG/100ML; MG/100ML
INJECTION, SOLUTION INTRAVENOUS CONTINUOUS PRN
Status: DISCONTINUED | OUTPATIENT
Start: 2017-04-11 | End: 2017-04-11

## 2017-04-11 RX ORDER — DEXAMETHASONE SODIUM PHOSPHATE 4 MG/ML
4 INJECTION, SOLUTION INTRA-ARTICULAR; INTRALESIONAL; INTRAMUSCULAR; INTRAVENOUS; SOFT TISSUE EVERY 10 MIN PRN
Status: DISCONTINUED | OUTPATIENT
Start: 2017-04-11 | End: 2017-04-11 | Stop reason: HOSPADM

## 2017-04-11 RX ORDER — SODIUM CHLORIDE, SODIUM LACTATE, POTASSIUM CHLORIDE, CALCIUM CHLORIDE 600; 310; 30; 20 MG/100ML; MG/100ML; MG/100ML; MG/100ML
INJECTION, SOLUTION INTRAVENOUS CONTINUOUS
Status: DISCONTINUED | OUTPATIENT
Start: 2017-04-11 | End: 2017-04-11 | Stop reason: HOSPADM

## 2017-04-11 RX ORDER — LIDOCAINE HYDROCHLORIDE 20 MG/ML
INJECTION, SOLUTION INFILTRATION; PERINEURAL PRN
Status: DISCONTINUED | OUTPATIENT
Start: 2017-04-11 | End: 2017-04-11

## 2017-04-11 RX ORDER — SODIUM CHLORIDE 9 MG/ML
INJECTION, SOLUTION INTRAVENOUS CONTINUOUS
Status: DISCONTINUED | OUTPATIENT
Start: 2017-04-11 | End: 2017-04-11 | Stop reason: HOSPADM

## 2017-04-11 RX ORDER — ONDANSETRON 4 MG/1
4 TABLET, ORALLY DISINTEGRATING ORAL EVERY 30 MIN PRN
Status: DISCONTINUED | OUTPATIENT
Start: 2017-04-11 | End: 2017-04-11 | Stop reason: HOSPADM

## 2017-04-11 RX ORDER — MEPERIDINE HYDROCHLORIDE 25 MG/ML
12.5 INJECTION INTRAMUSCULAR; INTRAVENOUS; SUBCUTANEOUS
Status: DISCONTINUED | OUTPATIENT
Start: 2017-04-11 | End: 2017-04-11 | Stop reason: HOSPADM

## 2017-04-11 RX ORDER — NALOXONE HYDROCHLORIDE 0.4 MG/ML
.1-.4 INJECTION, SOLUTION INTRAMUSCULAR; INTRAVENOUS; SUBCUTANEOUS
Status: DISCONTINUED | OUTPATIENT
Start: 2017-04-11 | End: 2017-04-11 | Stop reason: HOSPADM

## 2017-04-11 RX ORDER — FENTANYL CITRATE 50 UG/ML
25-50 INJECTION, SOLUTION INTRAMUSCULAR; INTRAVENOUS
Status: DISCONTINUED | OUTPATIENT
Start: 2017-04-11 | End: 2017-04-11 | Stop reason: HOSPADM

## 2017-04-11 RX ORDER — PROPOFOL 10 MG/ML
INJECTION, EMULSION INTRAVENOUS CONTINUOUS PRN
Status: DISCONTINUED | OUTPATIENT
Start: 2017-04-11 | End: 2017-04-11

## 2017-04-11 RX ORDER — LIDOCAINE 40 MG/G
CREAM TOPICAL
Status: DISCONTINUED | OUTPATIENT
Start: 2017-04-11 | End: 2017-04-11 | Stop reason: HOSPADM

## 2017-04-11 RX ORDER — PROPOFOL 10 MG/ML
INJECTION, EMULSION INTRAVENOUS PRN
Status: DISCONTINUED | OUTPATIENT
Start: 2017-04-11 | End: 2017-04-11

## 2017-04-11 RX ORDER — ONDANSETRON 2 MG/ML
4 INJECTION INTRAMUSCULAR; INTRAVENOUS EVERY 30 MIN PRN
Status: DISCONTINUED | OUTPATIENT
Start: 2017-04-11 | End: 2017-04-11 | Stop reason: HOSPADM

## 2017-04-11 RX ADMIN — LIDOCAINE HYDROCHLORIDE 40 MG: 20 INJECTION, SOLUTION INFILTRATION; PERINEURAL at 11:35

## 2017-04-11 RX ADMIN — BENZOCAINE 1 APPLICATOR: 220 SPRAY, METERED PERIODONTAL at 12:00

## 2017-04-11 RX ADMIN — LIDOCAINE HYDROCHLORIDE 5 ML: 20 SOLUTION ORAL; TOPICAL at 11:29

## 2017-04-11 RX ADMIN — SODIUM CHLORIDE, POTASSIUM CHLORIDE, SODIUM LACTATE AND CALCIUM CHLORIDE: 600; 310; 30; 20 INJECTION, SOLUTION INTRAVENOUS at 11:29

## 2017-04-11 RX ADMIN — PROPOFOL 20 MG: 10 INJECTION, EMULSION INTRAVENOUS at 11:35

## 2017-04-11 RX ADMIN — PROPOFOL 50 MCG/KG/MIN: 10 INJECTION, EMULSION INTRAVENOUS at 11:35

## 2017-04-11 ASSESSMENT — ENCOUNTER SYMPTOMS: DYSRHYTHMIAS: 1

## 2017-04-11 NOTE — ANESTHESIA CARE TRANSFER NOTE
Patient: Frannie Roa    Procedure(s):  Anesthesia Off site Transesophageal Echocardiogram     Diagnosis: Mitral Regurge   Diagnosis Additional Information: No value filed.    Anesthesia Type:   ETT, MAC     Note:  Airway :Room Air  Patient transferred to:PACU  Comments: VSS on arrival, report to RN.      Vitals: (Last set prior to Anesthesia Care Transfer)    CRNA VITALS  4/11/2017 1209 - 4/11/2017 1239      4/11/2017             Resp Rate (set): 10                Electronically Signed By: ELMO Tucker CRNA  April 11, 2017  12:39 PM

## 2017-04-11 NOTE — IP AVS SNAPSHOT
Same Day Surgery [address] [address]    Phone:  [phone]                                       After Visit Summary   4/11/2017    Frannie Roa    MRN: [id number]           After Visit Summary Signature Page     I have received my discharge instructions, and my questions have been answered. I have discussed any challenges I see with this plan with the nurse or doctor.    ..........................................................................................................................................  Patient/Patient Representative Signature      ..........................................................................................................................................  Patient Representative Print Name and Relationship to Patient    ..................................................               ................................................  Date                                            Time    ..........................................................................................................................................  Reviewed by Signature/Title    ...................................................              ..............................................  Date                                                            Time

## 2017-04-11 NOTE — DISCHARGE INSTRUCTIONS
[facility]  Same-Day Surgery   Adult Discharge Orders & Instructions     For 24 hours after surgery    1. Get plenty of rest.  A responsible adult must stay with you for at least 24 hours after you leave the hospital.   2. Do not drive or use heavy equipment.  If you have weakness or tingling, don't drive or use heavy equipment until this feeling goes away.  3. Do not drink alcohol.  4. Avoid strenuous or risky activities.  Ask for help when climbing stairs.   5. You may feel lightheaded.  IF so, sit for a few minutes before standing.  Have someone help you get up.   6. If you have nausea (feel sick to your stomach): Drink only clear liquids such as apple juice, ginger ale, broth or 7-Up.  Rest may also help.  Be sure to drink enough fluids.  Move to a regular diet as you feel able.  7. You may have a slight fever. Call the doctor if your fever is over 100 F (37.7 C) (taken under the tongue) or lasts longer than 24 hours.  8. You may have a dry mouth, a sore throat, muscle aches or trouble sleeping.  These should go away after 24 hours.  9. Do not make important or legal decisions.   Call your doctor for any of the followin.  Signs of infection (fever, growing tenderness at the surgery site, a large amount of drainage or bleeding, severe pain, foul-smelling drainage, redness, swelling).    2. It has been over 8 to 10 hours since surgery and you are still not able to urinate (pass water).    3.  Headache for over 24 hours.      To contact a doctor, call your cardiologist Dr. Altamirano [phone] [OFFICE]                   [phone] [CLINIC]   or:    x   [phone] and ask for the resident on call for   Cardiology (answered 24 hours a day)  x   Emergency Department:    [facility]: [phone]       (TTY for hearing impaired: [phone])

## 2017-04-11 NOTE — ANESTHESIA PREPROCEDURE EVALUATION
Anesthesia Evaluation     . Pt has had prior anesthetic.     No history of anesthetic complications          ROS/MED HX    ENT/Pulmonary:       Neurologic:       Cardiovascular: Comment: Patient presenting with tachycardia (HR 160s)    (+) ----. : . . . :. dysrhythmias a-fib, valvular problems/murmurs .       METS/Exercise Tolerance:  >4 METS   Hematologic:         Musculoskeletal:         GI/Hepatic:         Renal/Genitourinary:         Endo:         Psychiatric:         Infectious Disease:         Malignancy:         Other:                     Physical Exam  Normal systems: pulmonary and dental    Airway   Mallampati: II  TM distance: >3 FB  Neck ROM: full    Dental     Cardiovascular   Rhythm and rate: irregular and normal      Pulmonary                     Anesthesia Plan      History & Physical Review  History and physical reviewed and following examination; no interval change.    ASA Status:  3 .    NPO Status:  > 8 hours    Plan for General, ETT and MAC with Intravenous induction. Maintenance will be Balanced.  Reason for MAC:  Deep or markedly invasive procedure (G8)  PONV prophylaxis:  Ondansetron (or other 5HT-3) and Other (See comment)       Postoperative Care  Postoperative pain management:  IV analgesics and Multi-modal analgesia.      Consents  Anesthetic plan, risks, benefits and alternatives discussed with:  Patient..                          .

## 2017-04-11 NOTE — ANESTHESIA POSTPROCEDURE EVALUATION
Patient: Frannie Roa    Procedure(s):  Anesthesia Off site Transesophageal Echocardiogram     Diagnosis:Mitral Regurge   Diagnosis Additional Information: No value filed.    Anesthesia Type:  ETT, MAC    Note:  Anesthesia Post Evaluation    Patient location during evaluation: Bedside  Patient participation: Able to fully participate in evaluation  Level of consciousness: awake and alert  Pain management: adequate  Airway patency: patent  Cardiovascular status: acceptable  Respiratory status: acceptable  Hydration status: acceptable  PONV: none     Anesthetic complications: None          Last vitals:  Vitals:    04/11/17 0917   BP: 116/75   Resp: 15   Temp: 36.5  C (97.7  F)   SpO2: 99%         Electronically Signed By: Mirtha Simeon MD  April 11, 2017  1:07 PM

## 2017-04-11 NOTE — IP AVS SNAPSHOT
MRN:[id number]                      After Visit Summary   4/11/2017    Frannie Roa    MRN: [id number]           Thank you!     Thank you for choosing [facility] for your care. Our goal is always to provide you with excellent care. Hearing back from our patients is one way we can continue to improve our services. Please take a few minutes to complete the written survey that you may receive in the mail after you visit with us. Thank you!        Patient Information     Date Of Birth          1935        About your hospital stay     You were admitted on:  April 11, 2017 You last received care in the:  Same Day Surgery [facility]    You were discharged on:  April 11, 2017       Who to Call     For medical emergencies, please call 911.  For non-urgent questions about your medical care, please call your primary care provider or clinic, [phone]  For questions related to your surgery, please call your surgery clinic        Attending Provider     Provider Specialty    Asim Altamirano MD Cardiology       Primary Care Provider Office Phone # Fax #    Romelia Parker [phone]       [facility] [address]   [facility] [address]        Your next 10 appointments already scheduled     Apr 12, 2017  9:30 AM CDT   LAB with CR LAB   [facility]    [address] [place] [address] [phone]           Patient must bring picture ID.  Patient should be prepared to give a urine specimen  Please do not eat 10-12 hours before your appointment if you are coming in fasting for labs on lipids, cholesterol, or glucose (sugar).  Pregnant women should follow their Care Team instructions. Water with medications is okay. Do not drink coffee or other fluids.   If you have concerns about taking  your medications, please ask at office or if scheduling via [employer], send a message by  clicking on Secure Messaging, Message Your Care Team.            2017 10:30 AM CDT   Procedure 1.5 hr with U2A ROOM 15   [address] [facility] ([facility])    [address] [address]               2017 12:00 PM CDT   Cath 90 Minute with UUHCVR5   [facility]    [address] [address]   [phone]              Further instructions from your care team       [facility]   Adult Discharge Orders & Instructions     For 24 hours after surgery    1. Get plenty of rest.  A responsible adult must stay with you for at least 24 hours after you leave the hospital.   2. Do not drive or use heavy equipment.  If you have weakness or tingling, don't drive or use heavy equipment until this feeling goes away.  3. Do not drink alcohol.  4. Avoid strenuous or risky activities.  Ask for help when climbing stairs.   5. You may feel lightheaded.  IF so, sit for a few minutes before standing.  Have someone help you get up.   6. If you have nausea (feel sick to your stomach): Drink only clear liquids such as apple juice, ginger ale, broth or 7-Up.  Rest may also help.  Be sure to drink enough fluids.  Move to a regular diet as you feel able.  7. You may have a slight fever. Call the doctor if your fever is over 100 F (37.7 C) (taken under the tongue) or lasts longer than 24 hours.  8. You may have a dry mouth, a sore throat, muscle aches or trouble sleeping.  These should go away after 24 hours.  9. Do not make important or legal decisions.   Call your doctor for any of the followin.  Signs of infection (fever, growing tenderness at the surgery site, a large amount of drainage or bleeding, severe pain, foul-smelling drainage, redness, swelling).    2. It has been over 8 to 10 hours since surgery and you are still  "not able to urinate (pass water).    3.  Headache for over 24 hours.      To contact a doctor, call your cardiologist Dr. Altamirano [phone] [OFFICE]                   [phone] [CLINIC]   or:    x   [phone] and ask for the resident on call for   Cardiology (answered 24 hours a day)  x   Emergency Department:    [facility]: [phone]       (TTY for hearing impaired: [phone])              Pending Results     Date and Time Order Name Status Description    2017 1033 Transesophageal Echocardiogram In process     2017 0918 EKG 12-lead, tracing only Preliminary             Admission Information     Date & Time Provider Department Dept. Phone    2017 Asim Altamirano MD Same Day Surgery [facility] [phone]      Your Vitals Were     Blood Pressure Temperature Respirations Height Weight Pulse Oximetry    116/75 (Cuff Size: Adult Small) 97.7  F (36.5  C) (Oral) 15 1.6 m (5' 3\") 55.2 kg (121 lb 11.1 oz) 99%    BMI (Body Mass Index)                   21.56 kg/m2           [employer] Information     [employer] lets you send messages to your doctor, view your test results, renew your prescriptions, schedule appointments and more. To sign up, go to [email or web address] . Click on \"Log in\" on the left side of the screen, which will take you to the Welcome page. Then click on \"Sign up Now\" on the right side of the page.     You will be asked to enter the access code listed below, as well as some personal information. Please follow the directions to create your username and password.     Your access code is: [email or web address]  Expires: 2017  7:53 PM     Your access code will  in 90 days. If you need help or a new code, please call your [place] clinic or [phone].        Care EveryWhere ID     This is your Care EveryWhere ID. This could be used by other organizations to access your [place] medical records  [id number]           Review of your medicines      UNREVIEWED " medicines. Ask your doctor about these medicines        Dose / Directions    dorzolamide-timolol 2-0.5 % ophthalmic solution   Commonly known as:  COSOPT        Dose:  1 drop   1 drop 2 times daily.   Refills:  0       FLUZONE HIGH-DOSE 0.5 ML injection   Generic drug:  influenza Vac Split High-Dose        ADM 0.5ML IM UTD   Refills:  0       metoprolol 25 MG tablet   Commonly known as:  LOPRESSOR   Used for:  Myxomatous mitral valve regurgitation, Chronic systolic congestive heart failure (H), Paroxysmal atrial fibrillation (H)        Dose:  12.5 mg   Take 0.5 tablets (12.5 mg) by mouth 2 times daily   Quantity:  90 tablet   Refills:  3       travoprost Z (benzalkonium) 0.004 % ophthalmic solution   Commonly known as:  TRAVATAN Z        Dose:  1 drop   1 drop every evening.   Refills:  0       warfarin 5 MG tablet   Commonly known as:  COUMADIN   Used for:  Myxomatous mitral valve regurgitation, Chronic systolic congestive heart failure (H), Paroxysmal atrial fibrillation (H)        Dose:  5 mg   Take 1 tablet (5 mg) by mouth daily   Quantity:  90 tablet   Refills:  3       zinc sulfate 220 MG capsule   Commonly known as:  ZINCATE        Dose:  220 mg   Take 220 mg by mouth 2 times daily   Refills:  0                Protect others around you: Learn how to safely use, store and throw away your medicines at www.disposemymeds.org.             Medication List: This is a list of all your medications and when to take them. Check marks below indicate your daily home schedule. Keep this list as a reference.      Medications           Morning Afternoon Evening Bedtime As Needed    dorzolamide-timolol 2-0.5 % ophthalmic solution   Commonly known as:  COSOPT   1 drop 2 times daily.                                FLUZONE HIGH-DOSE 0.5 ML injection   ADM 0.5ML IM UTD   Generic drug:  influenza Vac Split High-Dose                                metoprolol 25 MG tablet   Commonly known as:  LOPRESSOR   Take 0.5 tablets (12.5 mg)  by mouth 2 times daily                                travoprost Z (benzalkonium) 0.004 % ophthalmic solution   Commonly known as:  TRAVATAN Z   1 drop every evening.                                warfarin 5 MG tablet   Commonly known as:  COUMADIN   Take 1 tablet (5 mg) by mouth daily                                zinc sulfate 220 MG capsule   Commonly known as:  ZINCATE   Take 220 mg by mouth 2 times daily

## 2017-04-12 ENCOUNTER — ANTICOAGULATION THERAPY VISIT (OUTPATIENT)
Dept: ANTICOAGULATION | Facility: CLINIC | Age: 82
End: 2017-04-12

## 2017-04-12 DIAGNOSIS — I48.0 PAROXYSMAL ATRIAL FIBRILLATION (H): ICD-10-CM

## 2017-04-12 DIAGNOSIS — Z79.01 LONG-TERM (CURRENT) USE OF ANTICOAGULANTS: ICD-10-CM

## 2017-04-12 DIAGNOSIS — I48.91 ATRIAL FIBRILLATION, UNSPECIFIED TYPE (H): ICD-10-CM

## 2017-04-12 LAB
INR PPP: 2.7 (ref 0.86–1.14)
INTERPRETATION ECG - MUSE: NORMAL

## 2017-04-12 PROCEDURE — 85610 PROTHROMBIN TIME: CPT | Performed by: FAMILY MEDICINE

## 2017-04-12 PROCEDURE — 36416 COLLJ CAPILLARY BLOOD SPEC: CPT | Performed by: FAMILY MEDICINE

## 2017-04-12 NOTE — MR AVS SNAPSHOT
Frannie JC Janina   4/12/2017   Anticoagulation Therapy Visit    Description:  81 year old female   Provider:  Radha Gould, RN   Department:  [facility]           INR as of 4/12/2017     Today's INR 2.70      Anticoagulation Summary as of 4/12/2017     INR goal    Prior goal 2.0-3.0   Today's INR 2.70   Full instructions 4/12: 5 mg; Otherwise 7.5 mg every day   Next INR check 4/14/2017    Indications   Long-term (current) use of anticoagulants [Z79.01] [Z79.01]  Atrial fibrillation (H) [I48.91] [I48.91]         April 2017 Details    Sun Mon Tue Wed Thu Fri Sat           1                 2               3               4               5               6               7               8                 9               10               11               12      5 mg   See details      13      7.5 mg         14            15                 16               17               18               19               20               21               22                 23               24               25               26               27               28               29                 30                      Date Details   04/12 This INR check       Date of next INR:  4/14/2017         How to take your warfarin dose     To take:  5 mg Take 1 of the 5 mg tablets.    To take:  7.5 mg Take 1.5 of the 5 mg tablets.

## 2017-04-12 NOTE — PROGRESS NOTES
ANTICOAGULATION FOLLOW-UP CLINIC VISIT    Patient Name:  Frannie Roa  Date:  4/12/2017  Contact Type:  Telephone    SUBJECTIVE:     Patient Findings     Positives No Problem Findings           OBJECTIVE    INR   Date Value Ref Range Status   04/12/2017 2.70 (H) 0.86 - 1.14 Final     Comment:     This test is intended for monitoring Coumadin therapy.  Results are not   accurate   in patients with prolonged INR due to factor deficiency.         ASSESSMENT / PLAN  INR assessment THER    Recheck INR In: 2 DAYS    INR Location Clinic      Anticoagulation Summary as of 4/12/2017     INR goal    Prior goal 2.0-3.0   Today's INR 2.70   Maintenance plan 7.5 mg (5 mg x 1.5) every day   Full instructions 4/12: 5 mg; Otherwise 7.5 mg every day   Weekly total 52.5 mg   Plan last modified Radha Gould RN (4/12/2017)   Next INR check 4/14/2017   Priority INR   Target end date     Indications   Long-term (current) use of anticoagulants [Z79.01] [Z79.01]  Atrial fibrillation (H) [I48.91] [I48.91]         Anticoagulation Episode Summary     INR check location     Preferred lab     Send INR reminders to [facility]    Comments As of 3/20/17: +++++ INR goal range +++2.2-2.7++  3/21/17 Patient will have a finger stick INR done at the [facility] then [facility] will manage /rcj       Anticoagulation Care Providers     Provider Role Specialty Phone number    Asim Altamirano MD Responsible Cardiology [phone]            See the Encounter Report to view Anticoagulation Flowsheet and Dosing Calendar (Go to Encounters tab in chart review, and find the Anticoagulation Therapy Visit)    Spoke with Frannie. She was concerned that the INR was at the top of her range and would like to take 5 mg today. She will have a LAKEISHA and right heart cath on Monday, 4/17 in the OR. Contacting cardiology to determine if any alteration to anticoagulation plan should be made.    Radha Gould RN    4/13/17 Reviewed chart  and noted that Dr. Altamirano has recommended holding warfarin 3 days prior to angiogram. Spoke with Frannie; she reports that she has been instructed by Michelle Whalen to hold the warfarin. Michelle also advised a warfarin dose of 5 mg tonight. Will follow up on Monday. -Silvia Gould RN

## 2017-04-13 ENCOUNTER — CARE COORDINATION (OUTPATIENT)
Dept: CARDIOLOGY | Facility: CLINIC | Age: 82
End: 2017-04-13

## 2017-04-13 NOTE — PROGRESS NOTES
"Received multiple messages from triage nurses that patient has questions regarding the f/u plan since she has had the LAKEISHA to look at her mitral valve.  It appears she has an angiogram scheduled on Monday, 4/17 re: further evaluation of her mitral valve.  She is very anxious and wants information on the f/u plan.  We discussed that she should first proceed with the angiogram as scheduled.  Dr. Altamirano will review the results of the LAKEISHA and angiogram and with her, make the decision on whether she may need surgery to fix the valve. She is aware of this and feels she \"may need surgery right away as she feels more symptoms\".  Encouraged her to proceed first with the angiogram and if that is the case, she will meet with a surgeon to discuss the surgery and answer all her questions at that time.  "

## 2017-04-13 NOTE — PROGRESS NOTES
Pt called w/ post LAKEISHA plan after Dr. Altamirano reviewed.  Pt still needs angiogram as scheduled to better look at valve & what is going on, hold warfarin for 3d prior.  Pt in agreement to plan, f/u appt made for 4/25.

## 2017-04-17 ENCOUNTER — APPOINTMENT (OUTPATIENT)
Dept: MEDSURG UNIT | Facility: CLINIC | Age: 82
End: 2017-04-17
Attending: INTERNAL MEDICINE
Payer: MEDICARE

## 2017-04-17 ENCOUNTER — ANTICOAGULATION THERAPY VISIT (OUTPATIENT)
Dept: ANTICOAGULATION | Facility: CLINIC | Age: 82
End: 2017-04-17

## 2017-04-17 ENCOUNTER — HOSPITAL ENCOUNTER (OUTPATIENT)
Facility: CLINIC | Age: 82
Discharge: HOME | End: 2017-04-17
Attending: INTERNAL MEDICINE | Admitting: INTERNAL MEDICINE
Payer: MEDICARE

## 2017-04-17 ENCOUNTER — APPOINTMENT (OUTPATIENT)
Dept: CARDIOLOGY | Facility: CLINIC | Age: 82
End: 2017-04-17
Attending: INTERNAL MEDICINE
Payer: MEDICARE

## 2017-04-17 VITALS
DIASTOLIC BLOOD PRESSURE: 93 MMHG | OXYGEN SATURATION: 96 % | RESPIRATION RATE: 14 BRPM | SYSTOLIC BLOOD PRESSURE: 118 MMHG | TEMPERATURE: 98.3 F

## 2017-04-17 DIAGNOSIS — Z79.01 LONG-TERM (CURRENT) USE OF ANTICOAGULANTS: ICD-10-CM

## 2017-04-17 DIAGNOSIS — I48.91 ATRIAL FIBRILLATION, UNSPECIFIED TYPE (H): ICD-10-CM

## 2017-04-17 DIAGNOSIS — Z98.890 STATUS POST CORONARY ANGIOGRAM: Primary | ICD-10-CM

## 2017-04-17 DIAGNOSIS — I34.0 MYXOMATOUS MITRAL VALVE REGURGITATION: ICD-10-CM

## 2017-04-17 DIAGNOSIS — I50.22 CHRONIC SYSTOLIC HEART FAILURE (H): ICD-10-CM

## 2017-04-17 DIAGNOSIS — R06.00 DYSPNEA: ICD-10-CM

## 2017-04-17 LAB
ANION GAP SERPL CALCULATED.3IONS-SCNC: 7 MMOL/L (ref 3–14)
BUN SERPL-MCNC: 21 MG/DL (ref 7–30)
CALCIUM SERPL-MCNC: 8.3 MG/DL (ref 8.5–10.1)
CHLORIDE SERPL-SCNC: 107 MMOL/L (ref 94–109)
CO2 SERPL-SCNC: 25 MMOL/L (ref 20–32)
CREAT SERPL-MCNC: 0.57 MG/DL (ref 0.52–1.04)
ERYTHROCYTE [DISTWIDTH] IN BLOOD BY AUTOMATED COUNT: 14 % (ref 10–15)
GFR SERPL CREATININE-BSD FRML MDRD: ABNORMAL ML/MIN/1.7M2
GLUCOSE SERPL-MCNC: 91 MG/DL (ref 70–99)
HCT VFR BLD AUTO: 36.4 % (ref 35–47)
HGB BLD-MCNC: 11.5 G/DL (ref 11.7–15.7)
INR BLD: 1.1 (ref 0.86–1.14)
MCH RBC QN AUTO: 28.9 PG (ref 26.5–33)
MCHC RBC AUTO-ENTMCNC: 31.6 G/DL (ref 31.5–36.5)
MCV RBC AUTO: 92 FL (ref 78–100)
PLATELET # BLD AUTO: 300 10E9/L (ref 150–450)
POTASSIUM SERPL-SCNC: 4.2 MMOL/L (ref 3.4–5.3)
RBC # BLD AUTO: 3.98 10E12/L (ref 3.8–5.2)
SODIUM SERPL-SCNC: 139 MMOL/L (ref 133–144)
WBC # BLD AUTO: 7 10E9/L (ref 4–11)

## 2017-04-17 PROCEDURE — 99152 MOD SED SAME PHYS/QHP 5/>YRS: CPT

## 2017-04-17 PROCEDURE — 80048 BASIC METABOLIC PNL TOTAL CA: CPT | Performed by: INTERNAL MEDICINE

## 2017-04-17 PROCEDURE — C1769 GUIDE WIRE: HCPCS

## 2017-04-17 PROCEDURE — 25000128 H RX IP 250 OP 636

## 2017-04-17 PROCEDURE — 27210946 ZZH KIT HC TOTES DISP CR8

## 2017-04-17 PROCEDURE — 27210787 ZZH MANIFOLD CR2

## 2017-04-17 PROCEDURE — 99153 MOD SED SAME PHYS/QHP EA: CPT

## 2017-04-17 PROCEDURE — C1894 INTRO/SHEATH, NON-LASER: HCPCS

## 2017-04-17 PROCEDURE — 85027 COMPLETE CBC AUTOMATED: CPT | Performed by: INTERNAL MEDICINE

## 2017-04-17 PROCEDURE — 27211089 ZZH KIT ACIST INJECTOR CR3

## 2017-04-17 PROCEDURE — 4A023N8 MEASUREMENT OF CARDIAC SAMPLING AND PRESSURE, BILATERAL, PERCUTANEOUS APPROACH: ICD-10-PCS | Performed by: INTERNAL MEDICINE

## 2017-04-17 PROCEDURE — B2111ZZ FLUOROSCOPY OF MULTIPLE CORONARY ARTERIES USING LOW OSMOLAR CONTRAST: ICD-10-PCS | Performed by: INTERNAL MEDICINE

## 2017-04-17 PROCEDURE — 40000172 ZZH STATISTIC PROCEDURE PREP ONLY

## 2017-04-17 PROCEDURE — 93456 R HRT CORONARY ARTERY ANGIO: CPT | Mod: 26 | Performed by: INTERNAL MEDICINE

## 2017-04-17 PROCEDURE — 25000128 H RX IP 250 OP 636: Performed by: INTERNAL MEDICINE

## 2017-04-17 PROCEDURE — 40000141 ZZH STATISTIC PERIPHERAL IV START W/O US GUIDANCE

## 2017-04-17 PROCEDURE — 27210742 ZZH CATH CR1

## 2017-04-17 PROCEDURE — A9270 NON-COVERED ITEM OR SERVICE: HCPCS | Mod: GY | Performed by: INTERNAL MEDICINE

## 2017-04-17 PROCEDURE — 27210807 ZZH SHEATH CR6

## 2017-04-17 PROCEDURE — 93460 R&L HRT ART/VENTRICLE ANGIO: CPT

## 2017-04-17 PROCEDURE — 25000132 ZZH RX MED GY IP 250 OP 250 PS 637: Mod: GY | Performed by: INTERNAL MEDICINE

## 2017-04-17 PROCEDURE — 85610 PROTHROMBIN TIME: CPT | Mod: QW

## 2017-04-17 PROCEDURE — 25000125 ZZHC RX 250: Performed by: INTERNAL MEDICINE

## 2017-04-17 RX ORDER — FUROSEMIDE 10 MG/ML
20-100 INJECTION INTRAMUSCULAR; INTRAVENOUS
Status: DISCONTINUED | OUTPATIENT
Start: 2017-04-17 | End: 2017-04-17 | Stop reason: HOSPADM

## 2017-04-17 RX ORDER — FLUMAZENIL 0.1 MG/ML
0.2 INJECTION, SOLUTION INTRAVENOUS
Status: DISCONTINUED | OUTPATIENT
Start: 2017-04-17 | End: 2017-04-17 | Stop reason: HOSPADM

## 2017-04-17 RX ORDER — NITROGLYCERIN 5 MG/ML
100-500 VIAL (ML) INTRAVENOUS
Status: DISCONTINUED | OUTPATIENT
Start: 2017-04-17 | End: 2017-04-17 | Stop reason: HOSPADM

## 2017-04-17 RX ORDER — FENTANYL CITRATE 50 UG/ML
25-50 INJECTION, SOLUTION INTRAMUSCULAR; INTRAVENOUS
Status: DISCONTINUED | OUTPATIENT
Start: 2017-04-17 | End: 2017-04-17 | Stop reason: HOSPADM

## 2017-04-17 RX ORDER — NIFEDIPINE 10 MG/1
10 CAPSULE ORAL
Status: DISCONTINUED | OUTPATIENT
Start: 2017-04-17 | End: 2017-04-17 | Stop reason: HOSPADM

## 2017-04-17 RX ORDER — NALOXONE HYDROCHLORIDE 0.4 MG/ML
.1-.4 INJECTION, SOLUTION INTRAMUSCULAR; INTRAVENOUS; SUBCUTANEOUS
Status: DISCONTINUED | OUTPATIENT
Start: 2017-04-17 | End: 2017-04-17 | Stop reason: HOSPADM

## 2017-04-17 RX ORDER — SODIUM CHLORIDE 9 MG/ML
INJECTION, SOLUTION INTRAVENOUS CONTINUOUS
Status: DISCONTINUED | OUTPATIENT
Start: 2017-04-17 | End: 2017-04-17 | Stop reason: HOSPADM

## 2017-04-17 RX ORDER — IOPAMIDOL 755 MG/ML
44 INJECTION, SOLUTION INTRAVASCULAR ONCE
Status: COMPLETED | OUTPATIENT
Start: 2017-04-17 | End: 2017-04-17

## 2017-04-17 RX ORDER — PROTAMINE SULFATE 10 MG/ML
25-100 INJECTION, SOLUTION INTRAVENOUS EVERY 5 MIN PRN
Status: DISCONTINUED | OUTPATIENT
Start: 2017-04-17 | End: 2017-04-17 | Stop reason: HOSPADM

## 2017-04-17 RX ORDER — VERAPAMIL HYDROCHLORIDE 2.5 MG/ML
1-5 INJECTION, SOLUTION INTRAVENOUS
Status: DISCONTINUED | OUTPATIENT
Start: 2017-04-17 | End: 2017-04-17 | Stop reason: HOSPADM

## 2017-04-17 RX ORDER — NITROGLYCERIN 0.4 MG/1
0.4 TABLET SUBLINGUAL EVERY 5 MIN PRN
Status: DISCONTINUED | OUTPATIENT
Start: 2017-04-17 | End: 2017-04-17 | Stop reason: HOSPADM

## 2017-04-17 RX ORDER — EPTIFIBATIDE 2 MG/ML
180 INJECTION, SOLUTION INTRAVENOUS EVERY 10 MIN PRN
Status: DISCONTINUED | OUTPATIENT
Start: 2017-04-17 | End: 2017-04-17 | Stop reason: HOSPADM

## 2017-04-17 RX ORDER — NICARDIPINE HYDROCHLORIDE 2.5 MG/ML
100 INJECTION INTRAVENOUS
Status: DISCONTINUED | OUTPATIENT
Start: 2017-04-17 | End: 2017-04-17 | Stop reason: HOSPADM

## 2017-04-17 RX ORDER — DEXTROSE MONOHYDRATE 25 G/50ML
12.5-5 INJECTION, SOLUTION INTRAVENOUS EVERY 30 MIN PRN
Status: DISCONTINUED | OUTPATIENT
Start: 2017-04-17 | End: 2017-04-17 | Stop reason: HOSPADM

## 2017-04-17 RX ORDER — NALOXONE HYDROCHLORIDE 0.4 MG/ML
.2-.4 INJECTION, SOLUTION INTRAMUSCULAR; INTRAVENOUS; SUBCUTANEOUS
Status: DISCONTINUED | OUTPATIENT
Start: 2017-04-17 | End: 2017-04-17 | Stop reason: HOSPADM

## 2017-04-17 RX ORDER — ARGATROBAN 1 MG/ML
150 INJECTION, SOLUTION INTRAVENOUS
Status: DISCONTINUED | OUTPATIENT
Start: 2017-04-17 | End: 2017-04-17 | Stop reason: HOSPADM

## 2017-04-17 RX ORDER — HEPARIN SODIUM 1000 [USP'U]/ML
1000-10000 INJECTION, SOLUTION INTRAVENOUS; SUBCUTANEOUS EVERY 5 MIN PRN
Status: DISCONTINUED | OUTPATIENT
Start: 2017-04-17 | End: 2017-04-17 | Stop reason: HOSPADM

## 2017-04-17 RX ORDER — LORAZEPAM 2 MG/ML
.5-2 INJECTION INTRAMUSCULAR EVERY 4 HOURS PRN
Status: DISCONTINUED | OUTPATIENT
Start: 2017-04-17 | End: 2017-04-17 | Stop reason: HOSPADM

## 2017-04-17 RX ORDER — ASPIRIN 325 MG
325 TABLET ORAL
Status: DISCONTINUED | OUTPATIENT
Start: 2017-04-17 | End: 2017-04-17 | Stop reason: HOSPADM

## 2017-04-17 RX ORDER — LIDOCAINE 40 MG/G
CREAM TOPICAL
Status: DISCONTINUED | OUTPATIENT
Start: 2017-04-17 | End: 2017-04-17 | Stop reason: HOSPADM

## 2017-04-17 RX ORDER — NALOXONE HYDROCHLORIDE 0.4 MG/ML
0.4 INJECTION, SOLUTION INTRAMUSCULAR; INTRAVENOUS; SUBCUTANEOUS EVERY 5 MIN PRN
Status: DISCONTINUED | OUTPATIENT
Start: 2017-04-17 | End: 2017-04-17 | Stop reason: HOSPADM

## 2017-04-17 RX ORDER — ATROPINE SULFATE 0.1 MG/ML
0.5 INJECTION INTRAVENOUS EVERY 5 MIN PRN
Status: DISCONTINUED | OUTPATIENT
Start: 2017-04-17 | End: 2017-04-17 | Stop reason: HOSPADM

## 2017-04-17 RX ORDER — ENALAPRILAT 1.25 MG/ML
1.25-2.5 INJECTION INTRAVENOUS
Status: DISCONTINUED | OUTPATIENT
Start: 2017-04-17 | End: 2017-04-17 | Stop reason: HOSPADM

## 2017-04-17 RX ORDER — DIPHENHYDRAMINE HYDROCHLORIDE 50 MG/ML
25-50 INJECTION INTRAMUSCULAR; INTRAVENOUS
Status: COMPLETED | OUTPATIENT
Start: 2017-04-17 | End: 2017-04-17

## 2017-04-17 RX ORDER — PROTAMINE SULFATE 10 MG/ML
1-5 INJECTION, SOLUTION INTRAVENOUS
Status: DISCONTINUED | OUTPATIENT
Start: 2017-04-17 | End: 2017-04-17 | Stop reason: HOSPADM

## 2017-04-17 RX ORDER — DOBUTAMINE HYDROCHLORIDE 200 MG/100ML
2-20 INJECTION INTRAVENOUS CONTINUOUS PRN
Status: DISCONTINUED | OUTPATIENT
Start: 2017-04-17 | End: 2017-04-17 | Stop reason: HOSPADM

## 2017-04-17 RX ORDER — NITROGLYCERIN 20 MG/100ML
.07-2 INJECTION INTRAVENOUS CONTINUOUS PRN
Status: DISCONTINUED | OUTPATIENT
Start: 2017-04-17 | End: 2017-04-17 | Stop reason: HOSPADM

## 2017-04-17 RX ORDER — METOPROLOL TARTRATE 1 MG/ML
5 INJECTION, SOLUTION INTRAVENOUS EVERY 5 MIN PRN
Status: DISCONTINUED | OUTPATIENT
Start: 2017-04-17 | End: 2017-04-17 | Stop reason: HOSPADM

## 2017-04-17 RX ORDER — DOPAMINE HYDROCHLORIDE 160 MG/100ML
2-20 INJECTION, SOLUTION INTRAVENOUS CONTINUOUS PRN
Status: DISCONTINUED | OUTPATIENT
Start: 2017-04-17 | End: 2017-04-17 | Stop reason: HOSPADM

## 2017-04-17 RX ORDER — HYDRALAZINE HYDROCHLORIDE 20 MG/ML
10-20 INJECTION INTRAMUSCULAR; INTRAVENOUS
Status: DISCONTINUED | OUTPATIENT
Start: 2017-04-17 | End: 2017-04-17 | Stop reason: HOSPADM

## 2017-04-17 RX ORDER — NITROGLYCERIN 5 MG/ML
100-200 VIAL (ML) INTRAVENOUS
Status: DISCONTINUED | OUTPATIENT
Start: 2017-04-17 | End: 2017-04-17 | Stop reason: HOSPADM

## 2017-04-17 RX ORDER — ADENOSINE 3 MG/ML
12-12000 INJECTION, SOLUTION INTRAVENOUS
Status: DISCONTINUED | OUTPATIENT
Start: 2017-04-17 | End: 2017-04-17 | Stop reason: HOSPADM

## 2017-04-17 RX ORDER — PRASUGREL 10 MG/1
10-60 TABLET, FILM COATED ORAL
Status: DISCONTINUED | OUTPATIENT
Start: 2017-04-17 | End: 2017-04-17 | Stop reason: HOSPADM

## 2017-04-17 RX ORDER — METHYLPREDNISOLONE SODIUM SUCCINATE 125 MG/2ML
125 INJECTION, POWDER, LYOPHILIZED, FOR SOLUTION INTRAMUSCULAR; INTRAVENOUS
Status: DISCONTINUED | OUTPATIENT
Start: 2017-04-17 | End: 2017-04-17 | Stop reason: HOSPADM

## 2017-04-17 RX ORDER — CLOPIDOGREL BISULFATE 75 MG/1
300-600 TABLET ORAL
Status: DISCONTINUED | OUTPATIENT
Start: 2017-04-17 | End: 2017-04-17 | Stop reason: HOSPADM

## 2017-04-17 RX ORDER — ARGATROBAN 1 MG/ML
350 INJECTION, SOLUTION INTRAVENOUS
Status: DISCONTINUED | OUTPATIENT
Start: 2017-04-17 | End: 2017-04-17 | Stop reason: HOSPADM

## 2017-04-17 RX ORDER — ACETAMINOPHEN 325 MG/1
325-650 TABLET ORAL EVERY 4 HOURS PRN
Status: DISCONTINUED | OUTPATIENT
Start: 2017-04-17 | End: 2017-04-17 | Stop reason: HOSPADM

## 2017-04-17 RX ORDER — ATROPINE SULFATE 0.1 MG/ML
.5-1 INJECTION INTRAVENOUS
Status: DISCONTINUED | OUTPATIENT
Start: 2017-04-17 | End: 2017-04-17 | Stop reason: HOSPADM

## 2017-04-17 RX ORDER — PROMETHAZINE HYDROCHLORIDE 25 MG/ML
6.25-25 INJECTION, SOLUTION INTRAMUSCULAR; INTRAVENOUS EVERY 4 HOURS PRN
Status: DISCONTINUED | OUTPATIENT
Start: 2017-04-17 | End: 2017-04-17 | Stop reason: HOSPADM

## 2017-04-17 RX ORDER — CLOPIDOGREL BISULFATE 75 MG/1
75 TABLET ORAL
Status: DISCONTINUED | OUTPATIENT
Start: 2017-04-17 | End: 2017-04-17 | Stop reason: HOSPADM

## 2017-04-17 RX ORDER — POTASSIUM CHLORIDE 7.45 MG/ML
10 INJECTION INTRAVENOUS
Status: DISCONTINUED | OUTPATIENT
Start: 2017-04-17 | End: 2017-04-17 | Stop reason: HOSPADM

## 2017-04-17 RX ORDER — LIDOCAINE HYDROCHLORIDE 10 MG/ML
30 INJECTION, SOLUTION EPIDURAL; INFILTRATION; INTRACAUDAL; PERINEURAL
Status: DISCONTINUED | OUTPATIENT
Start: 2017-04-17 | End: 2017-04-17 | Stop reason: HOSPADM

## 2017-04-17 RX ORDER — ONDANSETRON 2 MG/ML
4 INJECTION INTRAMUSCULAR; INTRAVENOUS EVERY 4 HOURS PRN
Status: DISCONTINUED | OUTPATIENT
Start: 2017-04-17 | End: 2017-04-17 | Stop reason: HOSPADM

## 2017-04-17 RX ORDER — PHENYLEPHRINE HCL IN 0.9% NACL 1 MG/10 ML
20-100 SYRINGE (ML) INTRAVENOUS
Status: DISCONTINUED | OUTPATIENT
Start: 2017-04-17 | End: 2017-04-17 | Stop reason: HOSPADM

## 2017-04-17 RX ORDER — EPTIFIBATIDE 2 MG/ML
2 INJECTION, SOLUTION INTRAVENOUS CONTINUOUS PRN
Status: DISCONTINUED | OUTPATIENT
Start: 2017-04-17 | End: 2017-04-17 | Stop reason: HOSPADM

## 2017-04-17 RX ORDER — SODIUM NITROPRUSSIDE 25 MG/ML
100-200 INJECTION INTRAVENOUS
Status: DISCONTINUED | OUTPATIENT
Start: 2017-04-17 | End: 2017-04-17 | Stop reason: HOSPADM

## 2017-04-17 RX ORDER — POTASSIUM CHLORIDE 29.8 MG/ML
20 INJECTION INTRAVENOUS
Status: DISCONTINUED | OUTPATIENT
Start: 2017-04-17 | End: 2017-04-17 | Stop reason: HOSPADM

## 2017-04-17 RX ORDER — ASPIRIN 81 MG/1
81-324 TABLET, CHEWABLE ORAL
Status: DISCONTINUED | OUTPATIENT
Start: 2017-04-17 | End: 2017-04-17 | Stop reason: HOSPADM

## 2017-04-17 RX ADMIN — DIPHENHYDRAMINE HYDROCHLORIDE 25 MG: 50 INJECTION, SOLUTION INTRAMUSCULAR; INTRAVENOUS at 11:23

## 2017-04-17 RX ADMIN — FENTANYL CITRATE 25 MCG: 50 INJECTION, SOLUTION INTRAMUSCULAR; INTRAVENOUS at 11:18

## 2017-04-17 RX ADMIN — FENTANYL CITRATE 25 MCG: 50 INJECTION, SOLUTION INTRAMUSCULAR; INTRAVENOUS at 11:34

## 2017-04-17 RX ADMIN — IOPAMIDOL 44 ML: 755 INJECTION, SOLUTION INTRAVASCULAR at 13:16

## 2017-04-17 RX ADMIN — SODIUM CHLORIDE: 9 INJECTION, SOLUTION INTRAVENOUS at 08:47

## 2017-04-17 RX ADMIN — FENTANYL CITRATE 25 MCG: 50 INJECTION, SOLUTION INTRAMUSCULAR; INTRAVENOUS at 11:17

## 2017-04-17 RX ADMIN — ASPIRIN 325 MG ORAL TABLET 325 MG: 325 PILL ORAL at 08:38

## 2017-04-17 RX ADMIN — DIPHENHYDRAMINE HYDROCHLORIDE 25 MG: 50 INJECTION, SOLUTION INTRAMUSCULAR; INTRAVENOUS at 11:17

## 2017-04-17 RX ADMIN — FENTANYL CITRATE 25 MCG: 50 INJECTION, SOLUTION INTRAMUSCULAR; INTRAVENOUS at 11:58

## 2017-04-17 RX ADMIN — FENTANYL CITRATE 25 MCG: 50 INJECTION, SOLUTION INTRAMUSCULAR; INTRAVENOUS at 11:27

## 2017-04-17 RX ADMIN — SODIUM CHLORIDE: 9 INJECTION, SOLUTION INTRAVENOUS at 13:50

## 2017-04-17 NOTE — PROCEDURES
PRELIMINARY CARDIAC CATH REPORT:     PROCEDURES PERFORMED:   Right Heart Catheterization  Coronary Angiography  Left Heart Catheterization    PHYSICIANS:  Attending Physician: Shailesh Lay MD  Interventional Cardiology Fellow: Branden Solomon MD  Cardiology Fellow: Audra Chaves    INDICATION:  Frannie Roa is a 81 year old female with risk factor profile (-) HTN, (-) DM, (+) hypercholesterolemia, (-) tobacco use, (-) fam Hx premature CAD, who presents on an elective basis. The clinical presentation was Asymptomatic with regard to anginal equivalents. She does endorse dyspnea with climbing stairs. The patient is being evaluated for severe mitral regurgitation 2/2 mitral prolapse. Her last coronary angiogram was in 2014, which showed evidence of mild disease :25% stenosis within the LAD.     DESCRIPTION:  1. Consent obtained with discussion of risks.  All questions were answered.  2. Sterile prep and procedure.  3. Location with Sheaths:   Rt Femoral Arterial  4 Fr 25 cm [long]  Rt Femoral Venous  7 Fr 25 cm [long]  4. Access: Local anesthetic with lidocaine.  A micropuncture 21 guage needle with ultrasound guidance was used to establish vascular access using a modified Seldinger technique.  5. Diagnostic Catheters:   4 Fr  JL 4, 3DRC  6. Estimated blood loss: < 5 ml    MEDICATIONS:  The procedure was performed under conscious sedation for 63 minutes from 11:17 to 12:20.  Benadryl was administered.  Heart rate, BP, respiration, oxygen saturation and patient responses were monitored throughout the procedure with the assistance of the RN under my supervision.    >> Antiplatelet Therapy: ASA 81 mg     Procedures:    HEMODYNAMICS:  BSA 1.6  1. HR 59 bpm  2. /61/88 mmHg  3. RA 16/13/12   4. RV 59/14  5. PA 57/22/37   6. PCW 23/31/22   7. PA sat 66.3%   8. PCW sat --%  9. Hgb 11.5 g/dL   10. Harlan CO 3.8   11. Harlan CI 2.4   12. TD CO 3.1   13. TD CI 2.0   14. PVR 4.8     CORONARY ANGIOGRAM:   1. Both coronary  arteries arise from their respective cusps.  2. Dominance: Right  3. The LM is without angiographic evidence of disease.   4. LAD: Type 3 [LAD supplies the entire apex]. The LAD is a moderate caliber, tortuous vessel that gives rise to septal perforators, medium sized D1 and small D2.  There is <25% disease within the mid LAD and minimal luminal irregularities throughout the remainder of the vessel.  5. LCX is a medium caliber vessel that gives rise to medium sized OM1 and OM2 before terminating in the AV groove as a diminutive vessel. There are mild luminal irregularities.    6. RCA is a large, dominant vessel that gives rise to PL branches and supplies PDA. There are mild luminal irregularities throughout.    Additional Comment:   On fluoro images, significant mitral annular calcium is seen.      LEFT HEART CATHETERIZATION:  1. LVEDP 19 mmHg.  2. Left ventricular and post capillary wedge pressures were measured simultaneously in diastole: gradient across the mitral valve:4.8 mmHg with calculated valve area of 1.44 cm^2  3. No gradient across the aortic valve on pull back.    Sheath Removal:  The 4 Fr RFA, 7 Fr RFV sheath will be removed after the patient has been transferred to the unit.    Contrast: Isovue, 44 ml     Fluoroscopy Time: 9.7 min    COMPLICATIONS:  1. None    SUMMARY:   >> Mildly elevated right sided filling pressures.  >> High left sided filling pressures.  >> Moderate Pulmonary Hypertension (mixed)  >> High left ventricular filling pressures.  >> Low- Normal cardiac output, 3.8 L/min with index 2.4 L/min/m2      Mild, diffuse coronary atherosclerosis with <25% disease within the mid LAD and no significant change compared with 2014. Hemodynamics consistent with severe mitral regurgitation, with minimal gradient across the valve (4.8 mmHg) at a heart rate of 58 bpm.    PLAN:   >> Bedrest per protocol.  >> Follow-up with Dr. Altamirano re: management of mitral valve  >> Continued medical management and  lifestyle modification for cardiovascular risk factor optimization.   >>. Discharge today per protocol    The attending interventional cardiologist was present and supervised all critical aspects the procedure.    Findings discussed with Dr. Altamirano.    See CVIS report for final draft.    Audra Chaves  Cardiology Fellow    Dr. Shailesh Lay  Cardiology Staff

## 2017-04-17 NOTE — PROGRESS NOTES
Patient discharged home. PIV removed. Patient has all belongings, understands and agrees with d/c instructions.    1-patient is tolerating liquids and foods-yes  2- ambulating-yes  3- urinating-yes  4- puncture sites are stable ( no bleeding and no hematoma)-yes  5- and patient has a -yes  6-IF Vital Signs are within the patient's normal limits or systolic blood pressure greater than 90 mmHg and less than 160 mmHg-yes  7-Patient is alert and oriented-yes  8-If diabetic, blood glucose is in patient's usual normal range-NA    May Discharge when Answer: the following anesthesia criteria are met (list in comments)

## 2017-04-17 NOTE — MR AVS SNAPSHOT
Frannie BABITA Roa   4/17/2017   Anticoagulation Therapy Visit    Description:  81 year old female   Provider:  Lizz Melissa, RN   Department:  [facility]           INR as of 4/17/2017     Today's INR       Anticoagulation Summary as of 4/17/2017     INR goal    Prior goal 2.0-3.0   Today's INR    Next INR check     Indications   Long-term (current) use of anticoagulants [Z79.01] [Z79.01]  Atrial fibrillation (H) [I48.91] [I48.91]         April 2017 Details    Sun Mon Tue Wed Thu Fri Sat           1                 2               3               4               5               6               7               8                 9               10               11               12      5 mg   See details      13      5 mg         14            15                 16               17               18               19               20               21               22                 23               24               25               26               27               28               29                 30                      Date Details   04/12 This INR check       Date of next INR:  4/14/2017         How to take your warfarin dose     To take:  5 mg Take 1 of the 5 mg tablets.    Hold Do not take your warfarin dose. See the Details table to the right for additional instructions.

## 2017-04-17 NOTE — PROGRESS NOTES
Cards Fellow Audra Chaves is paged at ~ 4:45 PM re: resumption of pt's warfarin after cardiac cath today.  As of 5 pm there is no return call.  Will attempt follow up again on 4/18

## 2017-04-17 NOTE — PROGRESS NOTES
Frannie arrived on 2a to prep for a coronary angiogram.  Pre-procedure assessment is complete.  Labs have been collected.  She is being consented.  She is prepped and ready for procedure.

## 2017-04-17 NOTE — DISCHARGE INSTRUCTIONS
Going Home after Coronary AngioGRAM        Name: Frannie Roa  Medical Record Number:  [id number]  Today's Date: April 17, 2017        For 24 hours:         Have an adult stay with you for 24 hours.         Relax and take it easy.         Drink plenty of fluids.         You may eat your normal diet, unless your doctor tells you otherwise.         Do NOT make any important or legal decisions.         Do NOT drive or operate machines at home or at work.         Do NOT drink alcohol.      Do NOT smoke.     Medicines:         If you have begun Plavix (clopidogrel), Effient (prasugrel), or Brilinta (ticagrelor), do not stop taking it until you talk to your heart doctor (cardiologist).         If you are on metformin (Glucophage), do not restart it until you have blood tests (within 2 to 3 days after discharge). When your doctor tells you it is safe, you may restart the metformin.         If you have stopped any other medicines, check with your nurse or provider about when to restart them.    Care of groin site:         Remove the Band-Aid after 24 hours. If there is minor oozing, apply another Band-aid and remove it after 12 hours.          Do NOT take a bath, or use a hot tub or pool for at least 3 days. You may shower.          It is normal to have a small bruise or lump at the site.         Do not scrub the site.         Do not use lotion or powder near the puncture site for 3 days.         For the first 2 days: Do not stoop or squat. When you cough, sneeze or move your bowels, hold your hand over the puncture site and press gently.         Do not lift more than 10 pounds for at least 3 to 5 days.         For 2 days, do NOT have sex or do any heavy exercise.     If you start bleeding from the site in your groin:  Lie down flat and press firmly on the site.  Call your physician immediately, or, come to the emergency room.      Call 911 right away if you have bleeding that is heavy or does not stop.     Call  your doctor if:         You have a large or growing hard lump around the site.         The site is red, swollen, hot or tender.         Blood or fluid is draining from the site.         You have chills or a fever greater than 101 F (38 C).         Your leg or arm turns bluish, feels numb or cool.         You have hives, a rash or unusual itching.

## 2017-04-17 NOTE — IP AVS SNAPSHOT
Unit 6D Observation [address] [address]    Phone:  [phone]    Fax:  [phone]                                       After Visit Summary   4/17/2017    Frannie Roa    MRN: [id number]           After Visit Summary Signature Page     I have received my discharge instructions, and my questions have been answered. I have discussed any challenges I see with this plan with the nurse or doctor.    ..........................................................................................................................................  Patient/Patient Representative Signature      ..........................................................................................................................................  Patient Representative Print Name and Relationship to Patient    ..................................................               ................................................  Date                                            Time    ..........................................................................................................................................  Reviewed by Signature/Title    ...................................................              ..............................................  Date                                                            Time

## 2017-04-17 NOTE — PROGRESS NOTES
Manual pressure held for 10 minutes to RIGHT groin site.  Sheath, size 4FRA, 7FRV,  pulled by MILES HANSEN.  Site CDI, no hematoma.  Stasis achieved at 1400. Off bedrest @ 1600.

## 2017-04-17 NOTE — IP AVS SNAPSHOT
MRN:[id number]                      After Visit Summary   4/17/2017    Frannie Roa    MRN: [id number]           Thank you!     Thank you for choosing [facility] for your care. Our goal is always to provide you with excellent care. Hearing back from our patients is one way we can continue to improve our services. Please take a few minutes to complete the written survey that you may receive in the mail after you visit with us. Thank you!        Patient Information     Date Of Birth          1935        About your hospital stay     You were admitted on:  April 17, 2017 You last received care in the:  Unit 6D Observation [facility]    You were discharged on:  April 17, 2017       Who to Call     For medical emergencies, please call 911.  For non-urgent questions about your medical care, please call your primary care provider or clinic, [phone]          Attending Provider     Provider Specialty    Asim Altamirano MD Cardiology    Lay, Shailesh Doe MD Internal Medicine       Primary Care Provider Office Phone # Fax #    Romelia Parker [phone]       [facility] [address] [place] [address] [place] [address]        After Care Instructions     Discharge Instructions - IF on Metformin (Glucophage or Glucovance) or Metformin containing medications       IF on Metformin (Glucophage or Glucovance) or Metformin containing medications , schedule a Basic Metabolic Panel at [facility] or Primary Clinic in 48 - 72 hours post procedure and PRIOR TO resuming the Metformin or Metformin containing medications.  Hold Metformin (Glucophage or Glucovance) or Metformin containing medications until after the Basic Metabolic Panel on the 2nd or 3rd day following the procedure.  May resume after blood draw is complete.                  Your next 10 appointments already scheduled     Apr 27, 2017  9:00 AM CDT   (Arrive by 8:45 AM)   New Patient Visit with  Shailesh Lay MD   [facility] ([facility]    [address]  [address] [facility] [address] [phone]              Further instructions from your care team       Going Home after Coronary AngioGRAM        Name: Frannie Roa  Medical Record Number:  [id number]  Today's Date: April 17, 2017        For 24 hours:         Have an adult stay with you for 24 hours.         Relax and take it easy.         Drink plenty of fluids.         You may eat your normal diet, unless your doctor tells you otherwise.         Do NOT make any important or legal decisions.         Do NOT drive or operate machines at home or at work.         Do NOT drink alcohol.      Do NOT smoke.     Medicines:         If you have begun Plavix (clopidogrel), Effient (prasugrel), or Brilinta (ticagrelor), do not stop taking it until you talk to your heart doctor (cardiologist).         If you are on metformin (Glucophage), do not restart it until you have blood tests (within 2 to 3 days after discharge). When your doctor tells you it is safe, you may restart the metformin.         If you have stopped any other medicines, check with your nurse or provider about when to restart them.    Care of groin site:         Remove the Band-Aid after 24 hours. If there is minor oozing, apply another Band-aid and remove it after 12 hours.          Do NOT take a bath, or use a hot tub or pool for at least 3 days. You may shower.          It is normal to have a small bruise or lump at the site.         Do not scrub the site.         Do not use lotion or powder near the puncture site for 3 days.         For the first 2 days: Do not stoop or squat. When you cough, sneeze or move your bowels, hold your hand over the puncture site and press gently.         Do not lift more than 10 pounds for at least 3 to 5 days.         For 2 days, do NOT have sex or do any heavy exercise.     If you start bleeding from the  "site in your groin:  Lie down flat and press firmly on the site.  Call your physician immediately, or, come to the emergency room.      Call 911 right away if you have bleeding that is heavy or does not stop.     Call your doctor if:         You have a large or growing hard lump around the site.         The site is red, swollen, hot or tender.         Blood or fluid is draining from the site.         You have chills or a fever greater than 101 F (38 C).         Your leg or arm turns bluish, feels numb or cool.         You have hives, a rash or unusual itching.             Pending Results     No orders found from 4/15/2017 to 2017.            Admission Information     Date & Time Provider Department Dept. Phone    2017 Shailesh Lay MD Unit 6D Observation [facility] [phone]      Your Vitals Were     Blood Pressure Temperature Respirations Pulse Oximetry          118/93 98.3  F (36.8  C) (Oral) 14 96%        MyChart Information     [facility] lets you send messages to your doctor, view your test results, renew your prescriptions, schedule appointments and more. To sign up, go to [email or web address] . Click on \"Log in\" on the left side of the screen, which will take you to the Welcome page. Then click on \"Sign up Now\" on the right side of the page.     You will be asked to enter the access code listed below, as well as some personal information. Please follow the directions to create your username and password.     Your access code is: [email or web address]  Expires: 2017  7:53 PM     Your access code will  in 90 days. If you need help or a new code, please call your [facility] clinic or [phone].        Care EveryWhere ID     This is your Care EveryWhere ID. This could be used by other organizations to access your [facility] medical records  [id number]           Review of your medicines      CONTINUE these medicines which have NOT CHANGED        Dose / Directions    dorzolamide-timolol " 2-0.5 % ophthalmic solution   Commonly known as:  COSOPT        Dose:  1 drop   1 drop 2 times daily.   Refills:  0       FLUZONE HIGH-DOSE 0.5 ML injection   Generic drug:  influenza Vac Split High-Dose        ADM 0.5ML IM UTD   Refills:  0       metoprolol 25 MG tablet   Commonly known as:  LOPRESSOR   Used for:  Myxomatous mitral valve regurgitation, Chronic systolic congestive heart failure (H), Paroxysmal atrial fibrillation (H)        Dose:  12.5 mg   Take 0.5 tablets (12.5 mg) by mouth 2 times daily   Quantity:  90 tablet   Refills:  3       travoprost Z (benzalkonium) 0.004 % ophthalmic solution   Commonly known as:  TRAVATAN Z        Dose:  1 drop   1 drop every evening.   Refills:  0       warfarin 5 MG tablet   Commonly known as:  COUMADIN   Used for:  Myxomatous mitral valve regurgitation, Chronic systolic congestive heart failure (H), Paroxysmal atrial fibrillation (H)        Dose:  5 mg   Take 1 tablet (5 mg) by mouth daily   Quantity:  90 tablet   Refills:  3       zinc sulfate 220 (50 ZN) MG capsule   Commonly known as:  ZINCATE        Dose:  220 mg   Take 220 mg by mouth 2 times daily   Refills:  0                Protect others around you: Learn how to safely use, store and throw away your medicines at www.disposemymeds.org.             Medication List: This is a list of all your medications and when to take them. Check marks below indicate your daily home schedule. Keep this list as a reference.      Medications           Morning Afternoon Evening Bedtime As Needed    dorzolamide-timolol 2-0.5 % ophthalmic solution   Commonly known as:  COSOPT   1 drop 2 times daily.                                FLUZONE HIGH-DOSE 0.5 ML injection   ADM 0.5ML IM UTD   Generic drug:  influenza Vac Split High-Dose                                metoprolol 25 MG tablet   Commonly known as:  LOPRESSOR   Take 0.5 tablets (12.5 mg) by mouth 2 times daily                                travoprost Z (benzalkonium) 0.004 %  ophthalmic solution   Commonly known as:  TRAVATAN Z   1 drop every evening.                                warfarin 5 MG tablet   Commonly known as:  COUMADIN   Take 1 tablet (5 mg) by mouth daily                                zinc sulfate 220 (50 ZN) MG capsule   Commonly known as:  ZINCATE   Take 220 mg by mouth 2 times daily

## 2017-04-18 ENCOUNTER — ANTICOAGULATION THERAPY VISIT (OUTPATIENT)
Dept: ANTICOAGULATION | Facility: CLINIC | Age: 82
End: 2017-04-18

## 2017-04-18 DIAGNOSIS — I48.91 ATRIAL FIBRILLATION, UNSPECIFIED TYPE (H): ICD-10-CM

## 2017-04-18 DIAGNOSIS — Z79.01 LONG-TERM (CURRENT) USE OF ANTICOAGULANTS: ICD-10-CM

## 2017-04-18 NOTE — PROGRESS NOTES
ANTICOAGULATION FOLLOW-UP CLINIC VISIT    Patient Name:  Frannie Roa  Date:  4/18/2017  Contact Type:  Telephone    SUBJECTIVE:        OBJECTIVE    INR Point of Care   Date Value Ref Range Status   04/17/2017 1.1 0.86 - 1.14 Final       ASSESSMENT / PLAN  INR assessment SUB    Recheck INR In: 3 DAYS    INR Location Clinic      Anticoagulation Summary as of 4/18/2017     INR goal    Prior goal 2.0-3.0   Today's INR 1.1! (4/17/2017)   Maintenance plan 7.5 mg (5 mg x 1.5) every day   Full instructions 7.5 mg every day   Weekly total 52.5 mg   Plan last modified Radha Gould RN (4/12/2017)   Next INR check 4/21/2017   Priority INR   Target end date     Indications   Long-term (current) use of anticoagulants [Z79.01] [Z79.01]  Atrial fibrillation (H) [I48.91] [I48.91]         Anticoagulation Episode Summary     INR check location     Preferred lab     Send INR reminders to [facility]    Comments As of 3/20/17: +++++ INR goal range +++2.2-2.7++  3/21/17 Patient will have a finger stick INR done at the [facility] then [facility] will manage /rcj       Anticoagulation Care Providers     Provider Role Specialty Phone number    Asim Altamirano MD Responsible Cardiology [phone]            See the Encounter Report to view Anticoagulation Flowsheet and Dosing Calendar (Go to Encounters tab in chart review, and find the Anticoagulation Therapy Visit)  Spoke with patient.    Mary Sepulveda RN

## 2017-04-18 NOTE — MR AVS SNAPSHOT
Frannie JC Janina   4/18/2017   Anticoagulation Therapy Visit    Description:  81 year old female   Provider:  Mary Sepulveda RN   Department:  [facility]           INR as of 4/18/2017     Today's INR 1.1! (4/17/2017)      Anticoagulation Summary as of 4/18/2017     INR goal    Prior goal 2.0-3.0   Today's INR 1.1! (4/17/2017)   Full instructions 7.5 mg every day   Next INR check 4/21/2017    Indications   Long-term (current) use of anticoagulants [Z79.01] [Z79.01]  Atrial fibrillation (H) [I48.91] [I48.91]         Description     Patient was told by the cardiac nurse yesterday to restart her coumadin last night at 5mg , and call our clinic for further instructions.      April 2017 Details    Sun Mon Tue Wed Thu Fri Sat           1                 2               3               4               5               6               7               8                 9               10               11               12               13               14               15                 16               17               18      7.5 mg   See details      19      7.5 mg         20      7.5 mg         21            22                 23               24               25               26               27               28               29                 30                      Date Details   04/18 This INR check       Date of next INR:  4/21/2017         How to take your warfarin dose     To take:  7.5 mg Take 1.5 of the 5 mg tablets.

## 2017-04-19 ENCOUNTER — DOCUMENTATION ONLY (OUTPATIENT)
Dept: CARDIOLOGY | Facility: CLINIC | Age: 82
End: 2017-04-19

## 2017-04-19 NOTE — PROGRESS NOTES
Mitral-Valve Heart Team Committee Review Note      Mitral-Valve Heart Team Members present:  MD Dr. JOHNATHAN Edwards MD Dr. E. Missov, MD Dr. T. Drexel, MD Cindy Western, MILES Schultz, MILES Velez, MILES Contreras RN       Clinic or Initial evaluation date:  Date of conference:  4/19/2017    Valve Coordinator:  Julia Wallace RN  Referring Physician:  Dr. 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 There is moderate  mitral annular calcification. Severe primary mitral regurgitation is present  with flow reversal in three pulmonary veins. The regurgitant jet is directed  anteriorly. EROA is 0.48 cm2, vena contracta is 0.8 cm, R Vol is 100 ml.  No pericardial effusion is present.      LAKEISHA images were reviewed during Mitral Valve conference.  Pt has severe mitral annular calcification.  It appears that her valve is clippable.      Dr. Lay will speak with both Dr. Mosquera, as well as Dr. Altamirano regarding the next steps of care and treatment for the patient.     I will continue to follow up in regards to the next steps from the MD's, as well as communication with the patient.

## 2017-04-20 ENCOUNTER — TELEPHONE (OUTPATIENT)
Dept: CARDIOLOGY | Facility: CLINIC | Age: 82
End: 2017-04-20

## 2017-04-20 NOTE — TELEPHONE ENCOUNTER
Patient said she was told Dr. Altamirano would call her to talk about her angiogram.  I told her I would get this message to Dr. Altamirano's nurse and someone will be in touch to give her the information she is looking for.      # [phone]

## 2017-04-21 ENCOUNTER — ANTICOAGULATION THERAPY VISIT (OUTPATIENT)
Dept: ANTICOAGULATION | Facility: CLINIC | Age: 82
End: 2017-04-21

## 2017-04-21 DIAGNOSIS — I48.0 PAROXYSMAL ATRIAL FIBRILLATION (H): ICD-10-CM

## 2017-04-21 DIAGNOSIS — Z79.01 LONG-TERM (CURRENT) USE OF ANTICOAGULANTS: ICD-10-CM

## 2017-04-21 DIAGNOSIS — I48.91 ATRIAL FIBRILLATION, UNSPECIFIED TYPE (H): ICD-10-CM

## 2017-04-21 LAB — INR PPP: 1.2 (ref 0.86–1.14)

## 2017-04-21 PROCEDURE — 85610 PROTHROMBIN TIME: CPT | Performed by: FAMILY MEDICINE

## 2017-04-21 PROCEDURE — 36416 COLLJ CAPILLARY BLOOD SPEC: CPT | Performed by: FAMILY MEDICINE

## 2017-04-21 NOTE — MR AVS SNAPSHOT
Frannie JC Janina   4/21/2017   Anticoagulation Therapy Visit    Description:  81 year old female   Provider:  Radha Gould, RN   Department:  [facility]           INR as of 4/21/2017     Today's INR 1.20!      Anticoagulation Summary as of 4/21/2017     INR goal    Prior goal 2.0-3.0   Today's INR 1.20!   Full instructions 4/21: 10 mg; 4/22: 10 mg; 4/23: 10 mg; Otherwise 7.5 mg every day   Next INR check 4/25/2017    Indications   Long-term (current) use of anticoagulants [Z79.01] [Z79.01]  Atrial fibrillation (H) [I48.91] [I48.91]         April 2017 Details    Sun Mon Tue Wed Thu Fri Sat           1                 2               3               4               5               6               7               8                 9               10               11               12               13               14               15                 16               17               18               19               20               21      10 mg   See details      22      10 mg           23      10 mg         24      7.5 mg         25            26               27               28               29                 30                      Date Details   04/21 This INR check       Date of next INR:  4/25/2017         How to take your warfarin dose     To take:  7.5 mg Take 1.5 of the 5 mg tablets.    To take:  10 mg Take 2 of the 5 mg tablets.

## 2017-04-21 NOTE — PROGRESS NOTES
ANTICOAGULATION FOLLOW-UP CLINIC VISIT    Patient Name:  Frannie Roa  Date:  4/21/2017  Contact Type:  Telephone    SUBJECTIVE:     Patient Findings     Positives Intentional hold of therapy           OBJECTIVE    INR   Date Value Ref Range Status   04/21/2017 1.20 (H) 0.86 - 1.14 Final     Comment:     This test is intended for monitoring Coumadin therapy.  Results are not   accurate   in patients with prolonged INR due to factor deficiency.         ASSESSMENT / PLAN  INR assessment SUB    Recheck INR In: 4 DAYS    INR Location Clinic      Anticoagulation Summary as of 4/21/2017     INR goal    Prior goal 2.0-3.0   Today's INR 1.20!   Maintenance plan 7.5 mg (5 mg x 1.5) every day   Full instructions 4/21: 10 mg; 4/22: 10 mg; 4/23: 10 mg; Otherwise 7.5 mg every day   Weekly total 52.5 mg   Plan last modified Radha Gould RN (4/12/2017)   Next INR check 4/25/2017   Priority INR   Target end date     Indications   Long-term (current) use of anticoagulants [Z79.01] [Z79.01]  Atrial fibrillation (H) [I48.91] [I48.91]         Anticoagulation Episode Summary     INR check location     Preferred lab     Send INR reminders to [facility]    Comments As of 3/20/17: +++++ INR goal range +++2.2-2.7++  3/21/17 Patient will have a finger stick INR done at the [facility] then [facility] will manage /rcj HIPPA OK to speak with Parminder Alonzo       Anticoagulation Care Providers     Provider Role Specialty Phone number    Asim Altamirano MD [facility] Cardiology [phone]            See the Encounter Report to view Anticoagulation Flowsheet and Dosing Calendar (Go to Encounters tab in chart review, and find the Anticoagulation Therapy Visit)    Left message with results and dosing recommendations. Asked patient to call back to report any missed doses, falls, signs and symptoms of bleeding or clotting, or any changes to health or diet.     Radha Gould, RN

## 2017-04-23 ENCOUNTER — CARE COORDINATION (OUTPATIENT)
Dept: CARDIOLOGY | Facility: CLINIC | Age: 82
End: 2017-04-23

## 2017-04-24 ENCOUNTER — PRE VISIT (OUTPATIENT)
Dept: CARDIOLOGY | Facility: CLINIC | Age: 82
End: 2017-04-24

## 2017-04-24 ENCOUNTER — CARE COORDINATION (OUTPATIENT)
Dept: CARDIOLOGY | Facility: CLINIC | Age: 82
End: 2017-04-24

## 2017-04-24 NOTE — PROGRESS NOTES
Called Frannie, and LM on voicemail:  Calling to introduce myself, and the mitraclip program, as well as wanting to answer any questions that she may have.  I left my contact information for her to return my telephone call.  Will continue to follow up with Frannie.    Addendum:  Spoke with Frannie, and answered questions regarding the clinic visit, and regarding the procedure.

## 2017-04-24 NOTE — TELEPHONE ENCOUNTER
HPI:  Frannie Roa is a female 81 year old, who has a past medical history of Arthritis; Glaucoma; Hyperlipidemia; Myxomatous mitral valve regurgitation; SVY; and Systolic murmur.    Here today in the cardiology clinic for evaluation of her severe mitral valve regurgitation.      Procedures:    LAKEISHA:  4/11/2017  Left ventricular cavity is small. LVEF is normal 60-65%.  Right ventricular size and function are normal.  Severe biatrial enlargement is present.  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 There is moderate  mitral annular calcification. Severe primary mitral regurgitation is present  with flow reversal in three pulmonary veins. The regurgitant jet is directed  anteriorly. EROA is 0.48 cm2, vena contracta is 0.8 cm, R Vol is 100 ml.  No pericardial effusion is present.     No prior LAKEISHA is available for comparison, but MR severity is worse on current  LAKEISHA study compared with TTE from 03/15/2017.      Cath:  4/17/2017  SUMMARY:   >> Mildly elevated right sided filling pressures.  >> High left sided filling pressures.  >> Moderate Pulmonary Hypertension (mixed)  >> High left ventricular filling pressures.  >> Low- Normal cardiac output, 3.8 L/min with index 2.4 L/min/m2       Mild, diffuse coronary atherosclerosis with <25% disease within the mid LAD and no significant change compared with 2014. Hemodynamics consistent with severe mitral regurgitation, with minimal gradient across the valve (4.8 mmHg) at a heart rate of 58 bpm.

## 2017-04-24 NOTE — PROGRESS NOTES
Frannie called on Friday regarding f/u plan since she has had the LAKEISHA and angiogram. I reviewed with Dr. Altamirano who informed me he had spoken with Dr. Lay after her angiogram.  Her valve needs to be repaired/replaced and she should f/u with Dr. Mosquera.  However, I  noticed Frannie has a f/u appt with Dr. Lay this Thursday, but unfortunately Dr. Mosquera is not in clinic this week. I also nted Julia Wallace RN's note in chart regarding MitraClip and she would be communicating with the patient. As the patient is anxious by nature, I returned her call and informed her that Julia Wallace would be communicating with her. She had multiple questions which I told her would be better answered by Julia or Dr. Lay.    We are unsure at this point if she needs to appt with Dr. Lay or if she will need to come on another day when Dr. Lay and Dr. Mosquera or both available.

## 2017-04-25 ENCOUNTER — ANTICOAGULATION THERAPY VISIT (OUTPATIENT)
Dept: ANTICOAGULATION | Facility: CLINIC | Age: 82
End: 2017-04-25

## 2017-04-25 DIAGNOSIS — Z79.01 LONG-TERM (CURRENT) USE OF ANTICOAGULANTS: ICD-10-CM

## 2017-04-25 DIAGNOSIS — I48.91 ATRIAL FIBRILLATION, UNSPECIFIED TYPE (H): ICD-10-CM

## 2017-04-25 DIAGNOSIS — I48.0 PAROXYSMAL ATRIAL FIBRILLATION (H): ICD-10-CM

## 2017-04-25 DIAGNOSIS — I34.0 MITRAL VALVE INSUFFICIENCY, UNSPECIFIED ETIOLOGY: Primary | ICD-10-CM

## 2017-04-25 LAB — INR PPP: 2 (ref 0.86–1.14)

## 2017-04-25 PROCEDURE — 36416 COLLJ CAPILLARY BLOOD SPEC: CPT | Performed by: FAMILY MEDICINE

## 2017-04-25 PROCEDURE — 85610 PROTHROMBIN TIME: CPT | Performed by: FAMILY MEDICINE

## 2017-04-25 NOTE — PROGRESS NOTES
ANTICOAGULATION FOLLOW-UP CLINIC VISIT    Patient Name:  Frannie Roa  Date:  4/25/2017  Contact Type:  Telephone    SUBJECTIVE:     Patient Findings     Comments She is planning to schedule a surgery on her mitral valve.  She has an appointment 4/27/17 to determine which procedure will be done.             OBJECTIVE    INR   Date Value Ref Range Status   04/25/2017 2.00 (H) 0.86 - 1.14 Final     Comment:     This test is intended for monitoring Coumadin therapy.  Results are not   accurate   in patients with prolonged INR due to factor deficiency.         ASSESSMENT / PLAN  INR assessment SUB    Recheck INR In: 3 DAYS    INR Location Clinic      Anticoagulation Summary as of 4/25/2017     INR goal    Prior goal 2.0-3.0   Today's INR 2.00   Maintenance plan 7.5 mg (5 mg x 1.5) every day   Full instructions 7.5 mg every day   Weekly total 52.5 mg   No change documented Radha Eckert RN   Plan last modified Radha Gould RN (4/12/2017)   Next INR check 4/28/2017   Priority INR   Target end date     Indications   Long-term (current) use of anticoagulants [Z79.01] [Z79.01]  Atrial fibrillation (H) [I48.91] [I48.91]         Anticoagulation Episode Summary     INR check location     Preferred lab     Send INR reminders to [facility]    Comments As of 3/20/17: +++++ INR goal range +++2.2-2.7++  3/21/17 Patient will have a finger stick INR done at the [facility] then [facility] will manage /rcj HIPPA OK to speak with Parminder Alonzo       Anticoagulation Care Providers     Provider Role Specialty Phone number    Asim Altamirano MD [facility] Cardiology [phone]            See the Encounter Report to view Anticoagulation Flowsheet and Dosing Calendar (Go to Encounters tab in chart review, and find the Anticoagulation Therapy Visit)    She is planning to schedule a surgery on her mitral valve.  She has an appointment 4/27/17 to determine which procedure will be done.  She would like to  recheck INR 4/28/17, but may have it done 4/27 when she is in clinic.    Radha Eckert RN

## 2017-04-25 NOTE — MR AVS SNAPSHOT
Frannie BABITA Roa   4/25/2017   Anticoagulation Therapy Visit    Description:  81 year old female   Provider:  Radha Eckert, RN   Department:  [facility]           INR as of 4/25/2017     Today's INR 2.00      Anticoagulation Summary as of 4/25/2017     INR goal    Prior goal 2.0-3.0   Today's INR 2.00   Full instructions 7.5 mg every day   Next INR check 4/28/2017    Indications   Long-term (current) use of anticoagulants [Z79.01] [Z79.01]  Atrial fibrillation (H) [I48.91] [I48.91]         April 2017 Details    Sun Mon Tue Wed Thu Fri Sat           1                 2               3               4               5               6               7               8                 9               10               11               12               13               14               15                 16               17               18               19               20               21               22                 23               24               25      7.5 mg   See details      26      7.5 mg         27      7.5 mg         28            29                 30                      Date Details   04/25 This INR check       Date of next INR:  4/28/2017         How to take your warfarin dose     To take:  7.5 mg Take 1.5 of the 5 mg tablets.

## 2017-04-25 NOTE — PROGRESS NOTES
Date: 4/25/2017    Time of Call: 8:29 AM     Diagnosis:  Severe mitral regurgitation     [ TORB ] Ordering provider: Shailesh Lay MD  Order:   1.  6 minute walk  2.  PAC referral  3.  MitraClip scheduling order     Order received by: Amee Wallace RN     Follow-up/additional notes:   Orders entered for possible upcoming mitraclip procedure.      Addendum:  4/25/17  She is scheduled for a 6 minute walk test at 10:30 and a PAC visit at 1:30 this Thursday.  Frannie stated that these appointments would work out well.

## 2017-04-27 ENCOUNTER — OFFICE VISIT (OUTPATIENT)
Dept: SURGERY | Facility: CLINIC | Age: 82
End: 2017-04-27

## 2017-04-27 ENCOUNTER — OFFICE VISIT (OUTPATIENT)
Dept: CARDIOLOGY | Facility: CLINIC | Age: 82
End: 2017-04-27
Attending: INTERNAL MEDICINE
Payer: MEDICARE

## 2017-04-27 ENCOUNTER — ANTICOAGULATION THERAPY VISIT (OUTPATIENT)
Dept: ANTICOAGULATION | Facility: CLINIC | Age: 82
End: 2017-04-27

## 2017-04-27 ENCOUNTER — CARE COORDINATION (OUTPATIENT)
Dept: CARDIOLOGY | Facility: CLINIC | Age: 82
End: 2017-04-27

## 2017-04-27 ENCOUNTER — ANESTHESIA EVENT (OUTPATIENT)
Dept: SURGERY | Facility: CLINIC | Age: 82
DRG: 229 | End: 2017-04-27
Payer: MEDICARE

## 2017-04-27 ENCOUNTER — ALLIED HEALTH/NURSE VISIT (OUTPATIENT)
Dept: SURGERY | Facility: CLINIC | Age: 82
End: 2017-04-27

## 2017-04-27 VITALS
HEART RATE: 71 BPM | WEIGHT: 124 LBS | TEMPERATURE: 97.9 F | OXYGEN SATURATION: 96 % | SYSTOLIC BLOOD PRESSURE: 117 MMHG | BODY MASS INDEX: 21.97 KG/M2 | RESPIRATION RATE: 17 BRPM | DIASTOLIC BLOOD PRESSURE: 72 MMHG

## 2017-04-27 VITALS
WEIGHT: 124.2 LBS | SYSTOLIC BLOOD PRESSURE: 114 MMHG | DIASTOLIC BLOOD PRESSURE: 73 MMHG | HEART RATE: 73 BPM | OXYGEN SATURATION: 96 % | BODY MASS INDEX: 22.01 KG/M2 | HEIGHT: 63 IN

## 2017-04-27 DIAGNOSIS — Z01.818 PREOP EXAMINATION: ICD-10-CM

## 2017-04-27 DIAGNOSIS — I48.91 ATRIAL FIBRILLATION, UNSPECIFIED TYPE (H): ICD-10-CM

## 2017-04-27 DIAGNOSIS — I34.0 NON-RHEUMATIC MITRAL REGURGITATION: ICD-10-CM

## 2017-04-27 DIAGNOSIS — I34.0 NON-RHEUMATIC MITRAL REGURGITATION: Primary | ICD-10-CM

## 2017-04-27 DIAGNOSIS — I34.0 MITRAL VALVE INSUFFICIENCY, UNSPECIFIED ETIOLOGY: ICD-10-CM

## 2017-04-27 DIAGNOSIS — I34.0 MITRAL VALVE INSUFFICIENCY, UNSPECIFIED ETIOLOGY: Primary | ICD-10-CM

## 2017-04-27 DIAGNOSIS — Z79.01 LONG-TERM (CURRENT) USE OF ANTICOAGULANTS: ICD-10-CM

## 2017-04-27 DIAGNOSIS — Z01.818 PREOP EXAMINATION: Primary | ICD-10-CM

## 2017-04-27 LAB
6 MIN WALK (FT): 1495 FT
6 MIN WALK (M): 456 M
INR PPP: 1.64 (ref 0.86–1.14)

## 2017-04-27 PROCEDURE — 99214 OFFICE O/P EST MOD 30 MIN: CPT | Mod: ZP | Performed by: INTERNAL MEDICINE

## 2017-04-27 PROCEDURE — 99212 OFFICE O/P EST SF 10 MIN: CPT | Mod: ZF

## 2017-04-27 RX ORDER — ASPIRIN 325 MG
325 TABLET ORAL ONCE
Status: CANCELLED | OUTPATIENT
Start: 2017-04-27

## 2017-04-27 RX ORDER — LIDOCAINE 40 MG/G
CREAM TOPICAL
Status: CANCELLED | OUTPATIENT
Start: 2017-04-27

## 2017-04-27 RX ORDER — SODIUM CHLORIDE 9 MG/ML
INJECTION, SOLUTION INTRAVENOUS CONTINUOUS
Status: CANCELLED | OUTPATIENT
Start: 2017-04-27

## 2017-04-27 RX ORDER — CLINDAMYCIN PHOSPHATE 900 MG/50ML
900 INJECTION, SOLUTION INTRAVENOUS
Status: CANCELLED | OUTPATIENT
Start: 2017-04-27

## 2017-04-27 ASSESSMENT — PAIN SCALES - GENERAL
PAINLEVEL: NO PAIN (0)
PAINLEVEL: NO PAIN (0)

## 2017-04-27 ASSESSMENT — ENCOUNTER SYMPTOMS: DYSRHYTHMIAS: 1

## 2017-04-27 NOTE — PROGRESS NOTES
Diagnosis: Mitral regurgitation  Plan:  1. MitraClip procedure scheduled for May 8. The hospital will call patient to tell them time of surgery.  2. Medications changes:  Take Aspirin 325 mg, by mouth, the morning of the procedure.     **STOP coumadin 5 days prior to procedure. LAST dose of coumadin will be May 2. NO coumadin starting May 3.      On May 3rd, Tuesday, please start a daily aspirin 325 mg.  Please take this on the morning of your procedure.     If any questions please contact:  Julia Wallace RN  Structural Heart Care Coordinator  [facility]  Office: [phone]  Pager: [phone]

## 2017-04-27 NOTE — H&P
Pre-Operative H & P     CC:  Preoperative exam to assess for increased cardiopulmonary risk while undergoing surgery and anesthesia.    Date of Encounter: 4/27/2017  Primary Care Physician:  Romelia Parker  Frannie Roa is a 81 year old female who presents for pre-operative H & P in preparation for percutaneous mitral valve repair with Dr. Lay on 5/8/17 at [facility]. History is obtained from the patient.     Patient with history of myxomatous mitral valve regurgitation, now severe, discovered after presenting to ED with atrial fibrillation. She was medically converted and anticoagulated. She remains on Metoprolol and Coumadin. Consult with Dr. Altamirano and Alireza with above procedure now planned.    Patient has been feeling generally well but notes some occasional dizziness which she attributes to the Metoprolol. Historically she has been very active.    Past Medical History  Past Medical History:   Diagnosis Date     Arthritis     osteoarthritis     Atrial fibrillation (H)      Bronchitis 02/2017     Glaucoma      Hyperlipidemia 2/1/2015     Myxomatous mitral valve regurgitation 4/22/2014     SVT (supraventricular tachycardia) (H) 8/25/2014     Systolic murmur 4/22/2014       Past Surgical History  Past Surgical History:   Procedure Laterality Date     ANGIOGRAM  04/23/2017     BIOPSY      breast     ENT SURGERY      T & A     EYE SURGERY      Laser Trabelectomy for glaucoma     ORTHOPEDIC SURGERY      Right ankle cyst removal requiring ORIF     PHACOEMULSIFICATION CLEAR CORNEA W/ STANDARD IOL IMPLANT, ENDOSCOPIC CYCLOPHOTOCOAGULATION, COMBINED  11/28/2011    Procedure:COMBINED PHACOEMULSIFICATION CLEAR CORNEA WITH STANDARD INTRAOCULAR LENS IMPLANT, ENDOSCOPIC CYCLOPHOTOCOAGULATION; LEFT EYE PHACOEMULSIFICATION CLEAR CORNEA WITH STANDARD INTRAOCULAR LENS IMPLANT, ENDOSCOPIC CYCLOPHOTOCOAGULATION ; Surgeon:ELOY WARE; Location:[facility]  Blood transfusions/reactions: Denies.    Hx of abnormal bleeding or anti-platelet use: Anticoagulated with Coumadin    Menstrual history: No LMP recorded. Patient is postmenopausal.    Steroid use in the last year: Denies.    Personal or FH with difficulty with Anesthesia:  Denies. Reports that it doesn't take much medication for her.    Prior to Admission Medications  Current Outpatient Prescriptions   Medication Sig Dispense Refill     warfarin (COUMADIN) 5 MG tablet Take 1 tablet (5 mg) by mouth daily (Patient taking differently: Take 7.5 mg by mouth daily ) 90 tablet 3     metoprolol (LOPRESSOR) 25 MG tablet Take 0.5 tablets (12.5 mg) by mouth 2 times daily 90 tablet 3     zinc sulfate (ZINCATE) 220 MG capsule Take 220 mg by mouth 2 times daily       dorzolamide-timolol (COSOPT) 2-0.5 % ophthalmic solution Place 1 drop Into the left eye 2 times daily        travoprost Z, benzalkonium, (TRAVATAN Z) 0.004 % ophthalmic solution Place 1 drop into both eyes every evening        [START ON 5/3/2017] ASPIRIN PO Take 81 mg by mouth daily         Allergies  Allergies   Allergen Reactions     Penicillins      Sulfa Drugs        Social History  Social History     Social History     Marital status:      Spouse name: N/A     Number of children: N/A     Years of education: N/A     Occupational History     Not on file.     Social History Main Topics     Smoking status: Never Smoker     Smokeless tobacco: Never Used     Alcohol use 0.0 oz/week     0 Standard drinks or equivalent per week      Comment: 3 glasses wine/week     Drug use: No     Sexual activity: Yes     Partners: Male     Other Topics Concern     Parent/Sibling W/ Cabg, Mi Or Angioplasty Before 65f 55m? No     Social History Narrative       Family History  Family History   Problem Relation Age of Onset     Colon Cancer Mother      Myocardial Infarction Maternal Grandmother      Myocardial Infarction Paternal Grandfather      Other Cancer Sister      Leukemia  Daughter        Review of Systems  The complete review of systems is negative other than noted in the HPI or here.   Constitutional: Denies fever, chills, weight loss.  Skin: Right groin rash after angiogram, nearly resolved and examined by Dr. Lay today.  HEENT: Wears glasses for vision.  Respiratory: Denies cough or shortness of breath.  CV: Denies chest pain or irregular HR. More limited activity recently.  GI: Denies abdominal pain or bowel issues.  : Denies dysuria.  Neuro: Occasional dizziness.    Temp: 97.9  F (36.6  C) Temp src: Oral BP: 117/72 Pulse: 71   Resp: 17 SpO2: 96 % (RA)         124 lbs 0 oz  Data Unavailable   Body mass index is 21.97 kg/(m^2).       Physical Exam  Constitutional: Awake, alert, cooperative, no apparent distress, and appears stated age. Accompanied by .  Eyes: Pupils equal, round and reactive to light, extra ocular muscles intact, sclera with mild redness, conjunctiva normal.   HENT: Normocephalic, oral pharynx with moist mucus membranes, good dentition. No goiter appreciated.   Respiratory: Clear to auscultation bilaterally, no crackles or wheezing.  Cardiovascular: Regular rate and rhythm, systolic murmur noted. Carotids, no bruits. No edema. Palpable pulses to radial  DP and PT arteries.   GI: Normal bowel sounds, soft, non-distended, non-tender, no masses palpated, no hepatosplenomegaly.    Lymph/Hematologic: No cervical lymphadenopathy and no supraclavicular lymphadenopathy.  Genitourinary:  Deferred.  Skin: Warm and dry.  No rashes at anticipated surgical site.   Musculoskeletal: Full ROM of neck. There is no redness, warmth, or swelling of the joints. Gross motor strength is normal.    Neurologic: Awake, alert, oriented to name, place and time. Cranial nerves II-XII are grossly intact. Gait is normal.   Neuropsychiatric: Calm, cooperative. Normal affect.     Labs: (personally reviewed)  Lab Results   Component Value Date    WBC 7.0 04/17/2017     Lab Results    Component Value Date    RBC 3.98 04/17/2017     Lab Results   Component Value Date    HGB 11.5 04/17/2017     Lab Results   Component Value Date    HCT 36.4 04/17/2017     Lab Results   Component Value Date    MCV 92 04/17/2017     Lab Results   Component Value Date    MCH 28.9 04/17/2017     Lab Results   Component Value Date    MCHC 31.6 04/17/2017     Lab Results   Component Value Date    RDW 14.0 04/17/2017     Lab Results   Component Value Date     04/17/2017     Last Basic Metabolic Panel:  Lab Results   Component Value Date     04/17/2017      Lab Results   Component Value Date    POTASSIUM 4.2 04/17/2017     Lab Results   Component Value Date    CHLORIDE 107 04/17/2017     Lab Results   Component Value Date    NICOLE 8.3 04/17/2017     Lab Results   Component Value Date    CO2 25 04/17/2017     Lab Results   Component Value Date    BUN 21 04/17/2017     Lab Results   Component Value Date    CR 0.57 04/17/2017     Lab Results   Component Value Date    GLC 91 04/17/2017     Lab Results   Component Value Date    AST 24 10/18/2016     Lab Results   Component Value Date    ALT 33 10/18/2016     No results found for: BILICONJ   Lab Results   Component Value Date    BILITOTAL 0.5 10/18/2016     Lab Results   Component Value Date    ALBUMIN 3.6 10/18/2016     Lab Results   Component Value Date    PROTTOTAL 7.0 10/18/2016      Lab Results   Component Value Date    ALKPHOS 72 10/18/2016     INR today: 1.64  EKG: Personally reviewed 4/11/17 Sinus rhythm  Cardiac echo: 4/11/17 LAKEISHA  Interpretation Summary  Left ventricular cavity is small. LVEF is normal 60-65%.  Right ventricular size and function are normal. Severe biatrial enlargement is present. 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 There is moderate  mitral annular calcification. Severe primary mitral regurgitation is present with flow reversal in three pulmonary veins. The regurgitant jet is directed anteriorly. EROA is 0.48 cm2,  vena contracta is 0.8 cm, R Vol is 100 ml. No pericardial effusion is present.     No prior LAKEISHA is available for comparison, but MR severity is worse on current LAKEISHA study compared with TTE from 03/15/2017.    3/15/17 TTE  Interpretation Summary  Global and regional left ventricular function is normal with an EF of 60-65%.  The LV filling pattern is c/w grade III diastolic dysfunction and increased  left atrial pressure.  Global right ventricular function is normal.  Severe left atrial enlargement.  Moderate mitral annular calcification. Bileaflet mitral valve prolapse is  present.  Moderate to severe mitral insufficiency. The ERO is 30 mm2 and the RVol is 54  mL.  Pulmonary artery systolic pressure is normal.  The inferior vena cava is normal in size with preserved respiratory  variability. Estimated mean right atrial pressure is <3 mmHg.  No pericardial effusion is present.  This study was compared with the study from 10/14/2016. The degree of MR is  quantified as described above.    Cath 4/23/17  Severe mitral valvular calcification, severe. Mitral valve regurgitation, severe, rheumatic. Secondary pulmonary arterial hypertension, moderate. Diffuse coronary atherosclerosis without significant focal stenosis.    CT Chest 3/2017  IMPRESSION:  1. No evidence of pulmonary embolism. Thoracic aorta is unremarkable  with a few scattered calcified plaques.  2. Lungs are clear. No infiltrate or consolidation. No enlarged lymph  nodes.  3. Prominent mitral annular calcification with questionable left  atrial enlargement. Follow-up cardiac echo is clinically warranted for  further assessment.    Outside records reviewed from: Care Everywhere    ASSESSMENT and PLAN  Frannie Roa is a 81 year old female scheduled to undergo percutaneous mitral valve repair on 5/8/17 by Dr. Lay. She has the following specific operative considerations:   - RCRI : 0.9% risk of major adverse cardiac event.   - Anesthesia considerations:   Refer to PAC assessment in anesthesia records  - VTE risk: 0.5%  - If afib, [id number] score 4.  Risk category High.    - Risk of PONV score = 2.  If > 2, anti-emetic intervention recommended.     --Severe myxomatous mitral valve regurgitation. Above procedure now planned with GA. Patient comfortable with plan.   --Recent development of atrial fibrillation. Medically converted. Maintained on Metoprolol and will take on DOS. Anticoagulated with Coumadin. Patient has been instructed by Cards team to hold Coumadin for 5 days prior to procedure and will take one  mg on DOS. Cardiac evaluation above including cath with diffuse coronary atherosclerosis without significant focal stenosis. Has been very active over time.   --Nonsmoker. History of bronchitis in 2/2017, now resolved. No pulmonary history.   --OA. Osteoporosis.    --Glaucoma.   --Difficult IV stick. Will require Vascular Access.   Type and screen drawn today.    Arrival time, NPO, shower and medication instructions provided by nursing staff today. Preparing For Your Surgery handout given.        Patient was discussed with Dr Estrada.    ELMO Chisholm CNS  Preoperative Assessment Center  [facility]  Clinic and Surgery Center  Phone: [phone]  Fax: [phone]

## 2017-04-27 NOTE — MR AVS SNAPSHOT
Frannie JC Janina   4/27/2017   Anticoagulation Therapy Visit    Description:  81 year old female   Provider:  Priscilla London, RN   Department:  [facility]           INR as of 4/27/2017     Today's INR       Anticoagulation Summary as of 4/27/2017     INR goal    Prior goal 2.0-3.0   Today's INR    Next INR check     Indications   Long-term (current) use of anticoagulants [Z79.01] [Z79.01]  Atrial fibrillation (H) [I48.91] [I48.91]         April 2017 Details    Sun Mon Tue Wed Thu Fri Sat           1                 2               3               4               5               6               7               8                 9               10               11               12               13               14               15                 16               17               18               19               20               21               22                 23               24               25      7.5 mg   See details      26      7.5 mg         27      7.5 mg         28            29                 30                      Date Details   04/25 This INR check       Date of next INR:  4/28/2017         How to take your warfarin dose     To take:  7.5 mg Take 1.5 of the 5 mg tablets.

## 2017-04-27 NOTE — PROGRESS NOTES
ANTICOAGULATION FOLLOW-UP CLINIC VISIT    Patient Name:  Frannie Roa  Date:  4/27/2017  Contact Type:  Telephone    SUBJECTIVE:        OBJECTIVE    INR   Date Value Ref Range Status   04/27/2017 1.64 (H) 0.86 - 1.14 Final       ASSESSMENT / PLAN  INR assessment SUB    Recheck INR In: 6 DAYS    INR Location Clinic      Anticoagulation Summary as of 4/27/2017     INR goal    Prior goal 2.0-3.0   Today's INR 1.64!   Maintenance plan 7.5 mg (5 mg x 1.5) every day   Full instructions 4/27: 10 mg; 4/28: 10 mg; Otherwise 7.5 mg every day   Weekly total 52.5 mg   Plan last modified Radha Gould RN (4/12/2017)   Next INR check 5/2/2017   Priority INR   Target end date     Indications   Long-term (current) use of anticoagulants [Z79.01] [Z79.01]  Atrial fibrillation (H) [I48.91] [I48.91]         Anticoagulation Episode Summary     INR check location     Preferred lab     Send INR reminders to [facility]    Comments As of 3/20/17: +++++ INR goal range +++2.2-2.7++  3/21/17 Patient will have a finger stick INR done at the [facility] then [facility] will manage /petr QUINONEZ to speak with Parminder Alonzo       Anticoagulation Care Providers     Provider Role Specialty Phone number    Asim Altamirano MD Responsible Cardiology [phone]            See the Encounter Report to view Anticoagulation Flowsheet and Dosing Calendar (Go to Encounters tab in chart review, and find the Anticoagulation Therapy Visit)    Left message for patient with results and dosing recommendations. Asked patient to call back to report any missed doses, falls, signs and symptoms of bleeding or clotting, any changes in health, medication, or diet. Asked patient to call back with any questions or concerns.    Priscilla London, MILES

## 2017-04-27 NOTE — MR AVS SNAPSHOT
After Visit Summary   4/27/2017    Frannie Roa    MRN: [id number]           Patient Information     Date Of Birth          1935        Visit Information        Provider Department      4/27/2017 1:30 PM Pharmacist, Ryan Ramirez [facility] [place]        Today's Diagnoses     Preop examination    -  1       Follow-ups after your visit        Your next 10 appointments already scheduled     Apr 27, 2017  3:00 PM CDT   (Arrive by 2:45 PM)   PAC Anesthesia Consult with Ryan Pac Anesthesiologist   [facility] Preoperative Assessment [place] ([facility])    [address] [place] [address]   [phone]            Apr 27, 2017  3:30 PM CDT   (Arrive by 3:15 PM)   PAC RN ASSESSMENT with Ryan Pac Rn   [facility] [place] ([facility])    [address] [place] [id number]   [phone]            Apr 27, 2017  4:00 PM CDT   LAB with [facility]   [facility])    [address] [place] [id number]   [phone]           Patient must bring picture ID.  Patient should be prepared to give a urine specimen  Please do not eat 10-12 hours before your appointment if you are coming in fasting for labs on lipids, cholesterol, or glucose (sugar).  Pregnant women should follow their Care Team instructions. Water with medications is okay. Do not drink coffee or other fluids.   If you have concerns about taking  your medications, please ask at office or if scheduling via [employer], send a message by clicking on Secure Messaging, Message Your Care Team.            May 08, 2017   Procedure with Shailesh Lay MD   [facility]    [address] [facility] [address]   [phone]            May 08, 2017  7:30 AM CDT   Percutaneious Mitral Juanita Rep with [id number]   [facility]  ([facility] [place])    [address] [facility] [address] [phone]              Future tests that were ordered for you today     Open Future Orders        Priority Expected Expires Ordered    ABO/Rh type and screen Routine 2017            Who to contact     Please call your clinic at [phone] to:    Ask questions about your health    Make or cancel appointments    Discuss your medicines    Learn about your test results    Speak to your doctor   If you have compliments or concerns about an experience at your clinic, or if you wish to file a complaint, please contact [facility] Patient Relations at [phone] or email us at [email or web address]         Additional Information About Your Visit        [employer] Information     [employer] is an electronic gateway that provides easy, online access to your medical records. With [employer], you can request a clinic appointment, read your test results, renew a prescription or communicate with your care team.     To sign up for [employer] visit the website at [email or web address]   You will be asked to enter the access code listed below, as well as some personal information. Please follow the directions to create your username and password.     Your access code is: [email or web address]  Expires: 2017  7:53 PM     Your access code will  in 90 days. If you need help or a new code, please contact your [facility] or call [phone] for assistance.        Care EveryWhere ID     This is your Care EveryWhere ID. This could be used by other organizations to access your [facility] medical records  [id number]         Blood Pressure from Last 3 Encounters:   17 117/72   17 114/73   17 (!) 118/93    Weight from Last 3 Encounters:   17 56.2 kg (124 lb)   17 56.3 kg (124 lb 3.2 oz)   17 55.2 kg (121 lb 11.1 oz)               Today, you had the following     No orders found for display         Today's Medication Changes          These changes are accurate as of: 4/27/17  2:00 PM.  If you have any questions, ask your nurse or doctor.               These medicines have changed or have updated prescriptions.        Dose/Directions    warfarin 5 MG tablet   Commonly known as:  COUMADIN   This may have changed:  how much to take   Used for:  Myxomatous mitral valve regurgitation, Chronic systolic congestive heart failure (H), Paroxysmal atrial fibrillation (H)        Dose:  5 mg   Take 1 tablet (5 mg) by mouth daily   Quantity:  90 tablet   Refills:  3                Primary Care Provider Office Phone # Fax #    Romelia Parker [phone]       [place]  [address]   [facility] [address]        Thank you!     Thank you for choosing [facility] PREOPERATIVE ASSESSMENT [place]  for your care. Our goal is always to provide you with excellent care. Hearing back from our patients is one way we can continue to improve our services. Please take a few minutes to complete the written survey that you may receive in the mail after your visit with us. Thank you!             Your Updated Medication List - Protect others around you: Learn how to safely use, store and throw away your medicines at www.disposemymeds.org.          This list is accurate as of: 4/27/17  2:00 PM.  Always use your most recent med list.                   Brand Name Dispense Instructions for use    dorzolamide-timolol 2-0.5 % ophthalmic solution    COSOPT     Place 1 drop Into the left eye 2 times daily       metoprolol 25 MG tablet    LOPRESSOR    90 tablet    Take 0.5 tablets (12.5 mg) by mouth 2 times daily       travoprost Z (benzalkonium) 0.004 % ophthalmic solution    TRAVATAN Z     Place 1 drop into both eyes every evening       warfarin 5 MG tablet    COUMADIN    90 tablet    Take 1 tablet (5 mg) by mouth daily        zinc sulfate 220 (50 ZN) MG capsule    ZINCATE     Take 220 mg by mouth 2 times daily

## 2017-04-27 NOTE — LETTER
4/27/2017      RE: Frannie Roa  [address] [facility] [address]       Dear Colleague,    Thank you for the opportunity to participate in the care of your patient, Frannie Roa, at the [facility] HEART [facility]. Please see a copy of my visit note below.    HPI:     Frannie is a 82yo female who is refd in consultation by Dr. Asim Altamirano MD for consideration of a Mitraclip repair for DMR. The patient presented with Afib and was found to have MR.  This led to subsequent evaluations.  She feels fatigued and dyspneic on accustomed exertion and this has been progressively worsening.  The patient had a coronary angiogram that showed no flow limiting lesions. RHC shows moderate PAH. CO/CI ; 3.1/2.   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       travoprost Z, benzalkonium, (TRAVATAN Z) 0.004 % ophthalmic solution 1 drop every evening.         PAST SURGICAL HISTORY:  Past Surgical History:   Procedure Laterality Date     BIOPSY      breast     ENT SURGERY      T & A     EYE SURGERY      Laser Trabelectomy for glaucoma     ORTHOPEDIC SURGERY      Right ankle cyst removal requiring ORIF      "PHACOEMULSIFICATION CLEAR CORNEA W/ STANDARD IOL IMPLANT, ENDOSCOPIC CYCLOPHOTOCOAGULATION, COMBINED  11/28/2011    Procedure:COMBINED PHACOEMULSIFICATION CLEAR CORNEA WITH STANDARD INTRAOCULAR LENS IMPLANT, ENDOSCOPIC CYCLOPHOTOCOAGULATION; LEFT EYE PHACOEMULSIFICATION CLEAR CORNEA WITH STANDARD INTRAOCULAR LENS IMPLANT, ENDOSCOPIC CYCLOPHOTOCOAGULATION ; Surgeon:ELOY WARE; Location:[facility]       ALLERGIES     Allergies   Allergen Reactions     Penicillins      Sulfa Drugs        FAMILY HISTORY:  Family History   Problem Relation Age of Onset     Colon Cancer Mother      Myocardial Infarction Maternal Grandmother      Myocardial Infarction Paternal Grandfather      Other Cancer Sister      Leukemia Daughter        SOCIAL HISTORY:  Social History     Social History     Marital status:      Spouse name: N/A     Number of children: N/A     Years of education: N/A     Social History Main Topics     Smoking status: Never Smoker     Smokeless tobacco: Never Used     Alcohol use 0.0 oz/week     0 Standard drinks or equivalent per week      Comment: 3 glasses wine/week     Drug use: No     Sexual activity: Yes     Partners: Male     Other Topics Concern     Parent/Sibling W/ Cabg, Mi Or Angioplasty Before 65f 55m? No     Social History Narrative       ROS:   Constitutional: No fever, chills, or sweats. No weight gain/loss   ENT: No visual disturbance, ear ache, epistaxis, sore throat  Allergies/Immunologic: Negative.   Respiratory: No cough, hemoptysia  Cardiovascular: As per HPI  GI: No nausea, vomiting, hematemesis, melena, or hematochezia  : No urinary frequency, dysuria, or hematuria  Integument: Negative  Psychiatric: Negative  Neuro: Negative  Endocrinology: Negative   Musculoskeletal: Negative    EXAM:  /73 (BP Location: Right arm, Patient Position: Chair, Cuff Size: Adult Regular)  Pulse 73  Ht 1.6 m (5' 3\")  Wt 56.3 kg (124 lb 3.2 oz)  SpO2 96%  BMI 22 kg/m2  In general, the patient is a " pleasant female in no apparent distress.    HEENT: NC/AT.  PERRLA.  EOMI.  Sclerae white, not injected.  Nares clear.  Pharynx without erythema or exudate.  Dentition intact.    Neck: No adenopathy.  No thyromegaly. Carotids +4/4 bilaterally without bruits.  No jugular venous distension.   Heart: RRR. Normal S1, S2 splits physiologically. 3/6 holosystolic murmur at the apex.The PMI is in the 5th ICS in the midclavicular line. There is no heave.    Lungs: CTA.  No ronchi, wheezes, rales.  No dullness to percussion.   Abdomen: Soft, nontender, nondistended. No organomegaly.  No bruits.   Extremities: No clubbing, cyanosis, or edema.  The pulses are +4/4 at the radial, brachial, femoral, popliteal, DP, and PT sites bilaterally.  No bruits are noted.  Neurologic: Alert and oriented to person/place/time, normal speech, gait and affect  Skin: No petechiae, purpura or rash.    Labs:  LIPID RESULTS:  Lab Results   Component Value Date    CHOL 233 (H) 03/17/2017    HDL 68 03/17/2017     (H) 03/17/2017    TRIG 89 03/17/2017    NHDL 165 (H) 03/17/2017       LIVER ENZYME RESULTS:  Lab Results   Component Value Date    AST 24 10/18/2016    ALT 33 10/18/2016       CBC RESULTS:  Lab Results   Component Value Date    WBC 7.0 04/17/2017    RBC 3.98 04/17/2017    HGB 11.5 (L) 04/17/2017    HCT 36.4 04/17/2017    MCV 92 04/17/2017    MCH 28.9 04/17/2017    MCHC 31.6 04/17/2017    RDW 14.0 04/17/2017     04/17/2017       BMP RESULTS:  Lab Results   Component Value Date     04/17/2017    POTASSIUM 4.2 04/17/2017    CHLORIDE 107 04/17/2017    CO2 25 04/17/2017    ANIONGAP 7 04/17/2017    GLC 91 04/17/2017    BUN 21 04/17/2017    CR 0.57 04/17/2017    GFRESTIMATED >90  Non  GFR Calc   04/17/2017    GFRESTBLACK >90   GFR Calc   04/17/2017    NICOLE 8.3 (L) 04/17/2017        A1C RESULTS:  No results found for: A1C    INR RESULTS:  Lab Results   Component Value Date    INR 2.00 (H) 04/25/2017  "   INR 1.20 (H) 04/21/2017       Procedures:      Assessment and Plan:     Frannie has severe DMR. The STS risk score for MVR is 4.2%. Due to dense MAC, mitral valve replacement if needed would be challenging and has a higher risk of strokes and paravalvular leak. Discussed at length at the Mitral conference and subsequently with Dr. Campos Mosquera MD ( The \"heart team\" ) who is also in agreement that a Mitraclip procedure would be a reasonable option. I spent 30 minutes in a face to face discussion with Frannie and her  about the Mitraclip procedure and its risk and benefits. They are willing to proceeed.    We will schedule the procedure accordingly.      Please do not hesitate to contact me if you have any questions/concerns.     Sincerely,     Shailesh Lay MD    CC  Patient Care Team:  Romelia Parker MD as PCP - General (Internal Medicine)  Debora Whalen APRN CNS as Nurse Coordinator (Clinical Nurse Specialist)  Asim Altamirano MD as MD (Cardiology)        "

## 2017-04-27 NOTE — PROGRESS NOTES
Preoperative Assessment Center medication history for April 27, 2017 is complete.    See Epic admission navigator for allergy information, pharmacy and prior to admission medications.    Operating room staff will still need to confirm medications and last dose information on day of surgery.     Medication history interview sources:  patient    Changes made to PTA medication list (reason)  Added: none  Deleted: none  Changed: dose updated on warfarin and cosopt    Additional medication history information (including reliability of information, actions taken by pharmacist):None      Prior to Admission medications    Medication Sig Last Dose Taking? Auth Provider   warfarin (COUMADIN) 5 MG tablet Take 1 tablet (5 mg) by mouth daily  Patient taking differently: Take 7.5 mg by mouth daily  4/26/2017 at Unknown time  Asim Altamirano MD   metoprolol (LOPRESSOR) 25 MG tablet Take 0.5 tablets (12.5 mg) by mouth 2 times daily Taking at Unknown time  Asim Altamirano MD   zinc sulfate (ZINCATE) 220 MG capsule Take 220 mg by mouth 2 times daily Taking at Unknown time  Reported, Patient   dorzolamide-timolol (COSOPT) 2-0.5 % ophthalmic solution Place 1 drop Into the left eye 2 times daily  Taking at Unknown time  Reported, Patient   travoprost Z, benzalkonium, (TRAVATAN Z) 0.004 % ophthalmic solution Place 1 drop into both eyes every evening  Taking at Unknown time  Reported, Patient         Medication history completed by: Mateusz Bell [facility]

## 2017-04-27 NOTE — PROGRESS NOTES
Check in to the hospital 3C at 0530.  Nothing to eat after midnight, you may have clear liquids up to two hours prior to the scheduled procedure.  You may take your medications with a sip of water the morning of the procedure.  Take a shower, with antibacterial soap, the night before the procedure, and the morning of the procedure.      Diagnosis: Mitral regurgitation  Plan:  1. MitraClip procedure scheduled for May 8.  The hospital will call patient to tell them time of surgery.  2. Medications changes:  Take Aspirin 325 mg, by mouth, the morning of the procedure.    **STOP coumadin 5 days prior to procedure. LAST dose of coumadin will be May 2. NO coumadin starting May 3.     On May 3rd, Tuesday, please start a daily aspirin 325 mg.  Please take this on the morning of your procedure.    If any questions please contact:  Julia Wallace RN  Structural Heart Care Coordinator  [facility]  Office: [phone]  Pager: [phone]

## 2017-04-27 NOTE — PHARMACY - PREOPERATIVE ASSESSMENT CENTER
ANTICOAGULATION DOCUMENTATION - Preoperative Assessment Center (PAC) Pharmacist   Patient seen and interviewed during time of PAC Clinic appointment April 27, 2017.     Based on profile review and patient interview Frannie Roa has been on warfarin for treatment of afib for the past 6-8 weeks. Current dose of 7.5 mg daily.    It is prescribed by Dr Altamirano and managed by the [facility] anticoagulation clinic.  The expected duration of therapy is for at least 3 months after mitral valve repair procedure and then TBD.    Frannie Roa is scheduled for surgery on 5/8 with Dr. Lay and the perioperative anticoagulation plan outlined by Dr. Lay is stop warfarin on 5/3 (last dose on 5/2).  Start aspirin 325 mg daily on 5/3 and take this every day up to and including the day of surgery (see note from Amee Wallace on April 27, 2017 for details).  Patient understood these instructions.  Plan for INR check today, dose adjustments per anticoagulation clinic.  Resumption of anticoagulation postoperatively per Dr. Lay and inpatient surgical team.     This plan may require re-assessment and modification by her primary team in the perioperative setting depending on patients clinical situation.        Mateusz Bell, [facility]  April 27, 2017  2:01 PM

## 2017-04-27 NOTE — MR AVS SNAPSHOT
After Visit Summary   4/27/2017    Frannie Roa    MRN: [id number]           Patient Information     Date Of Birth          1935        Visit Information        Provider Department      4/27/2017 9:00 AM Shailesh Lay MD [facility]        Care Instructions    Your MitraClip procedure will be May 8, at 7:30 a.m.    Check in to the [address] at 5:30 a.m.  Nothing to eat after midnight.  You may have clear liquids up to two hours before your procedure.  You may take your morning medications with a sip of water.   Take a shower, with antibacterial soap, the night before the procedure, and the morning of the procedure.      **STOP coumadin 5 days prior to procedure.  LAST dose of coumadin will be May 2.  NO coumadin starting May 3.    On May 3rd, Tuesday, please start a daily aspirin 325 mg.  Please take this on the morning of your procedure.          If any questions please contact:  Julia Wallace RN  Structural Heart Care Coordinator  [facility]  Office: [phone]  Pager: [phone]          Nursing questions: [phone] option 1 then chose option 3 for the triage nurse, and then ask to speak with Julia.  For emergencies call [phone].    It was a pleasure to see you in clinic.  If you have any questions at all, please feel free to give me a call.      Julia Wallace RN  Structural Heart Care Coordinator   TAVR and MitraClip Programs  [facility]  Office: [phone]    Clinics and Surgery Center  [address] [place] [address]  Cardiology Clinic [address] [address]            Follow-ups after your visit        Your next 10 appointments already scheduled     Apr 27, 2017 10:30 AM CDT   Six Minute Walk with UC PFL 6 MINUTE WALK 1   [facility] Function [facility] ([facility] [place])    [address] [facility] [address]  3rd Floor  [facility] [address] [phone]            Apr 27, 2017  1:30 PM CDT    (Arrive by 1:15 PM)   PAC Pharmacist with  Pac Pharmacist   [facility] ([facility] [place])    [address]  [address]th [facility] [address] [phone]            Apr 27, 2017  2:00 PM CDT   (Arrive by 1:45 PM)   PAC EVALUATION with  Pac Eugene 1   [facility] ([facility] [place])    [address]  [address]th [facility] [address] [phone]            Apr 27, 2017  3:00 PM CDT   (Arrive by 2:45 PM)   PAC Anesthesia Consult with  Pac Anesthesiologist   [facility] Preoperative [facility] [place] ([facility] [place])    [address]  [address]th [facility] [address] [phone]            Apr 27, 2017  3:30 PM CDT   (Arrive by 3:15 PM)   PAC RN ASSESSMENT with Godwin Pac Rn   [facility] Preoperative [facility] [place] ([facility] [place])    [address]  4th [facility] [id number] [phone]              Who to contact     If you have questions or need follow up information about today's clinic visit or your schedule please contact [facility] directly at [phone].  Normal or non-critical lab and imaging results will be communicated to you by [employer], letter or phone within 4 business days after the clinic has received the results. If you do not hear from us within 7 days, please contact the clinic through [employer] or phone. If you have a critical or abnormal lab result, we will notify you by phone as soon as possible.  Submit refill requests through [employer] or call your pharmacy and they will forward the refill request to us. Please allow 3 business days for your refill to be completed.          Additional Information About Your Visit        [employer] Information     [employer] lets you send messages to your doctor, view your test results, renew your prescriptions, schedule appointments and more. To sign up, go to  [email or web address] . Click on \"Log in\" on the left side of the screen, which will take you to the Welcome page. Then click on \"Sign up Now\" on the right side of the page.     You will be asked to enter the access code listed below, as well as some personal information. Please follow the directions to create your username and password.     Your access code is: [email or web address]  Expires: 2017  7:53 PM     Your access code will  in 90 days. If you need help or a new code, please call your [place] clinic or [phone].        Care EveryWhere ID     This is your Care EveryWhere ID. This could be used by other organizations to access your [place] medical records  [id number]        Your Vitals Were     Pulse Height Pulse Oximetry BMI (Body Mass Index)          73 1.6 m (5' 3\") 96% 22 kg/m2         Blood Pressure from Last 3 Encounters:   17 114/73   17 (!) 118/93   17 127/72    Weight from Last 3 Encounters:   17 56.3 kg (124 lb 3.2 oz)   17 55.2 kg (121 lb 11.1 oz)   17 55.8 kg (123 lb)              Today, you had the following     No orders found for display       Primary Care Provider Office Phone # Fax #    Romelia Parker [phone]       [facility] [address]  [place] [address]        Thank you!     Thank you for choosing [facility]  for your care. Our goal is always to provide you with excellent care. Hearing back from our patients is one way we can continue to improve our services. Please take a few minutes to complete the written survey that you may receive in the mail after your visit with us. Thank you!             Your Updated Medication List - Protect others around you: Learn how to safely use, store and throw away your medicines at www.disposemymeds.org.          This list is accurate as of: 17  9:53 AM.  Always use your most recent med list.                   Brand Name Dispense Instructions " for use    dorzolamide-timolol 2-0.5 % ophthalmic solution    COSOPT     1 drop 2 times daily.       FLUZONE HIGH-DOSE 0.5 ML injection   Generic drug:  influenza Vac Split High-Dose      ADM 0.5ML IM UTD       metoprolol 25 MG tablet    LOPRESSOR    90 tablet    Take 0.5 tablets (12.5 mg) by mouth 2 times daily       travoprost Z (benzalkonium) 0.004 % ophthalmic solution    TRAVATAN Z     1 drop every evening.       warfarin 5 MG tablet    COUMADIN    90 tablet    Take 1 tablet (5 mg) by mouth daily       zinc sulfate 220 (50 ZN) MG capsule    ZINCATE     Take 220 mg by mouth 2 times daily

## 2017-04-27 NOTE — ANESTHESIA PREPROCEDURE EVALUATION
Anesthesia Evaluation     . Pt has had prior anesthetic. Type: General and MAC    No history of anesthetic complications          ROS/MED HX    ENT/Pulmonary:     (+), . Other pulmonary disease History of bronchitis in 2/2017.    Neurologic:  - neg neurologic ROS     Cardiovascular:     (+) ----. Taking blood thinners Pt has received instructions: Instructions Given to patient: Planned hold of Coumadin for 5 days, Will take  mg on DOS. CHF etiology: chronic systolic Last EF: 60-65% date: 4/11/17 . . :. dysrhythmias a-fib, . Previous cardiac testing Echodate:4/11/17results:LAKEISHA  Interpretation Summary  Left ventricular cavity is small. LVEF is normal 60-65%.  Right ventricular size and function are normal. Severe biatrial enlargement is present. 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 There is moderate  mitral annular calcification. Severe primary mitral regurgitation is present with flow reversal in three pulmonary veins. The regurgitant jet is directed anteriorly. EROA is 0.48 cm2, vena contracta is 0.8 cm, R Vol is 100 ml. No pericardial effusion is present.     No prior LAKEISHA is available for comparison, but MR severity is worse on current LAKEISHA study compared with TTE from 03/15/2017.     3/15/17 TTE  Interpretation Summary  Global and regional left ventricular function is normal with an EF of 60-65%. The LV filling pattern is c/w grade III diastolic dysfunction and increased left atrial pressure.  Global right ventricular function is normal. Severe left atrial enlargement. Moderate mitral annular calcification. Bileaflet mitral valve prolapse is present. Moderate to severe mitral insufficiency. The ERO is 30 mm2 and the RVol is 54 mL. Pulmonary artery systolic pressure is normal. The inferior vena cava is normal in size with preserved respiratory variability. Estimated mean right atrial pressure is <3 mmHg. No pericardial effusion is present. This study was compared with the study from  10/14/2016. The degree of MR is quantified as described above.  date: results:ECG reviewed date:4/11/17 results:Sinus rhythmCath date: 4/23/17 results:Severe mitral valvular calcification, severe. Mitral valve regurgitation, severe, rheumatic. Secondary pulmonary arterial hypertension, moderate. Diffuse coronary atherosclerosis without significant focal stenosis.          METS/Exercise Tolerance:  >4 METS   Hematologic:  - neg hematologic  ROS       Musculoskeletal:   (+) arthritis, , , other musculoskeletal- Osteoporosis      GI/Hepatic:  - neg GI/hepatic ROS       Renal/Genitourinary:  - ROS Renal section negative       Endo:  - neg endo ROS       Psychiatric:  - neg psychiatric ROS       Infectious Disease:  - neg infectious disease ROS       Malignancy:      - no malignancy   Other:    (+) C-spine cleared: N/A, no H/O Chronic Pain,no other significant disability                    Physical Exam  Normal systems: dental    Airway   Mallampati: II  TM distance: >3 FB  Neck ROM: full    Dental     Cardiovascular   Rhythm and rate: regular and normal  (+) murmur       Pulmonary    breath sounds clear to auscultation    Other findings: For further details of assessment, testing, and physical exam please see H and P completed on same date.           PAC Discussion and Assessment    ASA Classification: 3  Case is suitable for: [place]  Anesthetic techniques and relevant risks discussed: GA  Invasive monitoring and risk discussed: No  Types:   Possibility and Risk of blood transfusion discussed: Yes  NPO instructions given:   Additional anesthetic preparation and risks discussed:   Needs early admission to pre-op area:   Other:     PAC Resident/NP Anesthesia Assessment:  Frannie Roa is a 81 year old female scheduled to undergo percutaneous mitral valve repair on 5/8/17 by Dr. Lay. She has the following specific operative considerations:   - RCRI : 0.9% risk of major adverse cardiac event.   - VTE risk: 0.5%  -  If afib, [id number] score 4.  Risk category High.    - Risk of PONV score = 2.  If > 2, anti-emetic intervention recommended.    No history of problems with anesthesia. Reports that it doesn't take much medication for her.     --Severe myxomatous mitral valve regurgitation. Above procedure now planned.   --Recent development of atrial fibrillation. Medically converted. Maintained on Metoprolol and will take on DOS. Anticoagulated with Coumadin. Patient has been instructed by Cards team to hold Coumadin for 5 days prior to procedure and will take one  mg on DOS. Cardiac evaluation above including cath with diffuse coronary atherosclerosis without significant focal stenosis. Has been very active over time.   --Nonsmoker. History of bronchitis in 2/2017, now resolved. No pulmonary history.   --OA. Osteoporosis.    --Glaucoma.   --Difficult IV stick. Will require Vascular Access.   Type and screen drawn today.    Patient was discussed with Dr Estrada.      Reviewed and Signed by PAC Mid-Level Provider/Resident  Mid-Level Provider/Resident: ELMO Michelle, CNS  Date: 4/27/17  Time: 2:34pm    Attending Anesthesiologist Anesthesia Assessment:  I saw the patient and discussed with the ARI as above.    Final anesthetic plan and recommendations to be made by the attending anesthesiologist on the day of surgery.       Reviewed and Signed by PAC Anesthesiologist  Anesthesiologist: RADHA  Date: 4/27/17  Time: 1416  Pass/Fail: Pass  Disposition:     PAC Pharmacist Assessment:        Pharmacist:   Date:   Time:      Anesthesia Plan      History & Physical Review  History and physical reviewed and following examination; no interval change.    ASA Status:  3 .        Plan for General, ETT and RSI with Intravenous and Propofol induction. Maintenance will be Balanced.    PONV prophylaxis:  Ondansetron (or other 5HT-3)  Additional equipment: Arterial Line, Central Line and 2nd IV Remifentanil, levophed infusion       Postoperative Care  Postoperative pain management:  IV analgesics.      Consents  Anesthetic plan, risks, benefits and alternatives discussed with:  Patient..        Patient was seen and examined.  Agree with assessment and plan above.  Case delayed to keep NPO 6hrs    Robin Arreaga MD

## 2017-04-27 NOTE — PROGRESS NOTES
HPI:     Frannie is a 80yo female who is refd in consultation by Dr. Asim Altamirano MD for consideration of a Mitraclip repair for DMR. The patient presented with Afib and was found to have MR.  This led to subsequent evaluations.  She feels fatigued and dyspneic on accustomed exertion and this has been progressively worsening.  The patient had a coronary angiogram that showed no flow limiting lesions. RHC shows moderate PAH. CO/CI ; 3.1/2.   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       travoprost Z, benzalkonium, (TRAVATAN Z) 0.004 % ophthalmic solution 1 drop every evening.         PAST SURGICAL HISTORY:  Past Surgical History:   Procedure Laterality Date     BIOPSY      breast     ENT SURGERY      T & A     EYE SURGERY      Laser Trabelectomy for glaucoma     ORTHOPEDIC SURGERY      Right ankle cyst removal requiring ORIF     PHACOEMULSIFICATION CLEAR CORNEA W/ STANDARD IOL IMPLANT, ENDOSCOPIC CYCLOPHOTOCOAGULATION, COMBINED  11/28/2011    Procedure:COMBINED PHACOEMULSIFICATION CLEAR CORNEA WITH STANDARD INTRAOCULAR LENS IMPLANT, ENDOSCOPIC CYCLOPHOTOCOAGULATION; LEFT EYE PHACOEMULSIFICATION CLEAR CORNEA WITH STANDARD INTRAOCULAR LENS IMPLANT, ENDOSCOPIC  "CYCLOPHOTOCOAGULATION ; Surgeon:READERELOY; Location: [facility]       ALLERGIES     Allergies   Allergen Reactions     Penicillins      Sulfa Drugs        FAMILY HISTORY:  Family History   Problem Relation Age of Onset     Colon Cancer Mother      Myocardial Infarction Maternal Grandmother      Myocardial Infarction Paternal Grandfather      Other Cancer Sister      Leukemia Daughter        SOCIAL HISTORY:  Social History     Social History     Marital status:      Spouse name: N/A     Number of children: N/A     Years of education: N/A     Social History Main Topics     Smoking status: Never Smoker     Smokeless tobacco: Never Used     Alcohol use 0.0 oz/week     0 Standard drinks or equivalent per week      Comment: 3 glasses wine/week     Drug use: No     Sexual activity: Yes     Partners: Male     Other Topics Concern     Parent/Sibling W/ Cabg, Mi Or Angioplasty Before 65f 55m? No     Social History Narrative       ROS:   Constitutional: No fever, chills, or sweats. No weight gain/loss   ENT: No visual disturbance, ear ache, epistaxis, sore throat  Allergies/Immunologic: Negative.   Respiratory: No cough, hemoptysia  Cardiovascular: As per HPI  GI: No nausea, vomiting, hematemesis, melena, or hematochezia  : No urinary frequency, dysuria, or hematuria  Integument: Negative  Psychiatric: Negative  Neuro: Negative  Endocrinology: Negative   Musculoskeletal: Negative    EXAM:  /73 (BP Location: Right arm, Patient Position: Chair, Cuff Size: Adult Regular)  Pulse 73  Ht 1.6 m (5' 3\")  Wt 56.3 kg (124 lb 3.2 oz)  SpO2 96%  BMI 22 kg/m2  In general, the patient is a pleasant female in no apparent distress.    HEENT: NC/AT.  PERRLA.  EOMI.  Sclerae white, not injected.  Nares clear.  Pharynx without erythema or exudate.  Dentition intact.    Neck: No adenopathy.  No thyromegaly. Carotids +4/4 bilaterally without bruits.  No jugular venous distension.   Heart: RRR. Normal S1, S2 splits " physiologically. 3/6 holosystolic murmur at the apex.The PMI is in the 5th ICS in the midclavicular line. There is no heave.    Lungs: CTA.  No ronchi, wheezes, rales.  No dullness to percussion.   Abdomen: Soft, nontender, nondistended. No organomegaly.  No bruits.   Extremities: No clubbing, cyanosis, or edema.  The pulses are +4/4 at the radial, brachial, femoral, popliteal, DP, and PT sites bilaterally.  No bruits are noted.  Neurologic: Alert and oriented to person/place/time, normal speech, gait and affect  Skin: No petechiae, purpura or rash.    Labs:  LIPID RESULTS:  Lab Results   Component Value Date    CHOL 233 (H) 03/17/2017    HDL 68 03/17/2017     (H) 03/17/2017    TRIG 89 03/17/2017    NHDL 165 (H) 03/17/2017       LIVER ENZYME RESULTS:  Lab Results   Component Value Date    AST 24 10/18/2016    ALT 33 10/18/2016       CBC RESULTS:  Lab Results   Component Value Date    WBC 7.0 04/17/2017    RBC 3.98 04/17/2017    HGB 11.5 (L) 04/17/2017    HCT 36.4 04/17/2017    MCV 92 04/17/2017    MCH 28.9 04/17/2017    MCHC 31.6 04/17/2017    RDW 14.0 04/17/2017     04/17/2017       BMP RESULTS:  Lab Results   Component Value Date     04/17/2017    POTASSIUM 4.2 04/17/2017    CHLORIDE 107 04/17/2017    CO2 25 04/17/2017    ANIONGAP 7 04/17/2017    GLC 91 04/17/2017    BUN 21 04/17/2017    CR 0.57 04/17/2017    GFRESTIMATED >90  Non  GFR Calc   04/17/2017    GFRESTBLACK >90   GFR Calc   04/17/2017    NICOLE 8.3 (L) 04/17/2017        A1C RESULTS:  No results found for: A1C    INR RESULTS:  Lab Results   Component Value Date    INR 2.00 (H) 04/25/2017    INR 1.20 (H) 04/21/2017       Procedures:      Assessment and Plan:     Frannie has severe DMR. The STS risk score for MVR is 4.2%. Due to dense MAC, mitral valve replacement if needed would be challenging and has a higher risk of strokes and paravalvular leak. Discussed at length at the Mitral conference and subsequently  "with Dr. Campos Mosquera MD ( The \"heart team\" ) who is also in agreement that a Mitraclip procedure would be a reasonable option. I spent 30 minutes in a face to face discussion with Frannie and her  about the Mitraclip procedure and its risk and benefits. They are willing to proceeed.    We will schedule the procedure accordingly.      CC  Patient Care Team:  Jd Robbins MD as PCP - General (Internal Medicine)  Debora Whalen APRN CNS as Nurse Coordinator (Clinical Nurse Specialist)  Asim Altamirano MD as MD (Cardiology)  JD ROBBINS  "

## 2017-04-27 NOTE — MR AVS SNAPSHOT
After Visit Summary   4/27/2017    Frannie Roa    MRN: [id number]           Patient Information     Date Of Birth          1935        Visit Information        Provider Department      4/27/2017 3:30 PM Rn, [facility]        Care Instructions      Using an Incentive Spirometer: 5x's/day and 5x's each time, Bring to the Hospital.  Soon after your surgery, a nurse or therapist will teach you breathing exercises. These keep your lungs clear, strengthen your breathing muscles, and help prevent complications.  The exercises include doing a deep-breathing exercise using a device called an incentive spirometer.  To do these exercises, you will breathe in through your mouth and not your nose. The incentive spirometer only works correctly if you breathe in through your mouth.  Four steps to clear lungs     Deep breathing expands the lungs, aids circulation, and helps prevent pneumonia.   1. Exhale normally.    Relax and breathe out.  2. Place your lips tightly around the mouthpiece.    Make sure the device is upright and not tilted.  3. Inhale as much air as you can through the mouthpiece (don't breath through your nose).    Inhale slowly and deeply.    Hold your breath long enough to keep the balls or disk raised for at least 3 seconds.    If you re inhaling too quickly, your device may make a tone. If you hear this tone, inhale more slowly.  4. Repeat the exercise regularly.    Do this exercise every hour while you're awake, or as your health care provider instructs.    You will also be taught coughing exercises and be asked to do them regularly on your own.    6695-3007 [employer]. [address] [place] [address], [place] [address]. All rights reserved. This information is not intended as a substitute for professional medical care. Always follow your healthcare professional's instructions.    AFTER YOUR SURGERY  Breathing exercises   Breathing exercises  help you recover faster. Take deep breaths and let the air out slowly. This will:     Help you wake up after surgery.    Help prevent complications like pneumonia.  Preventing complications will help you go home sooner.   We may give you a breathing device (incentive spirometer) to encourage you to breathe deeply.   Nausea and vomiting   You may feel sick to your stomach after surgery; if so, let your nurse know.    Pain control:  After surgery, you may have pain. Our goal is to help you manage your pain. Pain medicine will help you feel comfortable enough to do activities that will help you heal.  These activities may include breathing exercises, walking and physical therapy.   To help your health care team treat your pain we will ask: 1) If you have pain  2) where it is located 3) describe your pain in your words  Methods of pain control include medications given by mouth, vein or by nerve block for some surgeries.  We may give you a pain control pump that will:  1) Deliver the medicine through a tube placed in your vein  2) Control the amount of medicine you receive  3) Allow you to push a button to deliver a dose of pain medicine  Sequential Compression Device (SCD) or Pneumo Boots:  You may need to wear SCD S on your legs or feet. These are wraps connected to a machine that pumps in air and releases it. The repeated pumping helps prevent blood clots from forming. Preparing for Your Surgery      Name:  Frannie Roa   MRN:  [id number]   :  1935   Today's Date:  2017     Arriving for surgery:  Surgery date:  17  Surgery time: 0730 AM  Arrival time: 0530 AM  Please come to:       [facility] Unit [address] [place] [address]  [id number]       parking is available in front of the hospital from 5:15 am to 8:00 pm    -  Stop at the Information Desk in the lobby    -   Inform the information person that you are here for surgery. An escort to  3c will be provided. If you would not like an escort, please proceed to 3C on the 3rd floor. [phone]     What can I eat or drink?  -  You may have solid food or milk products until 8 hours prior to your surgery. 1030 (2230 )   -  You may have water, apple juice or 7up/Sprite until 2 hours prior to your surgery.    Which medicines can I take?  -  Do NOT take these medications in the morning, the day of surgery:  Stop Coumadin on 05/02/17 and start ASA 04/03/17    -  Please take these medications the day of surgery:  Scheduled meds. (Metoprolol)    How do I prepare myself?  -  Take two showers: one the night before surgery; and one the morning of surgery.         Use Scrubcare or Hibiclens to wash from neck down.  You may use your own shampoo and conditioner. No other hair products.   -  Do NOT use lotion, powder, deodorant, or antiperspirant the day of your surgery.  -  Do NOT wear any makeup, fingernail polish or jewelry.  -  Begin using Incentive Spirometer 1 week prior to surgery.  Use 4 times per day, up to 5-10 breaths each time.  Bring Incentive Spirometer to hospital.  -Do not bring your own medications to the hospital, except for inhalers and eye drops.  -  Bring your ID and insurance card.    Questions or Concerns:  If you have questions or concerns, please call the  Preoperative Assessment Center, Monday-Friday 7AM-7PM:  [phone]                        Follow-ups after your visit        Your next 10 appointments already scheduled     Apr 27, 2017  3:00 PM CDT   (Arrive by 2:45 PM)   PAC Anesthesia Consult with Godwin Pac Anesthesiologist   [facility] ([facility])    [address] [place] [address]  4th Floor  [facility] [address] [phone]            Apr 27, 2017  3:30 PM CDT   (Arrive by 3:15 PM)   PAC RN ASSESSMENT with Godwin Pac Rn   [facility] ([facility])    [address] [place] [address]  Floor  [facility] [id number] [phone]            Apr 27, 2017  4:00 PM CDT   LAB with  LAB    Health Lab ([facility])    [address] [facility]  1st Floor  [facility] [id number] [phone]           Patient must bring picture ID.  Patient should be prepared to give a urine specimen  Please do not eat 10-12 hours before your appointment if you are coming in fasting for labs on lipids, cholesterol, or glucose (sugar).  Pregnant women should follow their Care Team instructions. Water with medications is okay. Do not drink coffee or other fluids.   If you have concerns about taking  your medications, please ask at office or if scheduling via [employer], send a message by clicking on Secure Messaging, Message Your Care Team.            May 08, 2017   Procedure with Shailesh Lay MD   [facility], [place], Same Day Surgery (--)    [address] [facility] [address] [phone]            May 08, 2017  7:30 AM CDT   Percutaneious Mitral Juanita Rep with [id number]   [facility]  [facility] Lab ([facility] [place])    [address] [facility] [address] [phone]              Future tests that were ordered for you today     Open Future Orders        Priority Expected Expires Ordered    INR Routine 4/27/2017 5/27/2017 4/27/2017    ABO/Rh type and screen Routine 4/27/2017 5/27/2017 4/27/2017            Who to contact     Please call your clinic at [phone] to:    Ask questions about your health    Make or cancel appointments    Discuss your medicines    Learn about your test results    Speak to your doctor   If you have compliments or concerns about an experience at your clinic, or if you wish to file a complaint, please contact [facility] Patient Relations at [phone] or email us at [email or web address]         Additional Information About Your Visit        MyChart Information     Chris is an  electronic gateway that provides easy, online access to your medical records. With [employer], you can request a clinic appointment, read your test results, renew a prescription or communicate with your care team.     To sign up for [employer] visit the website at [email or web address]   You will be asked to enter the access code listed below, as well as some personal information. Please follow the directions to create your username and password.     Your access code is: [email or web address]  Expires: 2017  7:53 PM     Your access code will  in 90 days. If you need help or a new code, please contact your [facility] or call [phone] for assistance.        Care EveryWhere ID     This is your Care EveryWhere ID. This could be used by other organizations to access your [facility] medical records  [id number]         Blood Pressure from Last 3 Encounters:   17 117/72   17 114/73   17 (!) 118/93    Weight from Last 3 Encounters:   17 56.2 kg (124 lb)   17 56.3 kg (124 lb 3.2 oz)   17 55.2 kg (121 lb 11.1 oz)              Today, you had the following     No orders found for display         Today's Medication Changes          These changes are accurate as of: 17  2:37 PM.  If you have any questions, ask your nurse or doctor.               These medicines have changed or have updated prescriptions.        Dose/Directions    warfarin 5 MG tablet   Commonly known as:  COUMADIN   This may have changed:  how much to take   Used for:  Myxomatous mitral valve regurgitation, Chronic systolic congestive heart failure (H), Paroxysmal atrial fibrillation (H)        Dose:  5 mg   Take 1 tablet (5 mg) by mouth daily   Quantity:  90 tablet   Refills:  3                Primary Care Provider Office Phone # Fax #    Romelia Parker [phone]       [facility] [address] [address]  [facility] [address]        Thank you!      Thank you for choosing [facility] PREOPERAT[facility]  for your care. Our goal is always to provide you with excellent care. Hearing back from our patients is one way we can continue to improve our services. Please take a few minutes to complete the written survey that you may receive in the mail after your visit with us. Thank you!             Your Updated Medication List - Protect others around you: Learn how to safely use, store and throw away your medicines at www.disposemymeds.org.          This list is accurate as of: 4/27/17  2:37 PM.  Always use your most recent med list.                   Brand Name Dispense Instructions for use    ASPIRIN PO   Start taking on:  5/3/2017      Take 81 mg by mouth daily       dorzolamide-timolol 2-0.5 % ophthalmic solution    COSOPT     Place 1 drop Into the left eye 2 times daily       metoprolol 25 MG tablet    LOPRESSOR    90 tablet    Take 0.5 tablets (12.5 mg) by mouth 2 times daily       travoprost Z (benzalkonium) 0.004 % ophthalmic solution    TRAVATAN Z     Place 1 drop into both eyes every evening       warfarin 5 MG tablet    COUMADIN    90 tablet    Take 1 tablet (5 mg) by mouth daily       zinc sulfate 220 (50 ZN) MG capsule    ZINCATE     Take 220 mg by mouth 2 times daily

## 2017-04-27 NOTE — PATIENT INSTRUCTIONS
Using an Incentive Spirometer: 5x's/day and 5x's each time, Bring to the Hospital.  Soon after your surgery, a nurse or therapist will teach you breathing exercises. These keep your lungs clear, strengthen your breathing muscles, and help prevent complications.  The exercises include doing a deep-breathing exercise using a device called an incentive spirometer.  To do these exercises, you will breathe in through your mouth and not your nose. The incentive spirometer only works correctly if you breathe in through your mouth.  Four steps to clear lungs     Deep breathing expands the lungs, aids circulation, and helps prevent pneumonia.   1. Exhale normally.    Relax and breathe out.  2. Place your lips tightly around the mouthpiece.    Make sure the device is upright and not tilted.  3. Inhale as much air as you can through the mouthpiece (don't breath through your nose).    Inhale slowly and deeply.    Hold your breath long enough to keep the balls or disk raised for at least 3 seconds.    If you re inhaling too quickly, your device may make a tone. If you hear this tone, inhale more slowly.  4. Repeat the exercise regularly.    Do this exercise every hour while you're awake, or as your health care provider instructs.    You will also be taught coughing exercises and be asked to do them regularly on your own.    7573-5867 [employer]. [address] [place] [address], [place] [address]. All rights reserved. This information is not intended as a substitute for professional medical care. Always follow your healthcare professional's instructions.    AFTER YOUR SURGERY  Breathing exercises   Breathing exercises help you recover faster. Take deep breaths and let the air out slowly. This will:     Help you wake up after surgery.    Help prevent complications like pneumonia.  Preventing complications will help you go home sooner.   We may give you a breathing device (incentive spirometer) to encourage you to breathe  deeply.   Nausea and vomiting   You may feel sick to your stomach after surgery; if so, let your nurse know.    Pain control:  After surgery, you may have pain. Our goal is to help you manage your pain. Pain medicine will help you feel comfortable enough to do activities that will help you heal.  These activities may include breathing exercises, walking and physical therapy.   To help your health care team treat your pain we will ask: 1) If you have pain  2) where it is located 3) describe your pain in your words  Methods of pain control include medications given by mouth, vein or by nerve block for some surgeries.  We may give you a pain control pump that will:  1) Deliver the medicine through a tube placed in your vein  2) Control the amount of medicine you receive  3) Allow you to push a button to deliver a dose of pain medicine  Sequential Compression Device (SCD) or Pneumo Boots:  You may need to wear SCD S on your legs or feet. These are wraps connected to a machine that pumps in air and releases it. The repeated pumping helps prevent blood clots from forming. Preparing for Your Surgery      Name:  Frannie Roa   MRN:  [id number]   :  1935   Today's Date:  2017     Arriving for surgery:  Surgery date:  17  Surgery time: 0730 AM  Arrival time: 0530 AM  Please come to:       [facility] Unit [address] [place] [address]  [address]    -   parking is available in front of the hospital from 5:15 am to 8:00 pm    -  Stop at the Information Desk in the lobby    -   Inform the information person that you are here for surgery. An escort to  will be provided. If you would not like an escort, please proceed to 3C on the 3rd floor. [phone]     What can I eat or drink?  -  You may have solid food or milk products until 8 hours prior to your surgery. 1030 (8360 )   -  You may have water, apple juice or 7up/Sprite until 2 hours prior to your  surgery.    Which medicines can I take?  -  Do NOT take these medications in the morning, the day of surgery:  Stop Coumadin on 05/02/17 and start ASA 04/03/17    -  Please take these medications the day of surgery:  Scheduled meds. (Metoprolol)    How do I prepare myself?  -  Take two showers: one the night before surgery; and one the morning of surgery.         Use Scrubcare or Hibiclens to wash from neck down.  You may use your own shampoo and conditioner. No other hair products.   -  Do NOT use lotion, powder, deodorant, or antiperspirant the day of your surgery.  -  Do NOT wear any makeup, fingernail polish or jewelry.  -  Begin using Incentive Spirometer 1 week prior to surgery.  Use 4 times per day, up to 5-10 breaths each time.  Bring Incentive Spirometer to hospital.  -Do not bring your own medications to the hospital, except for inhalers and eye drops.  -  Bring your ID and insurance card.    Questions or Concerns:  If you have questions or concerns, please call the  Preoperative Assessment Center, Monday-Friday 7AM-7PM:  [phone]

## 2017-04-27 NOTE — NURSING NOTE
Chief Complaint   Patient presents with     New Patient     NO EK yo FM, here for evaluation of her severe mitral valve regurgitation.     ZACHARIAH Saenz      Med Reconcile: Reviewed and verified all current medications with the patient. The updated medication list was printed and given to the patient.  Return Appointment: Patient given instructions regarding scheduling next clinic visit. Patient demonstrated understanding of this information and agreed to call with further questions or concerns.  Patient stated she understood all health information given and agreed to call with further questions or concerns.

## 2017-04-27 NOTE — PATIENT INSTRUCTIONS
Your MitraClip procedure will be May 8, at 7:30 a.m.    Check in to the [address] at 5:30 a.m.  Nothing to eat after midnight.  You may have clear liquids up to two hours before your procedure.  You may take your morning medications with a sip of water.   Take a shower, with antibacterial soap, the night before the procedure, and the morning of the procedure.      **STOP coumadin 5 days prior to procedure.  LAST dose of coumadin will be May 2.  NO coumadin starting May 3.    On May 3rd, Tuesday, please start a daily aspirin 325 mg.  Please take this on the morning of your procedure.          If any questions please contact:  Julia Wallace RN  Structural Heart Care Coordinator  [facility]  Office: [phone]  Pager: [phone]          Nursing questions: [phone] option 1 then chose option 3 for the triage nurse, and then ask to speak with Julia.  For emergencies call 911.    It was a pleasure to see you in clinic.  If you have any questions at all, please feel free to give me a call.      Julia Wallace RN  Structural Heart Care Coordinator   TAVR and MitraClip Programs  [facility]  Office: [phone]    Clinics and Surgery Center  [address] [place] [address]  Cardiology Clinic [address]  [place] [address]

## 2017-05-04 ENCOUNTER — CARE COORDINATION (OUTPATIENT)
Dept: CARDIOLOGY | Facility: CLINIC | Age: 82
End: 2017-05-04

## 2017-05-04 NOTE — PROGRESS NOTES
Patient called to clarify asa dosing.  Note from Julia TUCKER RN said to start 325 mg asa on 5/3/17 and take the morning of procedure.  Patient states that she was told at appointment with Dr. Lay that 81 mg asa was ok.  Reviewed with patient that she should follow the instructions from Julia TUCKER as she is the procedure care coordinator.  Patient will start asa 325 mg today.  She would like Julia to give her a call to clarify.  I will forward for review.  Patient states understanding and agrees to call with questions or concerns.

## 2017-05-04 NOTE — PROGRESS NOTES
Frannie called with questions regarding the dosing of her ASA: should it be 81 mg, or 325 mg.  Dr. Lay stated that either 81 mg, or the 325 mg after stopping her coumadin is acceptable.  Frannie states that she is comfortable with the dose of 81 mg.  It was stressed to her that she should take 325 mg the morning of the MitraClip procedure.  She verbalized understanding to this information.

## 2017-05-08 ENCOUNTER — HOSPITAL ENCOUNTER (INPATIENT)
Facility: CLINIC | Age: 82
LOS: 1 days | Discharge: HOME | DRG: 229 | End: 2017-05-09
Attending: INTERNAL MEDICINE | Admitting: INTERNAL MEDICINE
Payer: MEDICARE

## 2017-05-08 ENCOUNTER — ANESTHESIA (OUTPATIENT)
Dept: SURGERY | Facility: CLINIC | Age: 82
DRG: 229 | End: 2017-05-08
Payer: MEDICARE

## 2017-05-08 ENCOUNTER — CARE COORDINATION (OUTPATIENT)
Dept: CARDIOLOGY | Facility: CLINIC | Age: 82
End: 2017-05-08

## 2017-05-08 ENCOUNTER — HOSPITAL ENCOUNTER (INPATIENT)
Dept: CARDIOLOGY | Facility: CLINIC | Age: 82
Setting detail: SURGERY ADMIT
DRG: 229 | End: 2017-05-08
Attending: INTERNAL MEDICINE | Admitting: INTERNAL MEDICINE
Payer: MEDICARE

## 2017-05-08 ENCOUNTER — APPOINTMENT (OUTPATIENT)
Dept: CARDIOLOGY | Facility: CLINIC | Age: 82
DRG: 229 | End: 2017-05-08
Attending: INTERNAL MEDICINE
Payer: MEDICARE

## 2017-05-08 DIAGNOSIS — I34.0 MYXOMATOUS MITRAL VALVE REGURGITATION: Primary | ICD-10-CM

## 2017-05-08 DIAGNOSIS — I34.0 MITRAL VALVE INSUFFICIENCY, UNSPECIFIED ETIOLOGY: Primary | ICD-10-CM

## 2017-05-08 DIAGNOSIS — I34.0 MITRAL VALVE INSUFFICIENCY, UNSPECIFIED ETIOLOGY: ICD-10-CM

## 2017-05-08 DIAGNOSIS — Z98.890 S/P MITRAL VALVE REPAIR: ICD-10-CM

## 2017-05-08 LAB
ABO + RH BLD: NORMAL
ABO + RH BLD: NORMAL
ALBUMIN SERPL-MCNC: 2.7 G/DL (ref 3.4–5)
ALP SERPL-CCNC: 62 U/L (ref 40–150)
ALT SERPL W P-5'-P-CCNC: 29 U/L (ref 0–50)
ANION GAP SERPL CALCULATED.3IONS-SCNC: 8 MMOL/L (ref 3–14)
ANION GAP SERPL CALCULATED.3IONS-SCNC: 8 MMOL/L (ref 3–14)
AST SERPL W P-5'-P-CCNC: 19 U/L (ref 0–45)
BASE DEFICIT BLDA-SCNC: 0.9 MMOL/L
BASE DEFICIT BLDA-SCNC: 2.6 MMOL/L
BILIRUB SERPL-MCNC: 0.7 MG/DL (ref 0.2–1.3)
BLD GP AB SCN SERPL QL: NORMAL
BLOOD BANK CMNT PATIENT-IMP: NORMAL
BLOOD BANK CMNT PATIENT-IMP: NORMAL
BUN SERPL-MCNC: 14 MG/DL (ref 7–30)
BUN SERPL-MCNC: 19 MG/DL (ref 7–30)
CA-I BLD-MCNC: 4.2 MG/DL (ref 4.4–5.2)
CALCIUM SERPL-MCNC: 7.2 MG/DL (ref 8.5–10.1)
CALCIUM SERPL-MCNC: 8.5 MG/DL (ref 8.5–10.1)
CHLORIDE SERPL-SCNC: 105 MMOL/L (ref 94–109)
CHLORIDE SERPL-SCNC: 111 MMOL/L (ref 94–109)
CK SERPL-CCNC: 46 U/L (ref 30–225)
CO2 SERPL-SCNC: 23 MMOL/L (ref 20–32)
CO2 SERPL-SCNC: 26 MMOL/L (ref 20–32)
CREAT SERPL-MCNC: 0.52 MG/DL (ref 0.52–1.04)
CREAT SERPL-MCNC: 0.66 MG/DL (ref 0.52–1.04)
ERYTHROCYTE [DISTWIDTH] IN BLOOD BY AUTOMATED COUNT: 14 % (ref 10–15)
ERYTHROCYTE [DISTWIDTH] IN BLOOD BY AUTOMATED COUNT: 14.1 % (ref 10–15)
GFR SERPL CREATININE-BSD FRML MDRD: 86 ML/MIN/1.7M2
GFR SERPL CREATININE-BSD FRML MDRD: ABNORMAL ML/MIN/1.7M2
GLUCOSE BLD-MCNC: 93 MG/DL (ref 70–99)
GLUCOSE SERPL-MCNC: 101 MG/DL (ref 70–99)
GLUCOSE SERPL-MCNC: 85 MG/DL (ref 70–99)
HCO3 BLD-SCNC: 22 MMOL/L (ref 21–28)
HCO3 BLD-SCNC: 24 MMOL/L (ref 21–28)
HCT VFR BLD AUTO: 33.2 % (ref 35–47)
HCT VFR BLD AUTO: 38.1 % (ref 35–47)
HGB BLD-MCNC: 10.3 G/DL (ref 11.7–15.7)
HGB BLD-MCNC: 10.9 G/DL (ref 11.7–15.7)
HGB BLD-MCNC: 12.3 G/DL (ref 11.7–15.7)
INR PPP: 1.02 (ref 0.86–1.14)
INTERPRETATION ECG - MUSE: NORMAL
KCT BLD-ACNC: 147 SEC (ref 105–167)
KCT BLD-ACNC: 208 SEC (ref 105–167)
KCT BLD-ACNC: 246 SEC (ref 105–167)
KCT BLD-ACNC: 276 SEC (ref 105–167)
KCT BLD-ACNC: 284 SEC (ref 105–167)
KCT BLD-ACNC: 314 SEC (ref 105–167)
MAGNESIUM SERPL-MCNC: 2.1 MG/DL (ref 1.6–2.3)
MCH RBC QN AUTO: 29.4 PG (ref 26.5–33)
MCH RBC QN AUTO: 29.5 PG (ref 26.5–33)
MCHC RBC AUTO-ENTMCNC: 32.3 G/DL (ref 31.5–36.5)
MCHC RBC AUTO-ENTMCNC: 32.8 G/DL (ref 31.5–36.5)
MCV RBC AUTO: 90 FL (ref 78–100)
MCV RBC AUTO: 91 FL (ref 78–100)
MRSA DNA SPEC QL NAA+PROBE: NORMAL
O2/TOTAL GAS SETTING VFR VENT: 60 %
O2/TOTAL GAS SETTING VFR VENT: ABNORMAL %
OXYHGB MFR BLD: 98 % (ref 92–100)
PCO2 BLD: 35 MM HG (ref 35–45)
PCO2 BLD: 40 MM HG (ref 35–45)
PH BLD: 7.39 PH (ref 7.35–7.45)
PH BLD: 7.4 PH (ref 7.35–7.45)
PHOSPHATE SERPL-MCNC: 3.6 MG/DL (ref 2.5–4.5)
PLATELET # BLD AUTO: 215 10E9/L (ref 150–450)
PLATELET # BLD AUTO: 259 10E9/L (ref 150–450)
PO2 BLD: 173 MM HG (ref 80–105)
PO2 BLD: 197 MM HG (ref 80–105)
POTASSIUM BLD-SCNC: 3.6 MMOL/L (ref 3.4–5.3)
POTASSIUM SERPL-SCNC: 3.8 MMOL/L (ref 3.4–5.3)
POTASSIUM SERPL-SCNC: 4 MMOL/L (ref 3.4–5.3)
PROT SERPL-MCNC: 5.5 G/DL (ref 6.8–8.8)
RBC # BLD AUTO: 3.7 10E12/L (ref 3.8–5.2)
RBC # BLD AUTO: 4.19 10E12/L (ref 3.8–5.2)
SODIUM BLD-SCNC: 140 MMOL/L (ref 133–144)
SODIUM SERPL-SCNC: 139 MMOL/L (ref 133–144)
SODIUM SERPL-SCNC: 141 MMOL/L (ref 133–144)
SPECIMEN EXP DATE BLD: NORMAL
SPECIMEN SOURCE: NORMAL
WBC # BLD AUTO: 5.7 10E9/L (ref 4–11)
WBC # BLD AUTO: 6.3 10E9/L (ref 4–11)

## 2017-05-08 PROCEDURE — 71000014 ZZH RECOVERY PHASE 1 LEVEL 2 FIRST HR: Performed by: INTERNAL MEDICINE

## 2017-05-08 PROCEDURE — 85347 COAGULATION TIME ACTIVATED: CPT

## 2017-05-08 PROCEDURE — C1760 CLOSURE DEV, VASC: HCPCS

## 2017-05-08 PROCEDURE — 82803 BLOOD GASES ANY COMBINATION: CPT | Performed by: ANESTHESIOLOGY

## 2017-05-08 PROCEDURE — 20000004 ZZH R&B ICU UMMC

## 2017-05-08 PROCEDURE — 82550 ASSAY OF CK (CPK): CPT | Performed by: INTERNAL MEDICINE

## 2017-05-08 PROCEDURE — 85027 COMPLETE CBC AUTOMATED: CPT | Performed by: INTERNAL MEDICINE

## 2017-05-08 PROCEDURE — S0077 INJECTION, CLINDAMYCIN PHOSP: HCPCS | Performed by: INTERNAL MEDICINE

## 2017-05-08 PROCEDURE — 27211157 ZZH NEEDLE BRF TRANSEPTAL CR15

## 2017-05-08 PROCEDURE — 25000132 ZZH RX MED GY IP 250 OP 250 PS 637: Mod: GY | Performed by: INTERNAL MEDICINE

## 2017-05-08 PROCEDURE — 37000009 ZZH ANESTHESIA TECHNICAL FEE, EACH ADDTL 15 MIN: Performed by: INTERNAL MEDICINE

## 2017-05-08 PROCEDURE — 25000565 ZZH ISOFLURANE, EA 15 MIN: Performed by: INTERNAL MEDICINE

## 2017-05-08 PROCEDURE — 02UG3JZ SUPPLEMENT MITRAL VALVE WITH SYNTHETIC SUBSTITUTE, PERCUTANEOUS APPROACH: ICD-10-PCS | Performed by: INTERNAL MEDICINE

## 2017-05-08 PROCEDURE — 27210742 ZZH CATH CR1

## 2017-05-08 PROCEDURE — A9270 NON-COVERED ITEM OR SERVICE: HCPCS | Mod: GY | Performed by: INTERNAL MEDICINE

## 2017-05-08 PROCEDURE — C9399 UNCLASSIFIED DRUGS OR BIOLOG: HCPCS | Performed by: NURSE ANESTHETIST, CERTIFIED REGISTERED

## 2017-05-08 PROCEDURE — 25000128 H RX IP 250 OP 636: Performed by: NURSE ANESTHETIST, CERTIFIED REGISTERED

## 2017-05-08 PROCEDURE — 25000128 H RX IP 250 OP 636: Performed by: INTERNAL MEDICINE

## 2017-05-08 PROCEDURE — 40000014 ZZH STATISTIC ARTERIAL MONITORING DAILY

## 2017-05-08 PROCEDURE — 93355 ECHO TRANSESOPHAGEAL (TEE): CPT | Performed by: INTERNAL MEDICINE

## 2017-05-08 PROCEDURE — 85610 PROTHROMBIN TIME: CPT | Performed by: INTERNAL MEDICINE

## 2017-05-08 PROCEDURE — 27210787 ZZH MANIFOLD CR2

## 2017-05-08 PROCEDURE — 93355 ECHO TRANSESOPHAGEAL (TEE): CPT

## 2017-05-08 PROCEDURE — 25000125 ZZHC RX 250: Performed by: INTERNAL MEDICINE

## 2017-05-08 PROCEDURE — C1769 GUIDE WIRE: HCPCS

## 2017-05-08 PROCEDURE — 25000125 ZZHC RX 250: Performed by: NURSE ANESTHETIST, CERTIFIED REGISTERED

## 2017-05-08 PROCEDURE — 93005 ELECTROCARDIOGRAM TRACING: CPT

## 2017-05-08 PROCEDURE — 27211089 ZZH KIT ACIST INJECTOR CR3

## 2017-05-08 PROCEDURE — 33418 REPAIR TCAT MITRAL VALVE: CPT

## 2017-05-08 PROCEDURE — 25800025 ZZH RX 258: Performed by: NURSE ANESTHETIST, CERTIFIED REGISTERED

## 2017-05-08 PROCEDURE — 40000170 ZZH STATISTIC PRE-PROCEDURE ASSESSMENT II: Performed by: INTERNAL MEDICINE

## 2017-05-08 PROCEDURE — 36415 COLL VENOUS BLD VENIPUNCTURE: CPT | Performed by: INTERNAL MEDICINE

## 2017-05-08 PROCEDURE — 80053 COMPREHEN METABOLIC PANEL: CPT | Performed by: INTERNAL MEDICINE

## 2017-05-08 PROCEDURE — 27210989 ZZH ACCESS EP PROC CR11

## 2017-05-08 PROCEDURE — B245ZZ4 ULTRASONOGRAPHY OF LEFT HEART, TRANSESOPHAGEAL: ICD-10-PCS | Performed by: INTERNAL MEDICINE

## 2017-05-08 PROCEDURE — 27210807 ZZH SHEATH CR6

## 2017-05-08 PROCEDURE — 27210946 ZZH KIT HC TOTES DISP CR8

## 2017-05-08 PROCEDURE — 84295 ASSAY OF SERUM SODIUM: CPT | Performed by: ANESTHESIOLOGY

## 2017-05-08 PROCEDURE — 82947 ASSAY GLUCOSE BLOOD QUANT: CPT | Performed by: ANESTHESIOLOGY

## 2017-05-08 PROCEDURE — 33418 REPAIR TCAT MITRAL VALVE: CPT | Mod: 62 | Performed by: INTERNAL MEDICINE

## 2017-05-08 PROCEDURE — 37000008 ZZH ANESTHESIA TECHNICAL FEE, 1ST 30 MIN: Performed by: INTERNAL MEDICINE

## 2017-05-08 PROCEDURE — C1894 INTRO/SHEATH, NON-LASER: HCPCS

## 2017-05-08 PROCEDURE — 83735 ASSAY OF MAGNESIUM: CPT | Performed by: INTERNAL MEDICINE

## 2017-05-08 PROCEDURE — 84132 ASSAY OF SERUM POTASSIUM: CPT | Performed by: ANESTHESIOLOGY

## 2017-05-08 PROCEDURE — 82805 BLOOD GASES W/O2 SATURATION: CPT | Performed by: INTERNAL MEDICINE

## 2017-05-08 PROCEDURE — 87640 STAPH A DNA AMP PROBE: CPT | Performed by: INTERNAL MEDICINE

## 2017-05-08 PROCEDURE — 93010 ELECTROCARDIOGRAM REPORT: CPT | Mod: 76 | Performed by: INTERNAL MEDICINE

## 2017-05-08 PROCEDURE — 80048 BASIC METABOLIC PNL TOTAL CA: CPT | Performed by: INTERNAL MEDICINE

## 2017-05-08 PROCEDURE — 87641 MR-STAPH DNA AMP PROBE: CPT | Performed by: INTERNAL MEDICINE

## 2017-05-08 PROCEDURE — 84100 ASSAY OF PHOSPHORUS: CPT | Performed by: INTERNAL MEDICINE

## 2017-05-08 PROCEDURE — 40000275 ZZH STATISTIC RCP TIME EA 10 MIN

## 2017-05-08 PROCEDURE — 27810378 ZZH KIT, MITRACLIP & DELIVERY SYSTEM

## 2017-05-08 PROCEDURE — 82330 ASSAY OF CALCIUM: CPT | Performed by: ANESTHESIOLOGY

## 2017-05-08 RX ORDER — ONDANSETRON 2 MG/ML
4 INJECTION INTRAMUSCULAR; INTRAVENOUS EVERY 6 HOURS PRN
Status: DISCONTINUED | OUTPATIENT
Start: 2017-05-08 | End: 2017-05-09 | Stop reason: HOSPADM

## 2017-05-08 RX ORDER — FENTANYL CITRATE 50 UG/ML
INJECTION, SOLUTION INTRAMUSCULAR; INTRAVENOUS PRN
Status: DISCONTINUED | OUTPATIENT
Start: 2017-05-08 | End: 2017-05-08

## 2017-05-08 RX ORDER — ATROPINE SULFATE 0.1 MG/ML
0.5 INJECTION INTRAVENOUS EVERY 5 MIN PRN
Status: ACTIVE | OUTPATIENT
Start: 2017-05-08 | End: 2017-05-09

## 2017-05-08 RX ORDER — SODIUM CHLORIDE, SODIUM LACTATE, POTASSIUM CHLORIDE, CALCIUM CHLORIDE 600; 310; 30; 20 MG/100ML; MG/100ML; MG/100ML; MG/100ML
INJECTION, SOLUTION INTRAVENOUS CONTINUOUS PRN
Status: DISCONTINUED | OUTPATIENT
Start: 2017-05-08 | End: 2017-05-08

## 2017-05-08 RX ORDER — POTASSIUM CL/LIDO/0.9 % NACL 10MEQ/0.1L
10 INTRAVENOUS SOLUTION, PIGGYBACK (ML) INTRAVENOUS
Status: DISCONTINUED | OUTPATIENT
Start: 2017-05-08 | End: 2017-05-09 | Stop reason: HOSPADM

## 2017-05-08 RX ORDER — FLUMAZENIL 0.1 MG/ML
0.2 INJECTION, SOLUTION INTRAVENOUS
Status: ACTIVE | OUTPATIENT
Start: 2017-05-08 | End: 2017-05-09

## 2017-05-08 RX ORDER — LIDOCAINE 40 MG/G
CREAM TOPICAL
Status: DISCONTINUED | OUTPATIENT
Start: 2017-05-08 | End: 2017-05-08 | Stop reason: HOSPADM

## 2017-05-08 RX ORDER — ONDANSETRON 4 MG/1
4 TABLET, ORALLY DISINTEGRATING ORAL EVERY 30 MIN PRN
Status: DISCONTINUED | OUTPATIENT
Start: 2017-05-08 | End: 2017-05-09

## 2017-05-08 RX ORDER — POTASSIUM CHLORIDE 29.8 MG/ML
20 INJECTION INTRAVENOUS
Status: DISCONTINUED | OUTPATIENT
Start: 2017-05-08 | End: 2017-05-09 | Stop reason: HOSPADM

## 2017-05-08 RX ORDER — POTASSIUM CHLORIDE 7.45 MG/ML
10 INJECTION INTRAVENOUS
Status: DISCONTINUED | OUTPATIENT
Start: 2017-05-08 | End: 2017-05-09 | Stop reason: HOSPADM

## 2017-05-08 RX ORDER — IOPAMIDOL 755 MG/ML
40 INJECTION, SOLUTION INTRAVASCULAR ONCE
Status: COMPLETED | OUTPATIENT
Start: 2017-05-08 | End: 2017-05-08

## 2017-05-08 RX ORDER — DORZOLAMIDE HYDROCHLORIDE AND TIMOLOL MALEATE 20; 5 MG/ML; MG/ML
1 SOLUTION/ DROPS OPHTHALMIC 2 TIMES DAILY
Status: DISCONTINUED | OUTPATIENT
Start: 2017-05-08 | End: 2017-05-09 | Stop reason: HOSPADM

## 2017-05-08 RX ORDER — EPHEDRINE SULFATE 50 MG/ML
INJECTION, SOLUTION INTRAMUSCULAR; INTRAVENOUS; SUBCUTANEOUS PRN
Status: DISCONTINUED | OUTPATIENT
Start: 2017-05-08 | End: 2017-05-08

## 2017-05-08 RX ORDER — POTASSIUM CHLORIDE 1.5 G/1.58G
20-40 POWDER, FOR SOLUTION ORAL
Status: DISCONTINUED | OUTPATIENT
Start: 2017-05-08 | End: 2017-05-09 | Stop reason: HOSPADM

## 2017-05-08 RX ORDER — NALOXONE HYDROCHLORIDE 0.4 MG/ML
.1-.4 INJECTION, SOLUTION INTRAMUSCULAR; INTRAVENOUS; SUBCUTANEOUS
Status: DISCONTINUED | OUTPATIENT
Start: 2017-05-08 | End: 2017-05-08 | Stop reason: HOSPADM

## 2017-05-08 RX ORDER — ONDANSETRON 2 MG/ML
INJECTION INTRAMUSCULAR; INTRAVENOUS PRN
Status: DISCONTINUED | OUTPATIENT
Start: 2017-05-08 | End: 2017-05-08

## 2017-05-08 RX ORDER — HEPARIN SODIUM 1000 [USP'U]/ML
INJECTION, SOLUTION INTRAVENOUS; SUBCUTANEOUS PRN
Status: DISCONTINUED | OUTPATIENT
Start: 2017-05-08 | End: 2017-05-08

## 2017-05-08 RX ORDER — ASPIRIN 81 MG/1
81 TABLET ORAL DAILY
Status: DISCONTINUED | OUTPATIENT
Start: 2017-05-09 | End: 2017-05-09 | Stop reason: HOSPADM

## 2017-05-08 RX ORDER — HYDRALAZINE HYDROCHLORIDE 20 MG/ML
10 INJECTION INTRAMUSCULAR; INTRAVENOUS
Status: DISCONTINUED | OUTPATIENT
Start: 2017-05-08 | End: 2017-05-09 | Stop reason: HOSPADM

## 2017-05-08 RX ORDER — LIDOCAINE HYDROCHLORIDE 20 MG/ML
INJECTION, SOLUTION INFILTRATION; PERINEURAL PRN
Status: DISCONTINUED | OUTPATIENT
Start: 2017-05-08 | End: 2017-05-08

## 2017-05-08 RX ORDER — WARFARIN SODIUM 5 MG/1
5 TABLET ORAL
Status: COMPLETED | OUTPATIENT
Start: 2017-05-08 | End: 2017-05-08

## 2017-05-08 RX ORDER — ACETAMINOPHEN 325 MG/1
325-650 TABLET ORAL EVERY 4 HOURS PRN
Status: DISCONTINUED | OUTPATIENT
Start: 2017-05-08 | End: 2017-05-09 | Stop reason: HOSPADM

## 2017-05-08 RX ORDER — MAGNESIUM SULFATE HEPTAHYDRATE 40 MG/ML
4 INJECTION, SOLUTION INTRAVENOUS EVERY 4 HOURS PRN
Status: DISCONTINUED | OUTPATIENT
Start: 2017-05-08 | End: 2017-05-09 | Stop reason: HOSPADM

## 2017-05-08 RX ORDER — PROTAMINE SULFATE 10 MG/ML
INJECTION, SOLUTION INTRAVENOUS PRN
Status: DISCONTINUED | OUTPATIENT
Start: 2017-05-08 | End: 2017-05-08

## 2017-05-08 RX ORDER — LIDOCAINE 40 MG/G
CREAM TOPICAL
Status: DISCONTINUED | OUTPATIENT
Start: 2017-05-08 | End: 2017-05-09 | Stop reason: HOSPADM

## 2017-05-08 RX ORDER — POTASSIUM CHLORIDE 750 MG/1
20-40 TABLET, EXTENDED RELEASE ORAL
Status: DISCONTINUED | OUTPATIENT
Start: 2017-05-08 | End: 2017-05-09 | Stop reason: HOSPADM

## 2017-05-08 RX ORDER — SODIUM CHLORIDE, SODIUM LACTATE, POTASSIUM CHLORIDE, CALCIUM CHLORIDE 600; 310; 30; 20 MG/100ML; MG/100ML; MG/100ML; MG/100ML
INJECTION, SOLUTION INTRAVENOUS CONTINUOUS
Status: DISCONTINUED | OUTPATIENT
Start: 2017-05-08 | End: 2017-05-09

## 2017-05-08 RX ORDER — NITROGLYCERIN 0.4 MG/1
0.4 TABLET SUBLINGUAL EVERY 5 MIN PRN
Status: DISCONTINUED | OUTPATIENT
Start: 2017-05-08 | End: 2017-05-09 | Stop reason: HOSPADM

## 2017-05-08 RX ORDER — SODIUM CHLORIDE 9 MG/ML
INJECTION, SOLUTION INTRAVENOUS CONTINUOUS
Status: DISCONTINUED | OUTPATIENT
Start: 2017-05-08 | End: 2017-05-08 | Stop reason: HOSPADM

## 2017-05-08 RX ORDER — ASPIRIN 81 MG/1
81 TABLET, CHEWABLE ORAL DAILY
Status: DISCONTINUED | OUTPATIENT
Start: 2017-05-08 | End: 2017-05-08

## 2017-05-08 RX ORDER — FENTANYL CITRATE 50 UG/ML
25-50 INJECTION, SOLUTION INTRAMUSCULAR; INTRAVENOUS
Status: ACTIVE | OUTPATIENT
Start: 2017-05-08 | End: 2017-05-09

## 2017-05-08 RX ORDER — CLINDAMYCIN PHOSPHATE 900 MG/50ML
900 INJECTION, SOLUTION INTRAVENOUS
Status: COMPLETED | OUTPATIENT
Start: 2017-05-08 | End: 2017-05-08

## 2017-05-08 RX ORDER — TRAVOPROST OPHTHALMIC SOLUTION 0.04 MG/ML
1 SOLUTION OPHTHALMIC EVERY EVENING
Status: DISCONTINUED | OUTPATIENT
Start: 2017-05-08 | End: 2017-05-08

## 2017-05-08 RX ORDER — ONDANSETRON 2 MG/ML
4 INJECTION INTRAMUSCULAR; INTRAVENOUS EVERY 30 MIN PRN
Status: DISCONTINUED | OUTPATIENT
Start: 2017-05-08 | End: 2017-05-09

## 2017-05-08 RX ORDER — NALOXONE HYDROCHLORIDE 0.4 MG/ML
.2-.4 INJECTION, SOLUTION INTRAMUSCULAR; INTRAVENOUS; SUBCUTANEOUS
Status: ACTIVE | OUTPATIENT
Start: 2017-05-08 | End: 2017-05-09

## 2017-05-08 RX ORDER — WARFARIN SODIUM 5 MG/1
5 TABLET ORAL DAILY
Status: DISCONTINUED | OUTPATIENT
Start: 2017-05-08 | End: 2017-05-08

## 2017-05-08 RX ORDER — PROPOFOL 10 MG/ML
INJECTION, EMULSION INTRAVENOUS PRN
Status: DISCONTINUED | OUTPATIENT
Start: 2017-05-08 | End: 2017-05-08

## 2017-05-08 RX ORDER — AMOXICILLIN 250 MG
1-2 CAPSULE ORAL 2 TIMES DAILY PRN
Status: DISCONTINUED | OUTPATIENT
Start: 2017-05-08 | End: 2017-05-09 | Stop reason: HOSPADM

## 2017-05-08 RX ORDER — NALOXONE HYDROCHLORIDE 0.4 MG/ML
.1-.4 INJECTION, SOLUTION INTRAMUSCULAR; INTRAVENOUS; SUBCUTANEOUS
Status: DISCONTINUED | OUTPATIENT
Start: 2017-05-08 | End: 2017-05-09 | Stop reason: HOSPADM

## 2017-05-08 RX ORDER — ZINC SULFATE 50(220)MG
220 CAPSULE ORAL 2 TIMES DAILY
Status: DISCONTINUED | OUTPATIENT
Start: 2017-05-08 | End: 2017-05-09 | Stop reason: HOSPADM

## 2017-05-08 RX ORDER — ASPIRIN 325 MG
325 TABLET ORAL ONCE
Status: COMPLETED | OUTPATIENT
Start: 2017-05-08 | End: 2017-05-08

## 2017-05-08 RX ORDER — ONDANSETRON 4 MG/1
4 TABLET, ORALLY DISINTEGRATING ORAL EVERY 6 HOURS PRN
Status: DISCONTINUED | OUTPATIENT
Start: 2017-05-08 | End: 2017-05-09 | Stop reason: HOSPADM

## 2017-05-08 RX ADMIN — SUGAMMADEX 125 MG: 100 INJECTION, SOLUTION INTRAVENOUS at 12:58

## 2017-05-08 RX ADMIN — Medication 5000 UNITS: at 11:17

## 2017-05-08 RX ADMIN — PROTAMINE SULFATE 60 MG: 10 INJECTION, SOLUTION INTRAVENOUS at 12:40

## 2017-05-08 RX ADMIN — ROCURONIUM BROMIDE 30 MG: 10 INJECTION INTRAVENOUS at 10:34

## 2017-05-08 RX ADMIN — CLINDAMYCIN PHOSPHATE 900 MG: 18 INJECTION, SOLUTION INTRAVENOUS at 10:40

## 2017-05-08 RX ADMIN — PHENYLEPHRINE HYDROCHLORIDE 100 MCG: 10 INJECTION, SOLUTION INTRAMUSCULAR; INTRAVENOUS; SUBCUTANEOUS at 10:25

## 2017-05-08 RX ADMIN — SUCCINYLCHOLINE CHLORIDE 100 MG: 20 INJECTION, SOLUTION INTRAMUSCULAR; INTRAVENOUS at 10:22

## 2017-05-08 RX ADMIN — PHENYLEPHRINE HYDROCHLORIDE 100 MCG: 10 INJECTION, SOLUTION INTRAMUSCULAR; INTRAVENOUS; SUBCUTANEOUS at 10:29

## 2017-05-08 RX ADMIN — FENTANYL CITRATE 50 MCG: 50 INJECTION, SOLUTION INTRAMUSCULAR; INTRAVENOUS at 10:22

## 2017-05-08 RX ADMIN — PROPOFOL 50 MG: 10 INJECTION, EMULSION INTRAVENOUS at 10:47

## 2017-05-08 RX ADMIN — REMIFENTANIL HYDROCHLORIDE 0.1 MCG/KG/MIN: 1 INJECTION, POWDER, LYOPHILIZED, FOR SOLUTION INTRAVENOUS at 10:47

## 2017-05-08 RX ADMIN — Medication 3000 UNITS: at 11:57

## 2017-05-08 RX ADMIN — ROCURONIUM BROMIDE 10 MG: 10 INJECTION INTRAVENOUS at 10:22

## 2017-05-08 RX ADMIN — PHENYLEPHRINE HYDROCHLORIDE 100 MCG: 10 INJECTION, SOLUTION INTRAMUSCULAR; INTRAVENOUS; SUBCUTANEOUS at 10:32

## 2017-05-08 RX ADMIN — Medication 5 MG: at 11:11

## 2017-05-08 RX ADMIN — Medication 5 MG: at 10:39

## 2017-05-08 RX ADMIN — SODIUM CHLORIDE: 9 INJECTION, SOLUTION INTRAVENOUS at 10:10

## 2017-05-08 RX ADMIN — PHENYLEPHRINE HYDROCHLORIDE 100 MCG: 10 INJECTION, SOLUTION INTRAMUSCULAR; INTRAVENOUS; SUBCUTANEOUS at 10:58

## 2017-05-08 RX ADMIN — POTASSIUM CHLORIDE 20 MEQ: 750 TABLET, EXTENDED RELEASE ORAL at 15:34

## 2017-05-08 RX ADMIN — IOPAMIDOL 40 ML: 755 INJECTION, SOLUTION INTRAVASCULAR at 13:30

## 2017-05-08 RX ADMIN — SODIUM CHLORIDE, POTASSIUM CHLORIDE, SODIUM LACTATE AND CALCIUM CHLORIDE: 600; 310; 30; 20 INJECTION, SOLUTION INTRAVENOUS at 10:45

## 2017-05-08 RX ADMIN — ASPIRIN 325 MG ORAL TABLET 325 MG: 325 PILL ORAL at 07:08

## 2017-05-08 RX ADMIN — Medication 5 MG: at 10:29

## 2017-05-08 RX ADMIN — LIDOCAINE HYDROCHLORIDE 80 MG: 20 INJECTION, SOLUTION INFILTRATION; PERINEURAL at 10:22

## 2017-05-08 RX ADMIN — METOPROLOL TARTRATE 12.5 MG: 25 TABLET, FILM COATED ORAL at 20:35

## 2017-05-08 RX ADMIN — Medication 4000 UNITS: at 11:46

## 2017-05-08 RX ADMIN — ROCURONIUM BROMIDE 10 MG: 10 INJECTION INTRAVENOUS at 11:09

## 2017-05-08 RX ADMIN — Medication 5 MG: at 10:33

## 2017-05-08 RX ADMIN — Medication 5 MG: at 10:58

## 2017-05-08 RX ADMIN — ONDANSETRON 4 MG: 2 INJECTION INTRAMUSCULAR; INTRAVENOUS at 13:00

## 2017-05-08 RX ADMIN — PROPOFOL 90 MG: 10 INJECTION, EMULSION INTRAVENOUS at 10:22

## 2017-05-08 RX ADMIN — Medication 5600 UNITS: at 11:33

## 2017-05-08 RX ADMIN — TRAVOPROST 1 DROP: 0.04 SOLUTION/ DROPS OPHTHALMIC at 22:50

## 2017-05-08 RX ADMIN — HYDRALAZINE HYDROCHLORIDE 10 MG: 20 INJECTION INTRAMUSCULAR; INTRAVENOUS at 15:43

## 2017-05-08 RX ADMIN — WARFARIN SODIUM 5 MG: 5 TABLET ORAL at 19:01

## 2017-05-08 NOTE — LETTER
Transition Communication Hand-off for Care Transitions to Next Level of Care Provider    Name: Frannie Roa  MRN #: [id number]  Primary Care Provider: Romelia Parker     Primary Clinic: [facility] [address] [facility] [address]  [facility] [address]     Reason for Hospitalization:  Severe Mitral Regurge   Mitral regurgitation  Admit Date/Time: 5/8/2017  5:28 AM  Discharge Date: 5/9/17  Payor Source: Payor: MEDICARE / Plan: MEDICARE / Product Type: Medicare /     Readmission Assessment Measure (JACKELINE) Risk Score/category: Average           Reason for Communication Hand-off Referral: Other Discharge after Mitral Clip procedure.    Discharge Plan:       Concern for non-adherence with plan of care:   Y/N N  Discharge Needs Assessment:  Needs       Most Recent Value    Equipment Currently Used at Home none    Transportation Available car, family or friend will provide          Already enrolled in Tele-monitoring program and name of program:  Unknown  Follow-up specialty is recommended: Unknown - did not get to meet with pt as she discharged quickly from .  No reported needs from rehab or nursing team.  Plan to dc home with assist and OPCR.    Follow-up plan:  Future Appointments  Date Time Provider Department Center   5/18/2017 11:00 AM 2, Rh Cardiac Rehab RH[facility]   6/8/2017 9:00 AM UC LAB UCLAB [facility]   6/8/2017 9:30 AM UCECHCR1 UCCVE [facility]   6/8/2017 10:30 AM Shailesh Lay MD [facility]       Any outstanding tests or procedures:        Referrals     Future Labs/Procedures    CARDIAC REHAB REFERRAL     Comments:    Your provider has referred you to: FMG: [facility] [phone]   [email or web address]    Cardiac Rehab Referral     Comments:    Your provider has referred you to: FMG: [facility] [phone]   [email or web address]            Key  Recommendations:  Unknown - did not get a chance to eval patient while on unit.  Thanks,     DM Bailey, APSW  6C Unit   Phone: [phone]  Pager: [phone]  Unit: [phone]

## 2017-05-08 NOTE — PROGRESS NOTES
Date: 5/8/2017    Time of Call: 1:59 PM     Diagnosis:  Mitral valve regurgitation     [ TORB ] Ordering provider: Shailesh Lay MD  Order:   1.  Labs  2.  Echo  3.  EKG  Follow up appt with Dr. Lay in clinic       Order received by: Amee Wallace RN     Follow-up/additional notes:   S/p MitraClip orders

## 2017-05-08 NOTE — ANESTHESIA CARE TRANSFER NOTE
Patient: Frannie Roa    Procedure(s):  Percutaneous Mitral Valve Repair (Mitraclip) - Wound Class: I-Clean    Diagnosis: Severe Mitral Regurge   Diagnosis Additional Information: No value filed.    Anesthesia Type:   General, ETT, RSI     Note:  Airway :Nasal Cannula  Patient transferred to:PACU  Comments: Tolerated anes well. AAO VSS      Vitals: (Last set prior to Anesthesia Care Transfer)    CRNA VITALS  5/8/2017 1243 - 5/8/2017 1328      5/8/2017             Pulse: 71    ART BP: 157/81    ART Mean: 112    SpO2: 97 %    Resp Rate (observed): (!)  5                Electronically Signed By: ELMO Horne CRNA  May 8, 2017  1:28 PM

## 2017-05-08 NOTE — IP AVS SNAPSHOT
[address] [address]    Phone:  [phone]                                       After Visit Summary   5/8/2017    Frannie Roa    MRN: [id number]           After Visit Summary Signature Page     I have received my discharge instructions, and my questions have been answered. I have discussed any challenges I see with this plan with the nurse or doctor.    ..........................................................................................................................................  Patient/Patient Representative Signature      ..........................................................................................................................................  Patient Representative Print Name and Relationship to Patient    ..................................................               ................................................  Date                                            Time    ..........................................................................................................................................  Reviewed by Signature/Title    ...................................................              ..............................................  Date                                                            Time

## 2017-05-08 NOTE — BRIEF OP NOTE
General acute hospital, [place]    Brief Operative Note    Pre-operative diagnosis: Severe Mitral Regurgitation  Post-operative diagnosis * No post-op diagnosis entered *  Procedure: Procedure(s):  Percutaneous Mitral Valve Repair (Mitraclip) - Wound Class: I-Clean  Surgeon: Surgeon(s) and Role:     * Shailesh Lay MD - Primary     * Ehsan Cantor MD - Assisting  Anesthesia: General   Estimated blood loss: Less than 50 ml  Drains: None  Specimens: * No specimens in log *  Findings:   None.  Complications: None.      Franki Torrse  Structural and Interventional Cardiology Fellow  [phone]

## 2017-05-08 NOTE — H&P
History and Physical     Frannie Roa  MRN# [id number]        Date of Admission:  5/8/2017      HPI: Frannie Roa is a 81 year old female with PMHx of myxomatous mitral regurgitation, atrial fibrillation on chronic anticoagulation, and HLD who presents to [facility] today for elective mitraclip.     Past Medical History:  Past Medical History:   Diagnosis Date     Arthritis     osteoarthritis     Atrial fibrillation (H)      Bronchitis 02/2017     Glaucoma      Hyperlipidemia 2/1/2015     Myxomatous mitral valve regurgitation 4/22/2014     SVT (supraventricular tachycardia) (H) 8/25/2014     Systolic murmur 4/22/2014       Past Surgical History:  Past Surgical History:   Procedure Laterality Date     ANGIOGRAM  04/23/2017     BIOPSY      breast     ENT SURGERY      T & A     EYE SURGERY      Laser Trabelectomy for glaucoma     ORTHOPEDIC SURGERY      Right ankle cyst removal requiring ORIF     PHACOEMULSIFICATION CLEAR CORNEA W/ STANDARD IOL IMPLANT, ENDOSCOPIC CYCLOPHOTOCOAGULATION, COMBINED  11/28/2011    Procedure:COMBINED PHACOEMULSIFICATION CLEAR CORNEA WITH STANDARD INTRAOCULAR LENS IMPLANT, ENDOSCOPIC CYCLOPHOTOCOAGULATION; LEFT EYE PHACOEMULSIFICATION CLEAR CORNEA WITH STANDARD INTRAOCULAR LENS IMPLANT, ENDOSCOPIC CYCLOPHOTOCOAGULATION ; Surgeon:ELOY WARE; Location:[facility]       Allergies:     Allergies   Allergen Reactions     Penicillins      Sulfa Drugs        Medications:    No current facility-administered medications on file prior to encounter.   Current Outpatient Prescriptions on File Prior to Encounter:  warfarin (COUMADIN) 5 MG tablet Take 1 tablet (5 mg) by mouth daily (Patient taking differently: Take 7.5 mg by mouth daily )   metoprolol (LOPRESSOR) 25 MG tablet Take 0.5 tablets (12.5 mg) by mouth 2 times daily   zinc sulfate (ZINCATE) 220 MG capsule Take 220 mg by mouth 2 times daily   dorzolamide-timolol (COSOPT) 2-0.5 % ophthalmic solution Place 1 drop Into the left eye 2  times daily    travoprost Z, benzalkonium, (TRAVATAN Z) 0.004 % ophthalmic solution Place 1 drop into both eyes every evening        Social History:  Social History     Social History     Marital status:      Spouse name: N/A     Number of children: N/A     Years of education: N/A     Occupational History     Not on file.     Social History Main Topics     Smoking status: Never Smoker     Smokeless tobacco: Never Used     Alcohol use 0.0 oz/week     0 Standard drinks or equivalent per week      Comment: 3 glasses wine/week     Drug use: No     Sexual activity: Yes     Partners: Male     Other Topics Concern     Parent/Sibling W/ Cabg, Mi Or Angioplasty Before 65f 55m? No     Social History Narrative       Family History:  Family History   Problem Relation Age of Onset     Colon Cancer Mother      Myocardial Infarction Maternal Grandmother      Myocardial Infarction Paternal Grandfather      Other Cancer Sister      Leukemia Daughter        ROS:  ROS negative beside that noted in HPI    Exam:  Temp:  [97.6  F (36.4  C)] 97.6  F (36.4  C)  Heart Rate:  [56] 56  Resp:  [15] 15  BP: (106)/(68) 106/68  SpO2:  [99 %] 99 %  General: Alert, interactive, NAD  Eyes: sclera anicteric, EOMI  Resp: clear to auscultation bilaterally, no crackles or wheezes, 2L NC  Cardiovascular: regular rate and rhythm. Holosystolic murmur  GI: Soft, nontender, nondistended. +BS.  No HSM or masses, no rebound or guarding.  Skin: Warm and dry, no jaundice or rash. Groin CDI without hematoma  Neuro: Alert & oriented x 3, Cns 2-12 intact, moves all extremities equally  Psych: appropriate    Data:  CMP  Recent Labs  Lab 05/08/17  1243 05/08/17  0625    139   POTASSIUM 3.6 4.0   CHLORIDE  --  105   CO2  --  26   ANIONGAP  --  8   GLC 93 101*   BUN  --  19   CR  --  0.66   GFRESTIMATED  --  86   GFRESTBLACK  --  >90African American GFR Calc   NICOLE  --  8.5     CBC  Recent Labs  Lab 05/08/17  1243 05/08/17  0625   WBC  --  6.3   RBC  --   4.19   HGB 10.3* 12.3   HCT  --  38.1   MCV  --  91   MCH  --  29.4   MCHC  --  32.3   RDW  --  14.0   PLT  --  259       Lab Results   Component Value Date    TROPI  03/09/2017     <0.015  The 99th percentile for upper reference range is 0.045 ug/L.  Troponin values in   the range of 0.045 - 0.120 ug/L may be associated with risks of adverse   clinical events.           EKG: Sinus bradycardia, normal axis, no ST or T wave shifts      Assessment/ Plan: Frannie Roa is a 81 year old female with PMHx of myxomatous mitral regurgitation, atrial fibrillation on chronic anticoagulation,  who presents to [facility] today for elective mitraclip. Patient underwent successful mitraclip x1 to medial aspect of A2 P2 with resultant mild MR.  Patient doing well post procedure without complaint.    # Hyperlipidemia  # Atrial Fibrillation, s/p Cardioversion  # Myxomatous mitral regurgitation, s/p mitraclip x1 to medial aspect of A2/P2 resultant mild MR. Groin site CDI. Hgb stable. No arrhythmias. Denies any chest pain or discomfort. Denies shortness of breath. Currently 2L NC.  - POD echo tomorrow.  - POD CXR tomorrow.   - EKG daily and with changes  - Coumadin restart tonight.   - Continue ASA, 81 mg daily.  - Continue Metoprolol, 12.5 mg BID  - Cardiac rehab/PT/OT    Dispo: 6C  Diet: Cardiac diet  Lines: Right radial arterial line      ELMO Ariza  Interventional Cardiology-[facility]  Pager [phone]        ATTENDING NOTE:  Patient has been seen and evaluated by me on 05/09/2017. I have reviewed the H&P.  Please refer to it for additional details.  I have reviewed today's vital signs, medications, labs, and imaging results.  I have reviewed and edited, as necessary, the history, review of systems, physical examination, and assessment and plan.  I have discussed my assessment and plan with the nurse clinician.  Frannie Roa is a 81 year old female with risk factor profile (-) HTN, (-) DM, (+) hypercholesterolemia, (-)  tobacco use, (-) fam Hx premature CAD, Hx mild CAD (25%  LAD), normal LV function (LVEF 65-70%), severe MR (myxomatous A2, P2), treated with single Marisa Clip yesterday with reduction of MR to mild intensity.  Patient tolerated the procedure without complications.  Repeat ECHO pending today.  RFV site without hematoma.  Hgb 10.2.  Cr 0.82.  ECG shows SB without ST shift, TWI, or Q waves.  Plan on discharge today.      William Kong MD     Cardiovascular Division

## 2017-05-08 NOTE — IP AVS SNAPSHOT
MRN:[id number]                      After Visit Summary   5/8/2017    Frannie Roa    MRN: [id number]           Thank you!     Thank you for choosing [facility] for your care. Our goal is always to provide you with excellent care. Hearing back from our patients is one way we can continue to improve our services. Please take a few minutes to complete the written survey that you may receive in the mail after you visit with us. Thank you!        Patient Information     Date Of Birth          1935        Designated Caregiver       Most Recent Value    Caregiver    Will someone help with your care after discharge? yes      About your hospital stay     You were admitted on:  May 8, 2017 You last received care in the:  [address] [facility]    You were discharged on:  May 9, 2017        Reason for your hospital stay       You underwent a procedure for your mitral regurgitation called a mitral clip. You have done great post procedure.                  Who to Call     For medical emergencies, please call 911.  For non-urgent questions about your medical care, please call your primary care provider or clinic, [phone]  For questions related to your surgery, please call your surgery clinic        Attending Provider     Provider Specialty    Shailesh Lay MD Internal Medicine    Ono, William Erazo MD Cardiology       Primary Care Provider Office Phone # Fax #    Romelia Parker [phone]       [facility] [address]  [place] [address]        After Care Instructions     Activity       Your activity upon discharge: activity as tolerated            Diet       Follow this diet upon discharge: Orders Placed This Encounter      Regular Diet Adult                  Follow-up Appointments     Adult [facility] Follow-up and recommended labs and tests       Follow up with primary care provider, Romelia Parker, within 7 days for hospital follow-  up.  INR per INR clinic, BMP and CBC    Please follow up with valve clinic as arranged.    Please follow up with cardiac rehab     Appointments on [employer] and/or [facility] (with [facility] or [facility] provider or service). Call [phone] if you haven't heard regarding these appointments within 7 days of discharge.                  Your next 10 appointments already scheduled     May 18, 2017 11:00 AM CDT   Cardiac Evaluation with  Cardiac Rehab 2   [facility] ([facility])    [address] [address] [address] [facility] [facility] [address]  [facility] [id number] [phone]            Jun 08, 2017  9:00 AM CDT   Lab with  LAB    [facility] ([facility])    [address] [place] [id number] [phone]            Jun 08, 2017  9:30 AM CDT   Ech Complete with [email or web address]   [facility] Echo ([facility])    [address] [place] [address] [phone]           1.  Please bring or wear a comfortable two-piece outfit. 2.  You may eat, drink and take your normal medicines. 3.  For any questions that cannot be answered, please contact the ordering physician            Jun 08, 2017 10:30 AM CDT   (Arrive by 10:15 AM)   Return Mitral Valve with Shailesh Lay MD   [facility] ([facility])    [address] [place] [address] [phone]              Additional Services     CARDIAC REHAB REFERRAL       Your provider has referred you to: G: [facility] [phone]   [email or web address]            Cardiac Rehab Referral       Your provider has referred you to: G: [facility] [phone]   [email or web address]                  Further instructions from your care team         Going home after Mitraclip  Femoral Access    You  have small procedure sites at both groins. You may have small amounts of bruising or discomfort, this is expected. If you develop worse swelling, redness and warmth that does not go away, fever and chills, Increasing numbness in your legs, worsening pain at the site then you need to contact your nurse coordinator.    You may shower, but do not soak in a bath tub or pool or apply lotions, ointments, powder, etc for 3-5 days or until sites look healed.     Activity: No lifting, pushing, pulling more than 10 pounds (examples to avoid: groceries, vacuuming, gardening, golfing): For one week with a procedure through the groin.    After the initial healing process of the access site, we recommend cardiac rehabilitation for all Mitraclip patients. Cardiac rehabilitation will help you:  - Rebuild stamina, strength and balance.  - Learn how to participate in activities safely, as well as help you regain confidence to do so.  - Return to activities of daily living and leisure.  Please contact their central scheduling to arrange at [phone]    We prefer you to follow up with your primary care provider within 2 weeks. You will be arranged to see the Valve clinic in month. At that time you will have a repeat ultrasound of the heart to look at the valve.     Should you have any questions or concerns please contact your assigned Valve nurse coordinator:  Julia [phone] (at the first prompt enter #1, second prompt enter #3, then ask the triage nurse if you can speak with Julia).  Latia [phone]  Stacey [phone]      Pending Results     Date and Time Order Name Status Description    5/8/2017 2300 EKG 12-lead, tracing only Preliminary     5/8/2017 1337 EKG 12-lead, tracing only Preliminary             Statement of Approval     Ordered          05/09/17 1316  I have reviewed and agree with all the recommendations and orders detailed in this document.  EFFECTIVE NOW     Approved and electronically signed by:  Tiffany  "ELMO Ojeda CNP             Admission Information     Date & Time Provider Department Dept. Phone    2017 William Kong MD [address] [facility] [phone]      Your Vitals Were     Blood Pressure Temperature Respirations Height Weight Pulse Oximetry    99/51 98.5  F (36.9  C) (Oral) 18 1.6 m (5' 3\") 56 kg (123 lb 7.3 oz) 96%    BMI (Body Mass Index)                   21.87 kg/m2           [facility] [employer] Information     [employer] lets you send messages to your doctor, view your test results, renew your prescriptions, schedule appointments and more. To sign up, go to [email or web address] . Click on \"Log in\" on the left side of the screen, which will take you to the Welcome page. Then click on \"Sign up Now\" on the right side of the page.     You will be asked to enter the access code listed below, as well as some personal information. Please follow the directions to create your username and password.     Your access code is: [email or web address]  Expires: 2017  7:53 PM     Your access code will  in 90 days. If you need help or a new code, please call your [place] clinic or [phone].        Care EveryWhere ID     This is your Care EveryWhere ID. This could be used by other organizations to access your [place] medical records  [id number]           Review of your medicines      CONTINUE these medicines which may have CHANGED, or have new prescriptions. If we are uncertain of the size of tablets/capsules you have at home, strength may be listed as something that might have changed.        Dose / Directions    warfarin 5 MG tablet   Commonly known as:  COUMADIN   This may have changed:  how much to take   Used for:  Myxomatous mitral valve regurgitation, Chronic systolic congestive heart failure (H), Paroxysmal atrial fibrillation (H)        Dose:  5 mg   Take 1 tablet (5 mg) by mouth daily   Quantity:  90 tablet   Refills:  3         CONTINUE these medicines which have NOT CHANGED        Dose / " Directions    ASPIRIN PO        Dose:  81 mg   Take 81 mg by mouth daily   Refills:  0       dorzolamide-timolol 2-0.5 % ophthalmic solution   Commonly known as:  COSOPT        Dose:  1 drop   Place 1 drop Into the left eye 2 times daily   Refills:  0       metoprolol 25 MG tablet   Commonly known as:  LOPRESSOR   Used for:  Myxomatous mitral valve regurgitation, Chronic systolic congestive heart failure (H), Paroxysmal atrial fibrillation (H)        Dose:  12.5 mg   Take 0.5 tablets (12.5 mg) by mouth 2 times daily   Quantity:  90 tablet   Refills:  3       travoprost Z (benzalkonium) 0.004 % ophthalmic solution   Commonly known as:  TRAVATAN Z        Dose:  1 drop   Place 1 drop into both eyes every evening   Refills:  0       zinc sulfate 220 (50 ZN) MG capsule   Commonly known as:  ZINCATE        Dose:  220 mg   Take 220 mg by mouth 2 times daily   Refills:  0                Protect others around you: Learn how to safely use, store and throw away your medicines at www.disposemymeds.org.             Medication List: This is a list of all your medications and when to take them. Check marks below indicate your daily home schedule. Keep this list as a reference.      Medications           Morning Afternoon Evening Bedtime As Needed    ASPIRIN PO   Take 81 mg by mouth daily   Last time this was given:  81 mg on 5/9/2017  7:55 AM                                   dorzolamide-timolol 2-0.5 % ophthalmic solution   Commonly known as:  COSOPT   Place 1 drop Into the left eye 2 times daily                                      metoprolol 25 MG tablet   Commonly known as:  LOPRESSOR   Take 0.5 tablets (12.5 mg) by mouth 2 times daily   Last time this was given:  12.5 mg on 5/8/2017  8:35 PM                                      travoprost Z (benzalkonium) 0.004 % ophthalmic solution   Commonly known as:  TRAVATAN Z   Place 1 drop into both eyes every evening   Last time this was given:  1 drop on 5/8/2017 10:50 PM                                    warfarin 5 MG tablet   Commonly known as:  COUMADIN   Take 1 tablet (5 mg) by mouth daily   Last time this was given:  5 mg on 5/8/2017  7:01 PM                                   zinc sulfate 220 (50 ZN) MG capsule   Commonly known as:  ZINCATE   Take 220 mg by mouth 2 times daily

## 2017-05-08 NOTE — PROCEDURES
MITRACLIP PROCEDURE REPORT:     PROCEDURES PERFORMED:   1. Successful transcatheter mitral valve repair using the MitraClip device.  2. Veinotomy closure.    PHYSICIANS:  1. Attending Interventional Cardiology Staff: Blaise Ding MD and Shailesh Lay MD  2. Structural Interventional Cardiology Fellow: Franki Torres MD     INDICATION:  Frannie Roa is a 81 year old female with severe symptomatic mitral regurgitation secondary to degenerative mitral valve disease and is a poor surgical candidate who presents on an elective outpatient basis for transcatheter mitral valve repair with the MitraClip device.    DESCRIPTION:  1. Consent obtained with discussion of risks.  All questions were answered.  2. Sterile prep and procedure per OR protocol.  3. Location: right and left common femoral veins.  4. Access: Local anesthetic with lidocaine.  A micropuncture (21 g) needle with ultrasound guidance was used to establish vascular access using a modified Seldinger technique.  5. Sheath:  24Fr MitraClip delivery system sheath in the RCFV, 6Fr long sheath in the LCFV  6. Catheters: Jonathan.  7. Estimated blood loss of 25 mL.  8. See below for procedure details.    MEDICATIONS:  1. Contrast 40 mL IV.  2. Sedation per anesthesia.  3. Heparin administered to achieve a goal ACT > 300 sec per anesthesia.   4. Protamine IV.    SUMMARY:  1. Access was obtained in the right and left common femoral veins (left for possible central access if needed).  The venous site used for MitraClip delivery was pre-closed with two Perclose Proglide percutaneous suture devices.   2. Access to the left atrium was achieved using a standard transseptal puncture technique with fluoroscopic and transesophageal echocardiographic guidance using a [facility]  RF transseptal guiding sheath and needle.  A pigtail wire was advanced into the left atrium and secured in place. Following transseptal puncture intravenous heparin was administered to  achieve and maintain an activated clotting time (ACT) of >300.   3. Pre-dilation of the femoral access site was performed with ascending size dilators to allow for insertion of a 24 Persian steerable transvenous sheath.  The delivery sheath was advanced across the interatrium septum without difficulty.  The left atrial pressure was measured as 12/30/12 mmHg (a,v,mean). A pigtail catheter was positioned in the left atrium for continuous LA pressure monitoring.  4. The MitraClip Clip Delivery System was advanced into the left atrium.  Under multiplane [place] guidance, the MitraClip was oriented appropriately over the mitral valve. The clip was then opened and the arms were positioned perpendicularly to the leaflets using the en face 3D [place]ISHA projection. Once properly oriented, the clip was advanced to the left ventricle, and the clip delivery system was then pulled back and the leaflets were grasped by dropping the grippers. After confirmation of adequate grasping of the leaflets, the arms were closed and reduction of mitral regurgitation was assessed and the possibility of iatrogenic mitral stenosis was evaluated. Once an adequate reduction in mitral regurgitation was confirmed with evaluation of gradients and direct echocardiographic visualization, the clip was successfully deployed.  The clip was placed along the lateral aspect of A2/P2, but moderate MR remained so the clip was moved to the medial aspect of A2/P2 with resultant mild MR.     5. The delivery system and sheath were removed and optimal hemostasis was achieved with deployment of the Perclose sutures.   6. Mitral regurgitation was confirmed to be mild following the procedure and the mean gradient was 3 mmHg.     NOTABLE EVENTS:  1. None    COMPLICATIONS:  1. None    SUMMARY:    1. Severe symptomatic mitral regurgitation secondary to mitral valve degenerative disease in a poor surgical candidate.  2. Successful transcatheter repair of the mitral valve with the  MitraClip device using a total of 1 clip.  3. Mitral regurgitation severity improved to mild.  4. Right common femoral veinotomy closed with a suture closure device.    PLAN:    1. Aspirin 81 mg po daily lifelong.  2. Restart warfarin tonight.  3. Bedrest per protocol.  4. Admit to the primary inpatient team for further evaluation and management.  5. Echocardiogram tomorrow.       The attending interventional cardiologists were present and participated in the entire procedure.      Franki Torres  Structural Heart Disease &   Advanced Interventional Cardiology Fellow  [phone]

## 2017-05-09 ENCOUNTER — CARE COORDINATION (OUTPATIENT)
Dept: CARE COORDINATION | Facility: CLINIC | Age: 82
End: 2017-05-09

## 2017-05-09 ENCOUNTER — APPOINTMENT (OUTPATIENT)
Dept: CARDIOLOGY | Facility: CLINIC | Age: 82
DRG: 229 | End: 2017-05-09
Attending: INTERNAL MEDICINE
Payer: MEDICARE

## 2017-05-09 ENCOUNTER — APPOINTMENT (OUTPATIENT)
Dept: OCCUPATIONAL THERAPY | Facility: CLINIC | Age: 82
DRG: 229 | End: 2017-05-09
Attending: INTERNAL MEDICINE
Payer: MEDICARE

## 2017-05-09 ENCOUNTER — HOSPITAL ENCOUNTER (OUTPATIENT)
Facility: CLINIC | Age: 82
Setting detail: OBSERVATION
Discharge: HOME | End: 2017-05-10
Attending: EMERGENCY MEDICINE | Admitting: INTERNAL MEDICINE
Payer: MEDICARE

## 2017-05-09 ENCOUNTER — APPOINTMENT (OUTPATIENT)
Dept: GENERAL RADIOLOGY | Facility: CLINIC | Age: 82
DRG: 229 | End: 2017-05-09
Attending: INTERNAL MEDICINE
Payer: MEDICARE

## 2017-05-09 VITALS
DIASTOLIC BLOOD PRESSURE: 51 MMHG | HEIGHT: 63 IN | RESPIRATION RATE: 18 BRPM | BODY MASS INDEX: 21.88 KG/M2 | TEMPERATURE: 98.5 F | WEIGHT: 123.46 LBS | OXYGEN SATURATION: 96 % | SYSTOLIC BLOOD PRESSURE: 99 MMHG

## 2017-05-09 DIAGNOSIS — Z79.01 LONG-TERM (CURRENT) USE OF ANTICOAGULANTS: ICD-10-CM

## 2017-05-09 DIAGNOSIS — Z98.890 S/P MITRAL VALVE REPAIR: Primary | ICD-10-CM

## 2017-05-09 DIAGNOSIS — R79.89 ELEVATED TROPONIN: ICD-10-CM

## 2017-05-09 LAB
ALBUMIN SERPL-MCNC: 3.4 G/DL (ref 3.4–5)
ALP SERPL-CCNC: 81 U/L (ref 40–150)
ALT SERPL W P-5'-P-CCNC: 29 U/L (ref 0–50)
ANION GAP SERPL CALCULATED.3IONS-SCNC: 8 MMOL/L (ref 3–14)
ANION GAP SERPL CALCULATED.3IONS-SCNC: 9 MMOL/L (ref 3–14)
AST SERPL W P-5'-P-CCNC: 20 U/L (ref 0–45)
BASOPHILS # BLD AUTO: 0 10E9/L (ref 0–0.2)
BASOPHILS # BLD AUTO: 0.1 10E9/L (ref 0–0.2)
BASOPHILS NFR BLD AUTO: 0.5 %
BASOPHILS NFR BLD AUTO: 0.7 %
BILIRUB SERPL-MCNC: 0.6 MG/DL (ref 0.2–1.3)
BUN SERPL-MCNC: 13 MG/DL (ref 7–30)
BUN SERPL-MCNC: 15 MG/DL (ref 7–30)
CALCIUM SERPL-MCNC: 8.2 MG/DL (ref 8.5–10.1)
CALCIUM SERPL-MCNC: 8.6 MG/DL (ref 8.5–10.1)
CHLORIDE SERPL-SCNC: 104 MMOL/L (ref 94–109)
CHLORIDE SERPL-SCNC: 104 MMOL/L (ref 94–109)
CO2 SERPL-SCNC: 24 MMOL/L (ref 20–32)
CO2 SERPL-SCNC: 27 MMOL/L (ref 20–32)
CREAT SERPL-MCNC: 0.57 MG/DL (ref 0.52–1.04)
CREAT SERPL-MCNC: 0.64 MG/DL (ref 0.52–1.04)
DIFFERENTIAL METHOD BLD: ABNORMAL
DIFFERENTIAL METHOD BLD: NORMAL
EOSINOPHIL # BLD AUTO: 0.1 10E9/L (ref 0–0.7)
EOSINOPHIL # BLD AUTO: 0.3 10E9/L (ref 0–0.7)
EOSINOPHIL NFR BLD AUTO: 1.4 %
EOSINOPHIL NFR BLD AUTO: 3.1 %
ERYTHROCYTE [DISTWIDTH] IN BLOOD BY AUTOMATED COUNT: 14 % (ref 10–15)
ERYTHROCYTE [DISTWIDTH] IN BLOOD BY AUTOMATED COUNT: 14.2 % (ref 10–15)
GFR SERPL CREATININE-BSD FRML MDRD: 88 ML/MIN/1.7M2
GFR SERPL CREATININE-BSD FRML MDRD: ABNORMAL ML/MIN/1.7M2
GLUCOSE SERPL-MCNC: 119 MG/DL (ref 70–99)
GLUCOSE SERPL-MCNC: 96 MG/DL (ref 70–99)
HCT VFR BLD AUTO: 32 % (ref 35–47)
HCT VFR BLD AUTO: 37.1 % (ref 35–47)
HGB BLD-MCNC: 10.2 G/DL (ref 11.7–15.7)
HGB BLD-MCNC: 11.8 G/DL (ref 11.7–15.7)
IMM GRANULOCYTES # BLD: 0 10E9/L (ref 0–0.4)
IMM GRANULOCYTES # BLD: 0 10E9/L (ref 0–0.4)
IMM GRANULOCYTES NFR BLD: 0.1 %
IMM GRANULOCYTES NFR BLD: 0.2 %
INR PPP: 1.07 (ref 0.86–1.14)
INR PPP: 1.12 (ref 0.86–1.14)
LACTATE BLD-SCNC: 0.6 MMOL/L (ref 0.7–2.1)
LACTATE BLD-SCNC: 1.2 MMOL/L (ref 0.7–2.1)
LYMPHOCYTES # BLD AUTO: 1.9 10E9/L (ref 0.8–5.3)
LYMPHOCYTES # BLD AUTO: 2.8 10E9/L (ref 0.8–5.3)
LYMPHOCYTES NFR BLD AUTO: 23.7 %
LYMPHOCYTES NFR BLD AUTO: 32 %
MAGNESIUM SERPL-MCNC: 2.1 MG/DL (ref 1.6–2.3)
MCH RBC QN AUTO: 28.6 PG (ref 26.5–33)
MCH RBC QN AUTO: 29.1 PG (ref 26.5–33)
MCHC RBC AUTO-ENTMCNC: 31.8 G/DL (ref 31.5–36.5)
MCHC RBC AUTO-ENTMCNC: 31.9 G/DL (ref 31.5–36.5)
MCV RBC AUTO: 90 FL (ref 78–100)
MCV RBC AUTO: 92 FL (ref 78–100)
MONOCYTES # BLD AUTO: 0.8 10E9/L (ref 0–1.3)
MONOCYTES # BLD AUTO: 1.3 10E9/L (ref 0–1.3)
MONOCYTES NFR BLD AUTO: 10.8 %
MONOCYTES NFR BLD AUTO: 14.5 %
NEUTROPHILS # BLD AUTO: 4.3 10E9/L (ref 1.6–8.3)
NEUTROPHILS # BLD AUTO: 4.9 10E9/L (ref 1.6–8.3)
NEUTROPHILS NFR BLD AUTO: 49.5 %
NEUTROPHILS NFR BLD AUTO: 63.5 %
NRBC # BLD AUTO: 0 10*3/UL
NRBC # BLD AUTO: 0 10*3/UL
NRBC BLD AUTO-RTO: 0 /100
NRBC BLD AUTO-RTO: 0 /100
NT-PROBNP SERPL-MCNC: 864 PG/ML (ref 0–1800)
PHOSPHATE SERPL-MCNC: 3.3 MG/DL (ref 2.5–4.5)
PLATELET # BLD AUTO: 227 10E9/L (ref 150–450)
PLATELET # BLD AUTO: 246 10E9/L (ref 150–450)
POTASSIUM SERPL-SCNC: 3.8 MMOL/L (ref 3.4–5.3)
POTASSIUM SERPL-SCNC: 3.9 MMOL/L (ref 3.4–5.3)
PROT SERPL-MCNC: 7.1 G/DL (ref 6.8–8.8)
RBC # BLD AUTO: 3.57 10E12/L (ref 3.8–5.2)
RBC # BLD AUTO: 4.05 10E12/L (ref 3.8–5.2)
SODIUM SERPL-SCNC: 136 MMOL/L (ref 133–144)
SODIUM SERPL-SCNC: 140 MMOL/L (ref 133–144)
TROPONIN I SERPL-MCNC: 0.07 UG/L (ref 0–0.04)
WBC # BLD AUTO: 7.8 10E9/L (ref 4–11)
WBC # BLD AUTO: 8.7 10E9/L (ref 4–11)

## 2017-05-09 PROCEDURE — 36415 COLL VENOUS BLD VENIPUNCTURE: CPT

## 2017-05-09 PROCEDURE — 84484 ASSAY OF TROPONIN QUANT: CPT | Performed by: EMERGENCY MEDICINE

## 2017-05-09 PROCEDURE — 83880 ASSAY OF NATRIURETIC PEPTIDE: CPT | Performed by: EMERGENCY MEDICINE

## 2017-05-09 PROCEDURE — 40000014 ZZH STATISTIC ARTERIAL MONITORING DAILY

## 2017-05-09 PROCEDURE — 71010 XR CHEST PORT 1 VW: CPT

## 2017-05-09 PROCEDURE — 80048 BASIC METABOLIC PNL TOTAL CA: CPT | Performed by: INTERNAL MEDICINE

## 2017-05-09 PROCEDURE — A9270 NON-COVERED ITEM OR SERVICE: HCPCS | Mod: GY | Performed by: INTERNAL MEDICINE

## 2017-05-09 PROCEDURE — 85610 PROTHROMBIN TIME: CPT | Performed by: INTERNAL MEDICINE

## 2017-05-09 PROCEDURE — 93005 ELECTROCARDIOGRAM TRACING: CPT | Mod: 76 | Performed by: EMERGENCY MEDICINE

## 2017-05-09 PROCEDURE — 83735 ASSAY OF MAGNESIUM: CPT | Performed by: INTERNAL MEDICINE

## 2017-05-09 PROCEDURE — 40000275 ZZH STATISTIC RCP TIME EA 10 MIN

## 2017-05-09 PROCEDURE — 25000125 ZZHC RX 250: Performed by: INTERNAL MEDICINE

## 2017-05-09 PROCEDURE — 97535 SELF CARE MNGMENT TRAINING: CPT | Mod: GO

## 2017-05-09 PROCEDURE — 93321 DOPPLER ECHO F-UP/LMTD STD: CPT | Mod: 26 | Performed by: INTERNAL MEDICINE

## 2017-05-09 PROCEDURE — 99285 EMERGENCY DEPT VISIT HI MDM: CPT | Mod: 25 | Performed by: EMERGENCY MEDICINE

## 2017-05-09 PROCEDURE — 93005 ELECTROCARDIOGRAM TRACING: CPT

## 2017-05-09 PROCEDURE — 93308 TTE F-UP OR LMTD: CPT | Mod: 26 | Performed by: INTERNAL MEDICINE

## 2017-05-09 PROCEDURE — 93010 ELECTROCARDIOGRAM REPORT: CPT | Performed by: INTERNAL MEDICINE

## 2017-05-09 PROCEDURE — 25000132 ZZH RX MED GY IP 250 OP 250 PS 637: Mod: GY | Performed by: INTERNAL MEDICINE

## 2017-05-09 PROCEDURE — 99221 1ST HOSP IP/OBS SF/LOW 40: CPT | Mod: 25 | Performed by: INTERNAL MEDICINE

## 2017-05-09 PROCEDURE — 85025 COMPLETE CBC W/AUTO DIFF WBC: CPT | Performed by: EMERGENCY MEDICINE

## 2017-05-09 PROCEDURE — 93325 DOPPLER ECHO COLOR FLOW MAPG: CPT | Mod: 26 | Performed by: INTERNAL MEDICINE

## 2017-05-09 PROCEDURE — 85025 COMPLETE CBC W/AUTO DIFF WBC: CPT | Performed by: INTERNAL MEDICINE

## 2017-05-09 PROCEDURE — 97165 OT EVAL LOW COMPLEX 30 MIN: CPT | Mod: GO

## 2017-05-09 PROCEDURE — 83605 ASSAY OF LACTIC ACID: CPT | Performed by: EMERGENCY MEDICINE

## 2017-05-09 PROCEDURE — 80053 COMPREHEN METABOLIC PANEL: CPT | Performed by: EMERGENCY MEDICINE

## 2017-05-09 PROCEDURE — 93308 TTE F-UP OR LMTD: CPT

## 2017-05-09 PROCEDURE — 99285 EMERGENCY DEPT VISIT HI MDM: CPT | Mod: 25

## 2017-05-09 PROCEDURE — 83605 ASSAY OF LACTIC ACID: CPT | Performed by: INTERNAL MEDICINE

## 2017-05-09 PROCEDURE — 40000133 ZZH STATISTIC OT WARD VISIT

## 2017-05-09 PROCEDURE — 97110 THERAPEUTIC EXERCISES: CPT | Mod: GO

## 2017-05-09 PROCEDURE — 40000893 ZZH STATISTIC PT IP EVAL DEFER

## 2017-05-09 PROCEDURE — 84100 ASSAY OF PHOSPHORUS: CPT | Performed by: INTERNAL MEDICINE

## 2017-05-09 PROCEDURE — 93010 ELECTROCARDIOGRAM REPORT: CPT | Mod: Z6 | Performed by: EMERGENCY MEDICINE

## 2017-05-09 PROCEDURE — 85610 PROTHROMBIN TIME: CPT | Performed by: EMERGENCY MEDICINE

## 2017-05-09 PROCEDURE — 36415 COLL VENOUS BLD VENIPUNCTURE: CPT | Performed by: INTERNAL MEDICINE

## 2017-05-09 RX ADMIN — ASPIRIN 81 MG: 81 TABLET, COATED ORAL at 07:55

## 2017-05-09 RX ADMIN — POTASSIUM CHLORIDE 10 MEQ: 14.9 INJECTION, SOLUTION, CONCENTRATE PARENTERAL at 04:58

## 2017-05-09 RX ADMIN — POTASSIUM CHLORIDE 10 MEQ: 14.9 INJECTION, SOLUTION, CONCENTRATE PARENTERAL at 05:48

## 2017-05-09 ASSESSMENT — ENCOUNTER SYMPTOMS
CONSTITUTIONAL NEGATIVE: 1
PALPITATIONS: 1
SHORTNESS OF BREATH: 0

## 2017-05-09 ASSESSMENT — ACTIVITIES OF DAILY LIVING (ADL): PREVIOUS_RESPONSIBILITIES: MEAL PREP;HOUSEKEEPING;LAUNDRY;SHOPPING;DRIVING;MEDICATION MANAGEMENT;FINANCES

## 2017-05-09 NOTE — IP AVS SNAPSHOT
MRN:[id number]                      After Visit Summary   5/9/2017    Frannie Roa    MRN: [id number]           Thank you!     Thank you for choosing [facility] for your care. Our goal is always to provide you with excellent care. Hearing back from our patients is one way we can continue to improve our services. Please take a few minutes to complete the written survey that you may receive in the mail after you visit with us. Thank you!        Patient Information     Date Of Birth          1935        About your hospital stay     You were admitted on:  May 10, 2017 You last received care in the:  Unit 6D Observation [facility]    You were discharged on:  May 10, 2017        Reason for your hospital stay       You were experiencing palpitations after your procedure for which you returned to the ED. You were found to be quite stable post procedure and will be discharged with a cardiac event monitor to ensure you don't have any abnormal heart rhythms                  Who to Call     For medical emergencies, please call 911.  For non-urgent questions about your medical care, please call your primary care provider or clinic, [phone]          Attending Provider     Provider Specialty    Estela Crawford MD Emergency Medicine    [place], William Erazo MD Cardiology    Teofilo Red MD Cardiology       Primary Care Provider Office Phone # Fax #    Romelia Parker [phone]       [facility] [address]  [place] [address]        After Care Instructions     Activity       Your activity upon discharge: activity as tolerated            Diet       Follow this diet upon discharge: Orders Placed This Encounter      Combination Diet Low Saturated Fat Na <2400mg Diet, No Caffeine Diet                  Follow-up Appointments     Adult [facility] Follow-up and recommended labs and tests       Follow up with primary care provider, Romelia Kaba  Elena, within 7 days for hospital follow- up. INR per INR clinic, BMP and CBC    Please follow up with valve clinic in one month, as arranged.     Please participate with cardiac rehab, as arranged.        Appointments on [facility] and/or [facility] (with [facility] or [facility] provider or service). Call [phone] if you haven't heard regarding these appointments within 7 days of discharge.                  Your next 10 appointments already scheduled     May 16, 2017 10:00 AM CDT   SHORT with Romelia Parker MD   [facility] ([facility])    [address]  [facility] [id number] [phone]            May 18, 2017  2:00 PM CDT   Cardiac Evaluation with  Cardiac Rehab 2   [facility] ([facility])    [address], [address]  [facility] [id number] [phone]            Jun 08, 2017  9:00 AM CDT   Lab with  LAB    [facility] ([facility])    [address] [place] [id number] [phone]            Jun 08, 2017  9:30 AM CDT   Ech Complete with ECHCR1    [facility] Echo ([facility])    [address] [place] [address] [phone]           1.  Please bring or wear a comfortable two-piece outfit. 2.  You may eat, drink and take your normal medicines. 3.  For any questions that cannot be answered, please contact the ordering physician            Jun 08, 2017 10:30 AM CDT   (Arrive by 10:15 AM)   Return Mitral Valve with Shailesh Lay MD   [facility] ([facility])    [address] [place] [id number] [phone]              Additional Services     Cardiac Rehab Referral       Your provider has referred you to: [facility]: [facility] [phone]   [email or web address]                  Further  "instructions from your care team         * Heart Palpitations    Palpitations refers to the feeling that your heart is beating hard, fast or irregular. Some people describe it as \"pounding\" or \"skipped beats\". Palpitations may occur in persons with heart disease, but can also occur in healthy persons. Your doctor does not believe that anything dangerous is causing your symptoms at this time.  Heart-Related Causes:    Arrhythmia (a change from the heart's normal rhythm)    Disease of the heart valves  Non-Heart-Related Causes:    Certain medicines (such as asthma inhalers and decongestants)    Some herbal supplements, energy drinks and pills, and weight loss pills    Illegal stimulant drugs (such as cocaine, crank, methamphetamine, PCP)    Caffeine, alcohol and tobacco    Medical conditions such as thyroid disease, anemia, anxiety and panic disorder  Sometimes the cause cannot be found.  Home Care:  1. Avoid excess caffeine, alcohol, tobacco and any stimulant drugs.  2. Tell your doctor about any prescription or over-the-counter or herbal medicines you take.  Follow Up  with your doctor or as advised by our staff.  Get Prompt Medical Attention  if any of the following occur together with palpitations:    Weakness, dizziness, light-headed or fainting    Chest pain or shortness of breath    Rapid heart rate (over 120 beats per minute, at rest)    Palpitations that lasts over 20 minutes    Weakness of an arm or leg or one side of the face    Difficulty with speech or vision    [id number] [employer]. [address] [place] [address]. All rights reserved. This information is not intended as a substitute for professional medical care. Always follow your healthcare professional's instructions.      Please follow previous post procedure discharge instructions for specific precautions and instructions.         Pending Results     Date and Time Order Name Status Description    5/10/2017 1030 Cardiac " "event monitor In process     5/10/2017 0919 XR Chest 2 Views In process     2017 2202 EKG 12-lead, complete Preliminary     2017 2121 EKG 12 lead Preliminary     2017 2300 EKG 12-lead, tracing only Preliminary     2017 1337 EKG 12-lead, tracing only Preliminary             Statement of Approval     Ordered          05/10/17 1125  I have reviewed and agree with all the recommendations and orders detailed in this document.  EFFECTIVE NOW     Approved and electronically signed by:  Katy Allen APRN CNP             Admission Information     Date & Time Provider Department Dept. Phone    2017 Teofilo Red MD Unit 6D Observation [facility] [phone]      Your Vitals Were     Blood Pressure Temperature Respirations Height Weight Pulse Oximetry    114/71 (BP Location: Right arm) 98.3  F (36.8  C) (Oral) 18 1.6 m (5' 3\") 54.9 kg (121 lb) 97%    BMI (Body Mass Index)                   21.43 kg/m2           [employer] Information     [employer] lets you send messages to your doctor, view your test results, renew your prescriptions, schedule appointments and more. To sign up, go to [email or web address] . Click on \"Log in\" on the left side of the screen, which will take you to the Welcome page. Then click on \"Sign up Now\" on the right side of the page.     You will be asked to enter the access code listed below, as well as some personal information. Please follow the directions to create your username and password.     Your access code is: [email or web address]  Expires: 2017  7:53 PM     Your access code will  in 90 days. If you need help or a new code, please call your [place] clinic or [phone].        Care EveryWhere ID     This is your Care EveryWhere ID. This could be used by other organizations to access your [place] medical records  [id number]           Review of your medicines      CONTINUE these medicines which may have CHANGED, or have new prescriptions. If we are " uncertain of the size of tablets/capsules you have at home, strength may be listed as something that might have changed.        Dose / Directions    warfarin 5 MG tablet   Commonly known as:  COUMADIN   This may have changed:  how much to take   Used for:  Myxomatous mitral valve regurgitation, Chronic systolic congestive heart failure (H), Paroxysmal atrial fibrillation (H)        Dose:  5 mg   Take 1 tablet (5 mg) by mouth daily   Quantity:  90 tablet   Refills:  3         CONTINUE these medicines which have NOT CHANGED        Dose / Directions    ASPIRIN PO        Dose:  81 mg   Take 81 mg by mouth daily   Refills:  0       dorzolamide-timolol 2-0.5 % ophthalmic solution   Commonly known as:  COSOPT        Dose:  1 drop   Place 1 drop Into the left eye 2 times daily   Refills:  0       metoprolol 25 MG tablet   Commonly known as:  LOPRESSOR   Used for:  Myxomatous mitral valve regurgitation, Chronic systolic congestive heart failure (H), Paroxysmal atrial fibrillation (H)        Dose:  12.5 mg   Take 0.5 tablets (12.5 mg) by mouth 2 times daily   Quantity:  90 tablet   Refills:  3       travoprost Z (benzalkonium) 0.004 % ophthalmic solution   Commonly known as:  TRAVATAN Z        Dose:  1 drop   Place 1 drop into both eyes every evening   Refills:  0       zinc sulfate 220 (50 ZN) MG capsule   Commonly known as:  ZINCATE        Dose:  220 mg   Take 220 mg by mouth 2 times daily   Refills:  0                Protect others around you: Learn how to safely use, store and throw away your medicines at www.disposemymeds.org.             Medication List: This is a list of all your medications and when to take them. Check marks below indicate your daily home schedule. Keep this list as a reference.      Medications           Morning Afternoon Evening Bedtime As Needed    ASPIRIN PO   Take 81 mg by mouth daily   Last time this was given:  81 mg on 5/10/2017  8:30 AM                                dorzolamide-timolol 2-0.5  % ophthalmic solution   Commonly known as:  COSOPT   Place 1 drop Into the left eye 2 times daily                                metoprolol 25 MG tablet   Commonly known as:  LOPRESSOR   Take 0.5 tablets (12.5 mg) by mouth 2 times daily   Last time this was given:  12.5 mg on 5/10/2017  8:31 AM                                travoprost Z (benzalkonium) 0.004 % ophthalmic solution   Commonly known as:  TRAVATAN Z   Place 1 drop into both eyes every evening                                warfarin 5 MG tablet   Commonly known as:  COUMADIN   Take 1 tablet (5 mg) by mouth daily                                zinc sulfate 220 (50 ZN) MG capsule   Commonly known as:  ZINCATE   Take 220 mg by mouth 2 times daily   Last time this was given:  220 mg on 5/10/2017  8:31 AM

## 2017-05-09 NOTE — PROGRESS NOTES
05/09/17 1000   Quick Adds   Type of Visit Initial Occupational Therapy Evaluation   Living Environment   Lives With spouse   Living Arrangements house   Home Accessibility stairs within home   Number of Stairs to Enter Home 0   Number of Stairs Within Home 12   Transportation Available car;family or friend will provide   Living Environment Comment Pt lives with SO. Pt's son and family members able to assist as needed upon discharge.    Self-Care   Usual Activity Tolerance moderate   Current Activity Tolerance moderate   Regular Exercise yes   Activity/Exercise Type walking   Equipment Currently Used at Home none   Activity/Exercise/Self-Care Comment Pt previously independent with ADLs/IADLs and functional mobility. Pt reports son assists as needed for IADLs.    Functional Level Prior   Ambulation 0-->independent   Transferring 0-->independent   Toileting 0-->independent   Bathing 0-->independent   Dressing 0-->independent   Eating 0-->independent   Communication 0-->understands/communicates without difficulty   Swallowing 0-->swallows foods/liquids without difficulty   Cognition 0 - no cognition issues reported   Fall history within last six months no   General Information   Onset of Illness/Injury or Date of Surgery - Date 05/08/17   Referring Physician Katy Allen CNP    Patient/Family Goals Statement to return home    Additional Occupational Profile Info/Pertinent History of Current Problem Frannie Roa is a 81 year old female with PMHx of myxomatous mitral regurgitation, atrial fibrillation on chronic anticoagulation, and HLD who presents to [facility] today for elective mitraclip.    Precautions/Limitations no known precautions/limitations   Cognitive Status Examination   Cognitive Comment No deficits noted.    Pain Assessment   Patient Currently in Pain No   Integumentary/Edema   Integumentary/Edema no deficits were identifed   Posture   Posture kyphosis   Range of Motion (ROM)   ROM Comment Ru AREVALO   "  Strength   Strength Comments Ru grossly 4/5 MMT    Transfer Skill: Bed to Chair/Chair to Bed   Level of [place]: Bed to Chair stand-by assist   Transfer Skill: Sit to Stand   Level of [place]: Sit/Stand stand-by assist   Transfer Skill: Toilet Transfer   Level of [place]: Toilet stand-by assist   Instrumental Activities of Daily Living (IADL)   Previous Responsibilities meal prep;housekeeping;laundry;shopping;driving;medication management;finances   Activities of Daily Living Analysis   Impairments Contributing to Impaired Activities of Daily Living strength decreased  (decreased activity tolerance )   General Therapy Interventions   Planned Therapy Interventions ADL retraining;IADL retraining;progressive activity/exercise;risk factor education;home program guidelines;strengthening   Clinical Impression   Criteria for Skilled Therapeutic Interventions Met yes, treatment indicated   OT Diagnosis decreased ADL I    Influenced by the following impairments decreased strength and activity tolerance    Assessment of Occupational Performance 3-5 Performance Deficits   Identified Performance Deficits bathing, home management, community mobility   Clinical Decision Making (Complexity) Low complexity   Therapy Frequency daily   Predicted Duration of Therapy Intervention (days/wks) 5 days   Anticipated Discharge Disposition Home with Assist;Home with Outpatient Therapy   Risks and Benefits of Treatment have been explained. Yes   Patient, Family & other staff in agreement with plan of care Yes   Clinical Impression Comments Pt presents with decreased strength and activity tolerance leading to decreased ADL I. Pt to benefit from skilled OT intervention to address the above stated deficits.    [facility] [employer] TM \"6 Clicks\"   2016, Trustees of [facility], under license to [employer].  All rights reserved.   6 Clicks Short Forms Daily Activity Inpatient Short Form   [facility] AM-PAC  \"6 " "Clicks\" Daily Activity Inpatient Short Form   1. Putting on and taking off regular lower body clothing? 4 - None   2. Bathing (including washing, rinsing, drying)? 3 - A Little   3. Toileting, which includes using toilet, bedpan or urinal? 4 - None   4. Putting on and taking off regular upper body clothing? 4 - None   5. Taking care of personal grooming such as brushing teeth? 4 - None   6. Eating meals? 4 - None   Daily Activity Raw Score (Score out of 24.Lower scores equate to lower levels of function) 23   Total Evaluation Time   Total Evaluation Time (Minutes) 5     "

## 2017-05-09 NOTE — DISCHARGE SUMMARY
[facility]  [address] [facility] [address]  p: [phone]    Discharge Summary: Cardiology Service    Frannie Estrada MRN# [id number]   YOB: 1935 Age: 81 year old       Admission Date: 2017  Discharge Date: 17      Discharge Diagnoses:  1.Myxomatous mitral regurgitation, s/p mitraclip x1  2. Hyperlipidemia  3. Atrial Fibrillation    Pertinent Procedures:  1. Mitraclip  2. Echocardiogram    Consults:  PT/OT    Imaging with results:  Echocardiogram   Recent Results (from the past 4320 hour(s))   Echocardiogram Limited    Narrative    [id number]  [id number]  [id number]  [id number]^JUDITH^TUNDE^           [facility],[place]  Echocardiography Laboratory  [address] [place] [address]     Name: FRANNIE ESTRADA  MRN: [id number]  : 1935  Study Date: 2017 11:52 AM  Age: 81 yrs  Gender: Female  Patient Location: [facility]  Reason For Study: S/P MV clip  History: S/P MV clip  Ordering Physician: TUNDE WONG  Referring Physician: MILDRED ANTONIO  Performed By: Jazzmine Cao RDCS     BSA: 1.6 m2  Height: 63 in  Weight: 123 lb  BP: 99/51 mmHg  _____________________________________________________________________________  __        Procedure  Limited Portable Echo Adult.  _____________________________________________________________________________  __        Interpretation Summary  Global and regional left ventricular function is normal with an EF of 55-60%.  S/p edge to edge mitral valve repair with a Mitraclip device. The device is  well anchored and stable. There is mild residual mitral regurgitation. The  mean diastolic gradient is 6 mmHg at a herat rate of 69 bpm.  Right ventricular systolic pressure is 32 mmHg above the right atrial  pressure.  The inferior vena cava is normal. Estimated mean right atrial pressure is <3  mmHg.  No pericardial effusion is  present.  _____________________________________________________________________________  __        Left Ventricle  Left ventricular size is normal. Left ventricular wall thickness is normal.  Global and regional left ventricular function is normal with an EF of 55-60%.  The LV diastolic function cannot be evaluated. No regional wall motion  abnormalities are seen.     Right Ventricle  The right ventricle is normal size. Global right ventricular function is  normal.     Atria  Severe biatrial enlargement is present.     Mitral Valve  Severe mitral annular calcification is present. S/p edge to edge mitral valve  repair with a Mitraclip device. The device is well anchored and stable. There  is mild residual mitral regurgitation. The mean diastolic gradient is 6 mmHg  at a herat rate of 69 bpm.        Aortic Valve  Mild aortic valve sclerosis is present.     Tricuspid Valve  Mild tricuspid insufficiency is present. Right ventricular systolic pressure  is 32mmHg above the right atrial pressure.     Pulmonic Valve  The pulmonic valve is normal.     Vessels  The aorta root is normal. The inferior vena cava is normal. Estimated mean  right atrial pressure is <3 mmHg.     Pericardium  No pericardial effusion is present.        Compared to Previous Study  Compared with prior, a mitraclip device is in place.  _____________________________________________________________________________  __  MMode/2D Measurements & Calculations     IVSd: 0.64 cm  LVIDd: 4.7 cm  LVIDs: 3.0 cm  LVPWd: 0.73 cm  FS: 36.6 %  EDV(Teich): 103.0 ml  ESV(Teich): 34.7 ml  LV mass(C)d: 101.3 grams  LV mass(C)dI: 64.4 grams/m2  MV Diam: 3.7 cm  LA Volume (BP): 84.0 ml  LA Volume Index (BP): 53.5 ml/m2        Doppler Measurements & Calculations  MV E max justyn: 167.9 cm/sec  MV A max justyn: 154.7 cm/sec  MV E/A: 1.1     MV max P.5 mmHg  MV mean P.6 mmHg  MV V2 VTI: 65.8 cm  MV Flow area(1diam): 10.9 cm2  MV P1/2t max justyn: 186.7 cm/sec  MV P1/2t: 61.4  msec  MVA(P1/2t): 3.6 cm2  MV dec slope: 891.0 cm/sec2  MV dec time: 0.30 sec  Ao V2 max: 219.0 cm/sec  Ao max P.0 mmHg  Ao V2 mean: 166.2 cm/sec  Ao mean P.1 mmHg  Ao V2 VTI: 46.2 cm  SV(MV 1 diam): 720.5 ml  SI(MV 1 diam): 458.1 ml/m2  TR max justyn: 281.6 cm/sec  TR max P.7 mmHg           _____________________________________________________________________________  __           Report approved by: Grisel Mcelroy 2017 01:11 PM      Echocardiogram Complete    Narrative    [id number]  [id number]  [id number]  [id number]^CONSUELO^VICKI^ANITHA           [facility]  Diagnostic and Treamtent-3rd Floor  [address] [place] [id number]     Name: WILL ESTRADA  MRN: [id number]  : 1935  Study Date: 03/15/2017 08:22 AM  Age: 81 yrs  Gender: Female  Patient Location: [facility] [place]  Reason For Study: , Palpitations  Ordering Physician: VICKI CORDERO  Referring Physician: VICKI CORDERO  Performed By: Iris Salcedo RDCS     BSA: 1.6 m2  Height: 63 in  Weight: 123 lb  BP: 118/76 mmHg  _____________________________________________________________________________  __        Procedure  Echocardiogram with two-dimensional, color and spectral Doppler performed.  Limited Echocardiogram with portions of two-dimensional, color and spectral  Doppler performed.  _____________________________________________________________________________  __        Interpretation Summary  Global and regional left ventricular function is normal with an EF of 60-65%.  The LV filling pattern is c/w grade III diastolic dysfunction and increased  left atrial pressure.  Global right ventricular function is normal.  Severe left atrial enlargement.  Moderate mitral annular calcification. Bileaflet mitral valve prolapse is  present.  Moderate to severe mitral insufficiency. The ERO is 30 mm2 and the RVol is 54  mL.  Pulmonary artery systolic pressure is normal.  The inferior vena cava  is normal in size with preserved respiratory  variability. Estimated mean right atrial pressure is <3 mmHg.  No pericardial effusion is present.  This study was compared with the study from 10/14/2016. The degree of MR is  quantified as described above.  _____________________________________________________________________________  __        Left Ventricle  Left ventricular size is normal. Left ventricular wall thickness is normal.  Global and regional left ventricular function is normal with an EF of 60-65%.  No regional wall motion abnormalities are seen. The LV filling pattern is c/w  grade III diastolic dysfunction and increased left atrial pressure.     Right Ventricle  The right ventricle is normal size. Global right ventricular function is  normal.     Atria  Mild right atrial enlargement is present. Severe left atrial enlargement is  present.     Mitral Valve  Moderate mitral annular calcification is present. Bileaflet mitral valve  prolapse is present. Moderate to severe mitral insufficiency is present. The  ERO is 30 mm2 and the RVol is 54 mL.        Aortic Valve  Mild aortic valve sclerosis is present.     Tricuspid Valve  The tricuspid valve is normal. Mild tricuspid insufficiency is present.  Pulmonary artery systolic pressure is normal. The right ventricular systolic  pressure is approximated at 23.2 mmHg plus the right atrial pressure.     Pulmonic Valve  The pulmonic valve is normal.     Vessels  The inferior vena cava was normal in size with preserved respiratory  variability. Ascending aorta 3.4 cm. Estimated mean right atrial pressure is  <3 mmHg.     Pericardium  No pericardial effusion is present.        Compared to Previous Study  This study was compared with the study from 10/14/2016 . The degree of MR is  quantified as described above.  _____________________________________________________________________________  __  MMode/2D Measurements & Calculations     IVSd: 0.97 cm  LVIDd: 5.1  cm  LVIDs: 3.0 cm  LVPWd: 0.99 cm  FS: 40.8 %  EDV(Teich): 123.9 ml  ESV(Teich): 35.6 ml  LV mass(C)d: 184.0 grams  asc Aorta Diam: 3.4 cm  LA Volume (BP): 119.0 ml  LA Volume Index (BP): 75.8 ml/m2        Doppler Measurements & Calculations  MV E max justyn: 127.4 cm/sec  MV A max justyn: 81.0 cm/sec  MV E/A: 1.6     MV max P.6 mmHg  MV mean PG: 3.2 mmHg  MV V2 VTI: 38.8 cm  MV dec time: 0.21 sec  TR max justyn: 241.1 cm/sec  TR max P.2 mmHg  Lateral E/e': 28.3  Medial E/e': 30.5           _____________________________________________________________________________  __           Report approved by: Grisel Mcelroy 03/15/2017 09:47 AM            Mitraclip:  SUMMARY:    1. Severe symptomatic mitral regurgitation secondary to mitral valve degenerative disease in a poor surgical candidate.  2. Successful transcatheter repair of the mitral valve with the MitraClip device using a total of 1 clip.  3. Mitral regurgitation severity improved to mild.  4. Right common femoral veinotomy closed with a suture closure device.       Brief HPI:  Frannie Roa is a 81 year old female with PMHx of myxomatous mitral regurgitation, atrial fibrillation on chronic anticoagulation, who presents to [facility] today for elective mitraclip. Patient underwent successful mitraclip x1 to medial aspect of A2 P2 with resultant mild MR. Patient doing well post procedure without complaint.    Hospital Course by Diagnosis:       # Hyperlipidemia  # Atrial Fibrillation  # Myxomatous mitral regurgitation, s/p mitraclip x1 to medial aspect of A2/P2 resultant mild MR. Groin site CDI. Hgb stable. No arrhythmias. Denies any chest pain or discomfort. Denies shortness of breath. Not requiring any supplemental o2. Currently SB. Post op echo reveals reduction of MR to mild without effusion. EF normal.  - Restart Coumadin.  - Continue ASA, 81 mg daily.  - Continue Metoprolol, 12.5 mg BID  - Cardiac rehab/PT/OT      Condition on discharge  Temp:  [97.7  F  (36.5  C)-99.9  F (37.7  C)] 99.6  F (37.6  C)  Heart Rate:  [55-95] 70  Resp:  [8-18] 16  BP: ()/(52-76) 94/63  MAP:  [55 mmHg-98 mmHg] 64 mmHg  Arterial Line BP: ()/(31-69) 97/47  SpO2:  [91 %-100 %] 95 %  General: Alert, interactive, NAD  Cardiovascular: 2/6 NINOSKA, regular rate and rhythm, normal S1 and S2,   Resp: clear to auscultation bilaterally, no rales, wheezes, or rhonchi  GI: Soft, nontender, nondistended. +BS.  No HSM or masses, no rebound or guarding.  Extremities: No edema, no cyanosis or clubbing, dorsalis pedis and posterior tibialis pulses 2+ bilaterally  Skin: Warm and dry, no jaundice or rash. Groin site CDI  Neuro: CN 2-12 intact, moves all extremities equally  Psych: Alert & oriented x 3      Medication Changes:  Continue PTA    Discharge medications:   Current Discharge Medication List      CONTINUE these medications which have NOT CHANGED    Details   ASPIRIN PO Take 81 mg by mouth daily      warfarin (COUMADIN) 5 MG tablet Take 1 tablet (5 mg) by mouth daily  Qty: 90 tablet, Refills: 3    Associated Diagnoses: Myxomatous mitral valve regurgitation; Chronic systolic congestive heart failure (H); Paroxysmal atrial fibrillation (H)      metoprolol (LOPRESSOR) 25 MG tablet Take 0.5 tablets (12.5 mg) by mouth 2 times daily  Qty: 90 tablet, Refills: 3    Associated Diagnoses: Myxomatous mitral valve regurgitation; Chronic systolic congestive heart failure (H); Paroxysmal atrial fibrillation (H)      zinc sulfate (ZINCATE) 220 MG capsule Take 220 mg by mouth 2 times daily      dorzolamide-timolol (COSOPT) 2-0.5 % ophthalmic solution Place 1 drop Into the left eye 2 times daily       travoprost Z, benzalkonium, (TRAVATAN Z) 0.004 % ophthalmic solution Place 1 drop into both eyes every evening              Labs or imaging requiring follow-up after discharge:  BMP and CBC, INR per clinic      Follow-up:  Valve clinic in one month, as arranged.   PCP within a week  Cardiac rehab, as  arranged.    ELMO Ariza, CNP  [facility]  Interventional Cardiology-CSI Service  Pager [phone]       Patient Care Team:  Romelia Parker MD as PCP - General (Internal Medicine)  Debora Whalen APRN CNS as Nurse Coordinator (Clinical Nurse Specialist)  Asim Altamirano MD as MD (Cardiology)  Amee Wallace as Nurse Coordinator (Cardiology)

## 2017-05-09 NOTE — ANESTHESIA PROCEDURE NOTES
Arterial Line Procedure Note  Staff:     Anesthesiologist:  SHERIE TORREZ  Location: In OR After Induction  Procedure Start/Stop Times:     patient identified, IV checked, site marked, risks and benefits discussed, informed consent, monitors and equipment checked, pre-op evaluation and at physician/surgeon's request      Correct Patient: Yes      Correct Position: Yes      Correct Site: Yes      Correct Procedure: Yes      Correct Laterality:  Yes    Site Marked:  Yes  Line Placement:     Procedure:  Arterial Line    Insertion Site:  Radial    Insertion laterality:  Right    Skin Prep: Chloraprep      Patient Prep: patient draped, mask, sterile gloves, hat and hand hygiene      Local skin infiltration:  None    Ultrasound Guided?: No      Catheter size:  20 gauge, Quick cath    Cath secured with: suture      Dressing:  Tegaderm    Complications:  None obvious    Arterial waveform: Yes      IBP within 10% of NIBP: Yes

## 2017-05-09 NOTE — PLAN OF CARE
Problem: Goal Outcome Summary  Goal: Goal Outcome Summary  Outcome: Improving  D/I/A: Neuro: Intact, calls appropriately, up to chair x2 w/ standby assist, denies pain, q2hr neuro checks performed  CV: HR 60s-80s NSR. R-radial A-line in place. BPs increasingly hypotensive 90s/40s  this AM with MAPs 56-65. MDs notified and assessed patient, no changes were made. Pt states she feels just fine. Bilateral groin sites intact, L side has small hardened area surrounding site which has not grown. CMS intact with good pulses. K replaced this AM.  Pulm: RA 02 sats 92-96%. RR 10-16. Lung sounds clear. Capnography on throughout night IPI 7-10, ETCO2s 30s.   GI: Diet advanced to regular diet this AM, pt tolerated pudding and liquids overnight. No BM.  : Mccarthy in place with variable UO, 30-70 hr.  P: Plan for possible transfer/discharge home today. Continue to monitor BPs.

## 2017-05-09 NOTE — PLAN OF CARE
Problem: Goal Outcome Summary  Goal: Goal Outcome Summary  OT/CR: OT Evaluation completed and treatment initiated. Pt previously independent with ADLs and functional mobility. Pt SBA for all functional transfers. Pt ambulated hallway 450ft with SBA and no LOB. Pt endorsed mild fatigue however did not require rest breaks. Pt on RA O2 sats >93% HR 90s. Pt reports SO, son, and family members are able to assist as needed following discharge.      Rec home with assistance and OP Cardiac Rehab. Referral made to [facility].

## 2017-05-09 NOTE — ANESTHESIA POSTPROCEDURE EVALUATION
Patient: Frannie Roa    Procedure(s):  Percutaneous Mitral Valve Repair (Mitraclip) - Wound Class: I-Clean    Diagnosis:Severe Mitral Regurge   Diagnosis Additional Information: No value filed.    Anesthesia Type:  General, ETT, RSI    Note:  Anesthesia Post Evaluation    Patient location during evaluation: PACU  Patient participation: Able to fully participate in evaluation  Level of consciousness: awake and alert  Pain management: adequate  Airway patency: patent  Cardiovascular status: acceptable and hemodynamically stable  Respiratory status: acceptable and spontaneous ventilation  Hydration status: acceptable  PONV: none     Anesthetic complications: None    Comments: Patient tolerated procedure very well        Last vitals:  Vitals:    05/08/17 1815 05/08/17 1830 05/08/17 1845   BP:      Resp:      Temp:      SpO2: 96% 98% 97%         Electronically Signed By: Robin Arreaga MD  May 8, 2017  7:06 PM

## 2017-05-09 NOTE — PLAN OF CARE
Problem: Goal Outcome Summary  Goal: Goal Outcome Summary  Transfer  Transferred from:   Via: wheelchair  Reason for transfer:Pt appropriate for 6C- improved patient condition  Family: Aware of transfer  Belongings: Sent with pt  Chart: Sent with pt  Medications: Meds from bin sent with pt  Report called from: KATELIN Levi

## 2017-05-09 NOTE — PLAN OF CARE
Problem: Goal Outcome Summary  Goal: Goal Outcome Summary  Outcome: Improving  A & o, neuros intact. Bilateral groin sites CDI, small lumps on both sides unchanged throughout shift. Weak pulses on LLE, normal on RLE. 2+ radials. Blood pressure elevated, hydralazine given.  HR badycardic 50s-60s. Now 90s.  Off bedrest @ 1830, no changes to bilateral groin sites. Weaned off O2, remains on capnography  ETCO2s 30s, ipis 10. Will continue to monitor and follow plan of care.

## 2017-05-09 NOTE — PLAN OF CARE
Problem: Goal Outcome Summary  Goal: Goal Outcome Summary  DISCHARGE   Discharged to: Home  Via: Automobile  Accompanied by: Family  Discharge Instructions: diet, activity, medications, follow up appointments, when to call the MD, and what to watchout for (i.e. s/s of infection, increasing SOB, palpitations, chest pain,)  Prescriptions: To be filled by pharmacy per pt's request; medication list reviewed & sent with pt  Follow Up Appointments: arranged; information given  Belongings: All sent with pt  IV: out  Telemetry: off  Pt exhibits understanding of above discharge instructions; all questions answered.  Discharge Paperwork: faxed

## 2017-05-09 NOTE — PLAN OF CARE
Problem: Cardiac Surgery (Adult)  Intervention: Prevent/Manage DVT/VTE/Air Embolism Risk    05/08/17 1907   Safety Interventions   Medication Review/Management medications reviewed   Support Surgical/Anesthesia Recovery   Venous Thromboembolism Prevent/Manage anticoagulant therapy initiated   Safety Interventions   Bleeding Precautions blood pressure closely monitored;coagulation studies reviewed;gentle oral care promoted;monitored for signs of bleeding       Intervention: Monitor/Manage Fluid Electrolyte Balance    05/08/17 1907   Positioning   Body Position independently positioning   Nutrition Interventions   Fluid/Electrolyte Management electrolyte supplement adjusted

## 2017-05-09 NOTE — PLAN OF CARE
Problem: Goal Outcome Summary  Goal: Goal Outcome Summary  PT 4E: Per chart review, discussion with OT, and follow up with pt - no acute IP PT needs at this time. Pt IND at baseline with all mobility tasks. Currently is SBA without AD and no apparent balance deficits. Reports good support at home from family who is able to assist as needed. Limited primarily by decreased activity tolerance. OT to follow for IP CR needs. Will complete orders at this time. Please re-order if new needs arise.     REC: Home with A PRN and OP phase II CR.

## 2017-05-09 NOTE — PROGRESS NOTES
D: Af-VSS. [place]/yoon cath removed per MD order.   I: As above. Report given to 6C RN.  A/P: Successful transport in W/C to 6C. Pending f/u echo prior to d/c home.  to  the patient

## 2017-05-09 NOTE — PROGRESS NOTES
"[facility]  \"Hello, my name is Catherine Moran , and I am calling from the [facility].  I want to check in and see how you are doing, after leaving the hospital.  You may also receive a call from your Care Coordinator (care team), but I want to make sure you don t have any urgent needs.  I have a couple questions to review with you:     Post-Discharge Outreach                                                    Frannie Roa is a 81 year old female     Follow-up Appointments           Adult [facility] Follow-up and recommended labs and tests       Follow up with primary care provider, Romelia Parker, within 7 days for hospital follow- up. INR per INR clinic, BMP and CBC     Please follow up with valve clinic as arranged.     Please follow up with cardiac rehab      Appointments on [place] and/or [facility] (with [facility] or [facility] provider or service). Call [phone] if you haven't heard regarding these appointments within 7 days of discharge.                       Your next 10 appointments already scheduled            May 18, 2017 11:00 AM CDT   Cardiac Evaluation with Rh Cardiac Rehab 2   [facility] ([facility])     [address] [place] [address], [facility] [address]  [facility] [id number] [phone]                  Jun 08, 2017 9:00 AM CDT   Lab with  LAB    Health Lab ([facility])     [address] [place] [id number] [phone]                  Jun 08, 2017 9:30 AM CDT   Ech Complete with [id number]    Health Echo ([facility])     [address] [place] [id number] [phone]             Care Team:    Patient Care Team       Relationship Specialty Notifications Start End    Romelia Parker MD PCP - General Internal Medicine  3/14/16     Phone: [phone] Fax: [phone]         [facility] [address] [place] " [address] [facility] [address]    Debora Whalen APRN CNS Nurse Coordinator Clinical Nurse Specialist  11/1/16     Phone: [phone] Pager: [phone] Fax: [phone]        [address] [address]    Asim Altamirano MD MD Cardiology  11/1/16     Phone: [phone] Fax: [phone]         [address] [address]    Amee Wallace Nurse Coordinator Cardiology Admissions 5/8/17     Comment:  Structural Heart Team/MitraClip coordinator [phone]    Pager: [phone]          Default Dept: [facility] [[id number]]            Transition of Care Review                                                      Patient contacted an RN for post DC follow up so no duplicate post DC follow up call will be made, message was routed on to care coordinator        Jocelynn Anderson RN        Patient called from hospital room requesting to speak with Dr. Lay' team. She would like call back as soon as possible to discuss her readmission to the hospital. Hospital room telephone number listed under contacts. She also requests Dr. Altamirano be personally notified of her admission. Message sent to Dr. Altamirano and Julia Wallace RN Care Coordinator notified by telephone.                     Plan                                                      Thanks for your time.  Your Care Coordinator may follow-up within the next couple days.  In the meantime if you have questions, concerns or problems call your care team.        Catherine Moran

## 2017-05-09 NOTE — IP AVS SNAPSHOT
Unit 6D Observation [address] [address]    Phone:  [phone]    Fax:  [phone]                                       After Visit Summary   5/9/2017    Frannie Roa    MRN: [id number]           After Visit Summary Signature Page     I have received my discharge instructions, and my questions have been answered. I have discussed any challenges I see with this plan with the nurse or doctor.    ..........................................................................................................................................  Patient/Patient Representative Signature      ..........................................................................................................................................  Patient Representative Print Name and Relationship to Patient    ..................................................               ................................................  Date                                            Time    ..........................................................................................................................................  Reviewed by Signature/Title    ...................................................              ..............................................  Date                                                            Time

## 2017-05-09 NOTE — DISCHARGE INSTRUCTIONS
Going home after Mitraclip  Femoral Access    You have small procedure sites at both groins. You may have small amounts of bruising or discomfort, this is expected. If you develop worse swelling, redness and warmth that does not go away, fever and chills, Increasing numbness in your legs, worsening pain at the site then you need to contact your nurse coordinator.    You may shower, but do not soak in a bath tub or pool or apply lotions, ointments, powder, etc for 3-5 days or until sites look healed.     Activity: No lifting, pushing, pulling more than 10 pounds (examples to avoid: groceries, vacuuming, gardening, golfing): For one week with a procedure through the groin.    After the initial healing process of the access site, we recommend cardiac rehabilitation for all Mitraclip patients. Cardiac rehabilitation will help you:  - Rebuild stamina, strength and balance.  - Learn how to participate in activities safely, as well as help you regain confidence to do so.  - Return to activities of daily living and leisure.  Please contact their central scheduling to arrange at [phone]    We prefer you to follow up with your primary care provider within 2 weeks. You will be arranged to see the Valve clinic in month. At that time you will have a repeat ultrasound of the heart to look at the valve.     Should you have any questions or concerns please contact your assigned Valve nurse coordinator:  Julia [phone] (at the first prompt enter #1, second prompt enter #3, then ask the triage nurse if you can speak with Julia).  Latia [phone]  Stacey [phone]

## 2017-05-10 ENCOUNTER — APPOINTMENT (OUTPATIENT)
Dept: GENERAL RADIOLOGY | Facility: CLINIC | Age: 82
End: 2017-05-10
Attending: INTERNAL MEDICINE
Payer: MEDICARE

## 2017-05-10 ENCOUNTER — CARE COORDINATION (OUTPATIENT)
Dept: CARDIOLOGY | Facility: CLINIC | Age: 82
End: 2017-05-10

## 2017-05-10 VITALS
DIASTOLIC BLOOD PRESSURE: 71 MMHG | TEMPERATURE: 98.3 F | SYSTOLIC BLOOD PRESSURE: 114 MMHG | BODY MASS INDEX: 21.44 KG/M2 | WEIGHT: 121 LBS | RESPIRATION RATE: 18 BRPM | HEIGHT: 63 IN | OXYGEN SATURATION: 97 %

## 2017-05-10 LAB
INR PPP: 1.06 (ref 0.86–1.14)
INTERPRETATION ECG - MUSE: NORMAL
TROPONIN I SERPL-MCNC: 0.05 UG/L (ref 0–0.04)
TROPONIN I SERPL-MCNC: 0.07 UG/L (ref 0–0.04)

## 2017-05-10 PROCEDURE — 84484 ASSAY OF TROPONIN QUANT: CPT | Mod: 91 | Performed by: EMERGENCY MEDICINE

## 2017-05-10 PROCEDURE — 71020 XR CHEST 2 VW: CPT

## 2017-05-10 PROCEDURE — 85610 PROTHROMBIN TIME: CPT | Performed by: EMERGENCY MEDICINE

## 2017-05-10 PROCEDURE — G0378 HOSPITAL OBSERVATION PER HR: HCPCS

## 2017-05-10 PROCEDURE — 93270 REMOTE 30 DAY ECG REV/REPORT: CPT | Performed by: NURSE PRACTITIONER

## 2017-05-10 PROCEDURE — 99213 OFFICE O/P EST LOW 20 MIN: CPT | Mod: GC | Performed by: INTERNAL MEDICINE

## 2017-05-10 PROCEDURE — 36415 COLL VENOUS BLD VENIPUNCTURE: CPT | Performed by: EMERGENCY MEDICINE

## 2017-05-10 PROCEDURE — 25000132 ZZH RX MED GY IP 250 OP 250 PS 637: Mod: GY | Performed by: INTERNAL MEDICINE

## 2017-05-10 PROCEDURE — A9270 NON-COVERED ITEM OR SERVICE: HCPCS | Mod: GY | Performed by: INTERNAL MEDICINE

## 2017-05-10 RX ORDER — NITROGLYCERIN 0.4 MG/1
0.4 TABLET SUBLINGUAL EVERY 5 MIN PRN
Status: DISCONTINUED | OUTPATIENT
Start: 2017-05-10 | End: 2017-05-10 | Stop reason: HOSPADM

## 2017-05-10 RX ORDER — ASPIRIN 81 MG/1
81 TABLET ORAL DAILY
Status: DISCONTINUED | OUTPATIENT
Start: 2017-05-11 | End: 2017-05-10

## 2017-05-10 RX ORDER — WARFARIN SODIUM 7.5 MG/1
7.5 TABLET ORAL
Status: DISCONTINUED | OUTPATIENT
Start: 2017-05-10 | End: 2017-05-10 | Stop reason: HOSPADM

## 2017-05-10 RX ORDER — ZINC SULFATE 50(220)MG
220 CAPSULE ORAL 2 TIMES DAILY
Status: DISCONTINUED | OUTPATIENT
Start: 2017-05-10 | End: 2017-05-10 | Stop reason: HOSPADM

## 2017-05-10 RX ORDER — ACETAMINOPHEN 650 MG/1
650 SUPPOSITORY RECTAL EVERY 4 HOURS PRN
Status: DISCONTINUED | OUTPATIENT
Start: 2017-05-10 | End: 2017-05-10 | Stop reason: HOSPADM

## 2017-05-10 RX ORDER — ALUMINA, MAGNESIA, AND SIMETHICONE 2400; 2400; 240 MG/30ML; MG/30ML; MG/30ML
15-30 SUSPENSION ORAL EVERY 4 HOURS PRN
Status: DISCONTINUED | OUTPATIENT
Start: 2017-05-10 | End: 2017-05-10 | Stop reason: HOSPADM

## 2017-05-10 RX ORDER — ASPIRIN 81 MG/1
81 TABLET, CHEWABLE ORAL DAILY
Status: DISCONTINUED | OUTPATIENT
Start: 2017-05-10 | End: 2017-05-10 | Stop reason: HOSPADM

## 2017-05-10 RX ORDER — ASPIRIN 81 MG/1
162 TABLET, CHEWABLE ORAL ONCE
Status: COMPLETED | OUTPATIENT
Start: 2017-05-10 | End: 2017-05-10

## 2017-05-10 RX ORDER — LIDOCAINE 40 MG/G
CREAM TOPICAL
Status: DISCONTINUED | OUTPATIENT
Start: 2017-05-10 | End: 2017-05-10 | Stop reason: HOSPADM

## 2017-05-10 RX ORDER — ACETAMINOPHEN 325 MG/1
650 TABLET ORAL EVERY 4 HOURS PRN
Status: DISCONTINUED | OUTPATIENT
Start: 2017-05-10 | End: 2017-05-10 | Stop reason: HOSPADM

## 2017-05-10 RX ORDER — NALOXONE HYDROCHLORIDE 0.4 MG/ML
.1-.4 INJECTION, SOLUTION INTRAMUSCULAR; INTRAVENOUS; SUBCUTANEOUS
Status: DISCONTINUED | OUTPATIENT
Start: 2017-05-10 | End: 2017-05-10 | Stop reason: HOSPADM

## 2017-05-10 RX ADMIN — ASPIRIN 81 MG CHEWABLE TABLET 162 MG: 81 TABLET CHEWABLE at 01:49

## 2017-05-10 RX ADMIN — ZINC SULFATE CAP 220 MG (50 MG ELEMENTAL ZN) 220 MG: 220 (50 ZN) CAP at 08:31

## 2017-05-10 RX ADMIN — METOPROLOL TARTRATE 12.5 MG: 25 TABLET, FILM COATED ORAL at 08:31

## 2017-05-10 RX ADMIN — ASPIRIN 81 MG CHEWABLE TABLET 81 MG: 81 TABLET CHEWABLE at 08:30

## 2017-05-10 NOTE — DISCHARGE INSTRUCTIONS
"  * Heart Palpitations    Palpitations refers to the feeling that your heart is beating hard, fast or irregular. Some people describe it as \"pounding\" or \"skipped beats\". Palpitations may occur in persons with heart disease, but can also occur in healthy persons. Your doctor does not believe that anything dangerous is causing your symptoms at this time.  Heart-Related Causes:    Arrhythmia (a change from the heart's normal rhythm)    Disease of the heart valves  Non-Heart-Related Causes:    Certain medicines (such as asthma inhalers and decongestants)    Some herbal supplements, energy drinks and pills, and weight loss pills    Illegal stimulant drugs (such as cocaine, crank, methamphetamine, PCP)    Caffeine, alcohol and tobacco    Medical conditions such as thyroid disease, anemia, anxiety and panic disorder  Sometimes the cause cannot be found.  Home Care:  1. Avoid excess caffeine, alcohol, tobacco and any stimulant drugs.  2. Tell your doctor about any prescription or over-the-counter or herbal medicines you take.  Follow Up  with your doctor or as advised by our staff.  Get Prompt Medical Attention  if any of the following occur together with palpitations:    Weakness, dizziness, light-headed or fainting    Chest pain or shortness of breath    Rapid heart rate (over 120 beats per minute, at rest)    Palpitations that lasts over 20 minutes    Weakness of an arm or leg or one side of the face    Difficulty with speech or vision    [id number] [employer]. [address] [place] [id number]. All rights reserved. This information is not intended as a substitute for professional medical care. Always follow your healthcare professional's instructions.      Please follow previous post procedure discharge instructions for specific precautions and instructions.       "

## 2017-05-10 NOTE — PROGRESS NOTES
"ED to Floor Handoff      S:  Frannie Roa is a 81 year old female who speaks English and lives with a spouse,  in a home  They arrived in the ED by car from home with a complaint of Tachycardia    Initial vitals were:   BP: 134/78  Heart Rate: 108  Temp: 98.4  F (36.9  C)  Resp: 10  Height: 160 cm (5' 3\")  Weight: 54.9 kg (121 lb)  SpO2: 98 %  Allergies:   Allergies   Allergen Reactions     Penicillins      Sulfa Drugs    .  The meds given in the ED and their home medications are:   Current Facility-Administered Medications   Medication     Warfarin Therapy Reminder (Check START DATE - warfarin may be starting in the FUTURE)     Current Outpatient Prescriptions   Medication     ASPIRIN PO     warfarin (COUMADIN) 5 MG tablet     metoprolol (LOPRESSOR) 25 MG tablet     zinc sulfate (ZINCATE) 220 MG capsule     dorzolamide-timolol (COSOPT) 2-0.5 % ophthalmic solution     travoprost Z, benzalkonium, (TRAVATAN Z) 0.004 % ophthalmic solution     Social demographics are   Social History     Social History     Marital status:      Spouse name: N/A     Number of children: N/A     Years of education: N/A     Social History Main Topics     Smoking status: Never Smoker     Smokeless tobacco: Never Used     Alcohol use 0.0 oz/week     0 Standard drinks or equivalent per week      Comment: 3 glasses wine/week     Drug use: No     Sexual activity: Yes     Partners: Male     Other Topics Concern     Parent/Sibling W/ Cabg, Mi Or Angioplasty Before 65f 55m? No     Social History Narrative       B:   The patient has been ill for 5 hours(s) and during this time the symptoms have decreased.  In the ED was diagnosed with   Final diagnoses:   Elevated troponin    Infection/sepsis suspected:No Isolation type; No active isolations   A:   In the ED these meds were given:   Medications   Warfarin Therapy Reminder (Check START DATE - warfarin may be starting in the FUTURE) (not administered)     Drips running?  No  Labs results " "  Labs Ordered and Resulted from Time of ED Arrival Up to the Time of Departure from the ED   COMPREHENSIVE METABOLIC PANEL - Abnormal; Notable for the following:        Result Value    Glucose 119 (*)     All other components within normal limits   TROPONIN I - Abnormal; Notable for the following:     Troponin I ES 0.075 (*)     All other components within normal limits   CBC WITH PLATELETS DIFFERENTIAL   LACTIC ACID WHOLE BLOOD   NT PROBNP INPATIENT   INR   DISCHARGE PLANNING     Imaging Studies:   Recent Results (from the past 24 hour(s))   XR Chest Port 1 View    Narrative    EXAM: XR CHEST PORT 1 VW  5/9/2017 2:51 AM      HISTORY: POD #1 S/P Mitral valve repair (Mitraclip) - Degeneration of  Mitral Valve     COMPARISON: CT 3/9/2017    FINDINGS: Mitraclip present. Mild perihilar and reticular opacities.  Cardiac silhouette is unremarkable. No pleural effusions. No  pneumothorax.      Impression    IMPRESSION:   1. Mild perihilar and reticular opacities, which likely represents  mild pulmonary edema.  2. New Mitraclip.     I have personally reviewed the examination and initial interpretation  and I agree with the findings.    SEAN ASCENCIO MD     Recent vital signs /60  Temp 98.4  F (36.9  C) (Oral)  Resp 21  Ht 1.6 m (5' 3\")  Wt 54.9 kg (121 lb)  SpO2 98%  BMI 21.43 kg/m2  Cardiac Rhythm: ,   Cardiac  Cardiac Rhythm: Sinus tachycardia  Abnormal labs/tests/findings requiring intervention:---  Pain control: pt had none  Nausea control: pt had none  R:   Transfer assistance needed: Independent  Family present during ED course? Yes   Family currently present? No  Pt needs tele? Yes  Code Status: Full code    Tasks needing to be completed:---    Malou Alas  ascom-- [phone] [facility]  [phone] [facility]        "

## 2017-05-10 NOTE — PLAN OF CARE
Problem: Discharge Planning  Goal: Discharge Planning (Adult, OB, Behavioral, Peds)  Outpatient/Observation goals to be met before discharge home:     - Serial troponins and stress test complete: YES, troponins trending down, no stress test ordered  - Seen and cleared by consultant if applicable: NO  - Adequate pain control on oral analgesia: YES, no c/o pain  - Vital signs normal or at patient baseline: YES  - Safe disposition plan has been identified: YES  - Nurse to notify provider when observation goals have been met and patient is ready for discharge.     Patient is A/O X 4, denies any pain, VSS, NSR in the 70-80's. Will continue to monitor.

## 2017-05-10 NOTE — PROGRESS NOTES
Obs goals   - Serial troponins and stress test complete. - no  - Seen and cleared by consultant if applicable - no  - Adequate pain control on oral analgesia - yes, denies pain   - Vital signs normal or at patient baseline - yes, all vitals normal   - Safe disposition plan has been identified - yes, can discharge home with      Admitted to floor 0115. Denies pain and nausea. UAL. Here for cardiac workup.

## 2017-05-10 NOTE — H&P
INTERNAL MEDICINE HISTORY & PHYSICAL   Frannie oRa ([id number]) admitted on 5/9/2017  Primary care provider: Romelia Parker    Chief Complaint:  Tachycardia     History of Present Illness:   Frannie Roa is a 81 year old female with history of PMHx of myxomatous mitral regurgitation, atrial fibrillation on chronic anticoagulation, and HLD who was discharged yesterday after elective mitral clip yesterday (5/9/2017); she was overall doing well when she suddenly started feeling palpitations and found her HR to be in 160 so she immediately came to ER; by the time she was in her car she started feeling the heart rate slowing down. In ER she was further evaluated and HR was in 66 and back to sinus rhythm in EKG.   She does have h/o afib 2 months back which got better with IV metoprolol; she was transitioned to oral metoprolol and coumadin.   She was taking aspirin 325 mg daily and started on warfarin from today  Her preop and post op course was uneventful and was d/c after a day.    Already feeling better at the time of encounter and in sinus rhythm    Further evaluated and found to have troponin of  0.075; vitals was stable in ED            Past Medical History:   Patient Active Problem List   Diagnosis     Osteoporosis     Systolic murmur     Myxomatous mitral valve regurgitation     SVT (supraventricular tachycardia) (H)     Hyperlipidemia     Advanced directives, counseling/discussion     Chronic systolic congestive heart failure (H)     Long-term (current) use of anticoagulants [Z79.01]     Atrial fibrillation (H) [I48.91]     Status post coronary angiogram     Mitral regurgitation       Past Surgical History:   Past Surgical History:   Procedure Laterality Date     ANGIOGRAM  04/23/2017     BIOPSY      breast     ENT SURGERY      T & A     EYE SURGERY      Laser Trabelectomy for glaucoma     ORTHOPEDIC SURGERY      Right ankle cyst removal requiring ORIF     PERCUTANEOUS MITRAL VALVE REPAIR  N/A 2017    Procedure: PERCUTANEOUS MITRAL VALVE REPAIR ANESTHESIA;  Percutaneous Mitral Valve Repair (Mitraclip);  Surgeon: Shailesh Lay MD;  Location: [facility] OR     PHACOEMULSIFICATION CLEAR CORNEA W/ STANDARD IOL IMPLANT, ENDOSCOPIC CYCLOPHOTOCOAGULATION, COMBINED  2011    Procedure:COMBINED PHACOEMULSIFICATION CLEAR CORNEA WITH STANDARD INTRAOCULAR LENS IMPLANT, ENDOSCOPIC CYCLOPHOTOCOAGULATION; LEFT EYE PHACOEMULSIFICATION CLEAR CORNEA WITH STANDARD INTRAOCULAR LENS IMPLANT, ENDOSCOPIC CYCLOPHOTOCOAGULATION ; Surgeon:ELOY WARE; Location[facility]       Medications (outpatient):    Current Facility-Administered Medications on File Prior to Encounter:  [DISCONTINUED] metoprolol (LOPRESSOR) half-tab 12.5 mg   [] 0.9% sodium chloride BOLUS   [] atropine injection 0.5 mg   [] fentaNYL Citrate (PF) (SUBLIMAZE) injection 25-50 mcg   [] midazolam (VERSED) injection 0.5-1 mg   [] naloxone (NARCAN) injection 0.2-0.4 mg   [] flumazenil (ROMAZICON) injection 0.2 mg   [DISCONTINUED] lactated ringers infusion   [DISCONTINUED] ondansetron (ZOFRAN-ODT) ODT tab 4 mg   [DISCONTINUED] ondansetron (ZOFRAN) injection 4 mg   [DISCONTINUED] prochlorperazine (COMPAZINE) injection 5 mg   [DISCONTINUED] dorzolamide-timolol (COSOPT) ophthalmic solution 1 drop   [DISCONTINUED] zinc sulfate (ZINCATE) capsule 220 mg   [DISCONTINUED] metoprolol (LOPRESSOR) half-tab 12.5 mg   [DISCONTINUED] lidocaine 1 % 1 mL   [DISCONTINUED] lidocaine (LMX4) kit   [DISCONTINUED] sodium chloride (PF) 0.9% PF flush 3 mL   [DISCONTINUED] sodium chloride (PF) 0.9% PF flush 3 mL   [DISCONTINUED] nitroglycerin (NITROSTAT) sublingual tablet 0.4 mg   [DISCONTINUED] aspirin EC EC tablet 81 mg   [DISCONTINUED] HOLD:  Metformin and metformin containing medications if patient received IV contrast   [DISCONTINUED] naloxone (NARCAN) injection 0.1-0.4 mg   [DISCONTINUED] hydrALAZINE (APRESOLINE) injection 10 mg    [DISCONTINUED] acetaminophen (TYLENOL) tablet 325-650 mg   [DISCONTINUED] ondansetron (ZOFRAN-ODT) ODT tab 4 mg   [DISCONTINUED] ondansetron (ZOFRAN) injection 4 mg   [DISCONTINUED] senna-docusate (SENOKOT-S;PERICOLACE) 8.6-50 MG per tablet 1-2 tablet   [DISCONTINUED] potassium chloride SA (K-DUR/KLOR-CON M) CR tablet 20-40 mEq   [DISCONTINUED] potassium chloride (KLOR-CON) Packet 20-40 mEq   [DISCONTINUED] potassium chloride 10 mEq in 100 mL intermittent infusion   [DISCONTINUED] potassium chloride 10 mEq in 100 mL intermittent infusion with 10 mg lidocaine   [DISCONTINUED] potassium chloride 20 mEq in 50 mL intermittent infusion   [DISCONTINUED] magnesium sulfate 2 g in NS intermittent infusion ([employer] or [facility] Cmpd)   [DISCONTINUED] magnesium sulfate 4 g in 100 mL sterile water (premade)   [DISCONTINUED] Travoprost 0.004 % SOLN 1 drop   [DISCONTINUED] Warfarin Therapy Reminder (Check START DATE - warfarin may be starting in the FUTURE)     Current Outpatient Prescriptions on File Prior to Encounter:  ASPIRIN PO Take 81 mg by mouth daily   warfarin (COUMADIN) 5 MG tablet Take 1 tablet (5 mg) by mouth daily (Patient taking differently: Take 7.5 mg by mouth daily )   metoprolol (LOPRESSOR) 25 MG tablet Take 0.5 tablets (12.5 mg) by mouth 2 times daily   zinc sulfate (ZINCATE) 220 MG capsule Take 220 mg by mouth 2 times daily   dorzolamide-timolol (COSOPT) 2-0.5 % ophthalmic solution Place 1 drop Into the left eye 2 times daily    travoprost Z, benzalkonium, (TRAVATAN Z) 0.004 % ophthalmic solution Place 1 drop into both eyes every evening        Allergy:  Allergies   Allergen Reactions     Penicillins      Sulfa Drugs        Social History:   Social History     Social History     Marital status:      Spouse name: N/A     Number of children: N/A     Years of education: N/A     Occupational History     Not on file.     Social History Main Topics     Smoking status: Never Smoker     Smokeless tobacco: Never  "Used     Alcohol use 0.0 oz/week     0 Standard drinks or equivalent per week      Comment: 3 glasses wine/week     Drug use: No     Sexual activity: Yes     Partners: Male     Other Topics Concern     Parent/Sibling W/ Cabg, Mi Or Angioplasty Before 65f 55m? No     Social History Narrative       Family History:  Family History   Problem Relation Age of Onset     Colon Cancer Mother      Myocardial Infarction Maternal Grandmother      Myocardial Infarction Paternal Grandfather      Other Cancer Sister      Leukemia Daughter        ROS:   Review Of Systems  ROS: 10 point ROS neg other than the symptoms noted above in the HPI.      Objective:  Physical exam:  /61  Temp 98.4  F (36.9  C) (Oral)  Resp 17  Ht 1.6 m (5' 3\")  Wt 54.9 kg (121 lb)  SpO2 96%  BMI 21.43 kg/m2  Wt Readings from Last 2 Encounters:   05/09/17 54.9 kg (121 lb)   05/08/17 56 kg (123 lb 7.3 oz)     No intake or output data in the 24 hours ending 05/10/17 0036    Physical Exam:   General: Pleasant female, NAD  HEENT: No Scleral icterus. NCAT, MMM. PERRL, EOMI.  Cardiac: Normal rate, regular rhythm. Presence of soft systolic murmur. Normal S1, S2.  Pulm: CTAB, no wheezes, or crackles. Normal respiratory effort  Abd: Soft, non distended, non tender abdomen. No hepatosplenomegaly.  Skin: No jaundice, No rash  Extremities: No LE edema  Joints: No inflammation noted on joints  Neuro: A&Ox3, no focal deficits    Labs (Past three days):  CBC  Recent Labs  Lab 05/09/17  2025 05/09/17  0353 05/08/17  1341 05/08/17  1243 05/08/17  0625   WBC 8.7 7.8 5.7  --  6.3   RBC 4.05 3.57* 3.70*  --  4.19   HGB 11.8 10.2* 10.9* 10.3* 12.3   HCT 37.1 32.0* 33.2*  --  38.1   MCV 92 90 90  --  91   MCH 29.1 28.6 29.5  --  29.4   MCHC 31.8 31.9 32.8  --  32.3   RDW 14.2 14.0 14.1  --  14.0    227 215  --  259       BMP  Recent Labs  Lab 05/09/17  2025 05/09/17  0353 05/08/17  1341 05/08/17  1243 05/08/17  0625    136 141 140 139   POTASSIUM 3.9 3.8 " 3.8 3.6 4.0   CHLORIDE 104 104 111*  --  105   CO2 27 24 23  --  26   ANIONGAP 9 8 8  --  8   * 96 85 93 101*   BUN 15 13 14  --  19   CR 0.64 0.57 0.52  --  0.66   GFRESTIMATED 88 >90Non  GFR Calc >90Non  GFR Calc  --  86   GFRESTBLACK >90African American GFR Calc >90African American GFR Calc >90African American GFR Calc  --  >90African American GFR Calc   NICOLE 8.6 8.2* 7.2*  --  8.5   MAG  --  2.1 2.1  --   --    PHOS  --  3.3 3.6  --   --         INR  Recent Labs  Lab 05/09/17 2025 05/09/17  1232 05/08/17  0625   INR 1.07 1.12 1.02       Liver panel  Recent Labs  Lab 05/09/17 2025 05/08/17  1341   PROTTOTAL 7.1 5.5*   ALBUMIN 3.4 2.7*   BILITOTAL 0.6 0.7   ALKPHOS 81 62   AST 20 19   ALT 29 29       Urinalysis  No results for input(s): COLOR, APPEARANCE, URINEGLC, URINEBILI, URINEKETONE, SG, UBLD, URINEPH, PROTEIN, UROBILINOGEN, NITRITE, LEUKEST, RBCU, WBCU in the last 92123 hours.    Cultures  NGTD  Imaging/procedure results:  # CXR:  IMPRESSION:   1. Mild perihilar and reticular opacities, which likely represents  mild pulmonary edema.  2. New Mitraclip.       EKG: sinus rhythm      ASSESSMENT & PLAN :  80 y/o M with PMH of myxomatous mitral regurgitation, atrial fibrillation on chronic anticoagulation, and HLD who was discharged yesterday after elective mitral clip yesterday (5/9/2017);    #) Myxomatous mitral regurgitation  #) s/p mitral clip  - uneventful procedure and was d/c after a day  -brief episode of afib but resolved now  -admit to observation and watching vitals and troponin trend  -will repeat EKG in am      #) Atrial fibrillation  Back to sinus when she came to ER  Will observe her overnight  Continue with metoprolol 12.5 mg PO BID and coumadin        FEN: cardiac diet  Prophylaxis:  DVT: on coumadin    Disposition: pending further evaluation by primary team  Code Status: FULL CODE    Trixie YEE  PGY 3 Internal Medicine  P;[phone]    Patient  was discussed with cardiology fellow Dr Moya    ATTENDING NOTE:  Patient has been seen and evaluated by me on 05/10/2017. I have reviewed the H&P.  Please refer to it for additional details.  I have reviewed today's vital signs, medications, labs, and imaging results.  I have reviewed and edited, as necessary, the history, review of systems, physical examination, and assessment and plan.  Frannie Roa is a 81 year old female with risk factor profile (-) HTN, (-) DM, (+) hypercholesterolemia, (-) tobacco use, (-) fam Hx premature CAD, Hx mild CAD (25% LAD), normal LV function (LVEF 65-70%), severe MR (myxomatous A2, P2), treated with single Marisa Clip on 5/8/2017 with reduction of MR to mild intensity.  Patient had ECHO yesterday confirming mild MR and normal LV function.  She was discharged yesterday afternoon in NSR but returned last night with palpitations in the absence of symptoms to suggest either CHF or ACS.   In the ER, she was in NSR at 108.  There were nonspecific ST changes, no Q waves, no ST elevation.   Troponin 0.07.  She was admitted for R/O ACS.   Patient asymptomatic overnight.  I suspect the troponin is related to the atrial septostomy and would not check further troponins.  Given the fact that she only had mild CAD on her angiogram, she will remain on ASA.     Lab Results   Component Value Date    TROPI 0.053 (H) 05/10/2017    TROPI 0.071 (H) 05/10/2017    TROPI 0.075 (H) 05/09/2017     I suspect she had PAF prior to ER arrival and was symptomatic.  She is appropriately anticoagulated on coumadin.   She is beta blocked on Lopressor 12.5 mg BID.  I would plan on discharging her today with Cardionet today.   Follow up with Dr. Lay in one month.

## 2017-05-10 NOTE — PHARMACY-ANTICOAGULATION SERVICE
Clinical Pharmacy - Warfarin Dosing Consult     Pharmacy has been consulted to manage this patient s warfarin therapy.  Indication: Atrial Fibrillation  Therapy Goal: INR 2-2.5  Warfarin Prior to Admission: Yes  Warfarin PTA Regimen: 7.5 mg daily  Significant drug interactions: none    INR   Date Value Ref Range Status   05/10/2017 1.06 0.86 - 1.14 Final   05/09/2017 1.07 0.86 - 1.14 Final       Recommend warfarin 7.5 mg today.  Pharmacy will monitor Frannie Roa daily and order warfarin doses to achieve specified goal.      Please contact pharmacy as soon as possible if the warfarin needs to be held for a procedure or if the warfarin goals change.

## 2017-05-10 NOTE — PROGRESS NOTES
Patient called from hospital room requesting to speak with Dr. Lay' team.  She would like call back as soon as possible to discuss her readmission to the hospital.  Hospital room telephone number listed under contacts. She also requests Dr. Altamirano be personally notified of her admission.  Message sent to Dr. Altamirano and Julia Wallace RN Care Coordinator notified by telephone.

## 2017-05-10 NOTE — ED PROVIDER NOTES
"  History     Chief Complaint   Patient presents with     Tachycardia     HPI  Frannie Roa is a 81 year old female with a history of myxomatous mitral regurgitation, status-post Mitraclip procedure yesterday (5/8/17), atrial fibrillation (anticoagulated on Coumadin), and hyperlipidemia who presents with tachycardia. The patient reports that she was monitored overnight following her Mitraclip procedure and LAKEISHA yesterday, and was discharged home this morning after an echocardiogram and EKGs. This evening, she developed palpitations with a racing heart rate, and fount her heart rate to be at 160 bpm. She contacted the cardiology clinic and was referred here to the ED for further evaluation. Currently, she denies any chest pain or shortness of breath, and notes that she has felt her heart rate slow down. She has taken metoprolol twice today, and also restarted her Coumadin today. The patient reports that she has had only 1 episode of A. fib about 2 months ago, at which time she was started on Lovenox, then transitioned to Coumadin.     I have reviewed the Medications, Allergies, Past Medical and Surgical History, and Social History in the Epic system.    Review of Systems   Constitutional: Negative.    HENT: Negative.    Respiratory: Negative for shortness of breath.    Cardiovascular: Positive for palpitations (heart racing). Negative for chest pain.   Genitourinary: Negative.    All other systems reviewed and are negative.      Physical Exam   BP: 134/78  Heart Rate: 108  Temp: 98.4  F (36.9  C)  Resp: 10  Height: 160 cm (5' 3\")  Weight: 54.9 kg (121 lb)  SpO2: 98 %  Physical Exam   Constitutional: She is oriented to person, place, and time. She appears well-developed and well-nourished.   HENT:   Head: Atraumatic.   Right Ear: External ear normal.   Left Ear: External ear normal.   Eyes: Conjunctivae and EOM are normal. No scleral icterus.   Neck: Normal range of motion. Neck supple.   Cardiovascular: Normal " rate and regular rhythm.    Pulmonary/Chest: Effort normal. No respiratory distress.   Abdominal: Soft. There is no tenderness. There is no rebound and no guarding.   Musculoskeletal: Normal range of motion. She exhibits no edema or tenderness.   Neurological: She is alert and oriented to person, place, and time.   Skin: Skin is warm and dry. No rash noted.   Psychiatric: She has a normal mood and affect. Her behavior is normal.   Nursing note and vitals reviewed.      ED Course     ED Course     Procedures     8:02 PM  The patient was seen and examined by Dr. Crawford in Room 2.               EKG Interpretation:      Interpreted by Estela Crawford  Time reviewed: 835pm  Symptoms at time of EKG: none   Rhythm: normal sinus   Rate: tachycardia rate 104  Axis: normal  Ectopy: none  Conduction: normal  ST Segments/ T Waves: diffuse st depressions in the lateral leads and inferior leads  Q Waves: none  Comparison to prior: new st depressions    Clinical Impression: new st depressions    Labs Ordered and Resulted from Time of ED Arrival Up to the Time of Departure from the ED   COMPREHENSIVE METABOLIC PANEL - Abnormal; Notable for the following:        Result Value    Glucose 119 (*)     All other components within normal limits   TROPONIN I - Abnormal; Notable for the following:     Troponin I ES 0.075 (*)     All other components within normal limits   CBC WITH PLATELETS DIFFERENTIAL   LACTIC ACID WHOLE BLOOD   NT PROBNP INPATIENT   INR   DISCHARGE PLANNING            Assessments & Plan (with Medical Decision Making)   81 year old female 1 day post-op from a Mitraclip procedure here with palpitations and tachycardia in the ED. She is asymptomatic. Her vital signs are normal with the exception of her heart rate of 110 bpm. EKG captured this rate and it was sinus tachycardia. She does however have diffuse ST depressions. We placed an IV and kept her on the cardiac monitor. Her labs were remarkable for a elevated troponin to  0.075. The rest of her chem panel and CBC were unremarkable. Her INR is subthreapeutic at 1.07. I spoke with the Cardiology staff and fellow. They came to evaluate the patient. They want to admit the patient for observation and serial troponins. Repeat EKG after patient's heart rate normalized and ST depressions have now resolved. Patient is agreeable with the plan of admission.     This part of the medical record was transcribed by John Hairston, Medical Scribe, from a dictation done by Estela Crawford MD.     I have reviewed the nursing notes.  I have reviewed the findings, diagnosis, plan and need for follow up with the patient.    New Prescriptions    No medications on file       Final diagnoses:   Elevated troponin     I, Jonathon Nicole, am serving as a trained medical scribe to document services personally performed by Estela Crawford MD, based on the provider's statements to me.   I, Estela Crawford MD, was physically present and have reviewed and verified the accuracy of this note documented by Jonathon Nicole.     5/9/2017   [facility], EMERGENCY DEPARTMENT     Estela Crawford MD  05/09/17 1115

## 2017-05-10 NOTE — ED NOTES
"80 yo female presents ambulatory to triage with c/o tachycardia. Patient had a mitral clip procedure yesterday, was discharged from the hospital this evening. Patient was eating dinner and noticed a \"fast & pounding heart rate\".   Patient notes feeling nauseas, lightheaded/dizzy when it happened & could feel her heart slow down on the way to the hospital.   "

## 2017-05-10 NOTE — PLAN OF CARE
Problem: Goal Outcome Summary  Goal: Goal Outcome Summary  Outcome: No Change  Obs goals   - Serial troponins and stress test complete. - no, in progress   - Seen and cleared by consultant if applicable - no  - Adequate pain control on oral analgesia - yes, denies pain   - Vital signs normal or at patient baseline - yes, all vitals normal   - Safe disposition plan has been identified - yes, can discharge to home with       Denies pain and nausea. UAL. On tele. Soft end systolic murmer detected. Here for cardiac workup.

## 2017-05-10 NOTE — PROGRESS NOTES
Discharge instructions reviewed, understood, and signed by patient. VSS, PIV removed, medications reviewed and understood, patient has all belongings. Patient will leave via wheelchair with transportation.

## 2017-05-11 ENCOUNTER — ANTICOAGULATION THERAPY VISIT (OUTPATIENT)
Dept: ANTICOAGULATION | Facility: CLINIC | Age: 82
End: 2017-05-11

## 2017-05-11 DIAGNOSIS — Z79.01 LONG-TERM (CURRENT) USE OF ANTICOAGULANTS: ICD-10-CM

## 2017-05-11 DIAGNOSIS — I48.91 ATRIAL FIBRILLATION, UNSPECIFIED TYPE (H): ICD-10-CM

## 2017-05-11 NOTE — PROGRESS NOTES
Dates of hospitalization: 5/8 to 5/9 and readmitted 5/9 to 5/10  Reason for hospitalization: Had mitraclip surgery 5/8. Readmitted for observation due to brief episode of a-fib.  Procedures performed: Mitraclip on 5/8  Vitamin K or FFP administered? No  Inpatient warfarin doses added to calendar? Yes  Medication changes at discharge: None  Warfarin dosing after DC: 7.5 mg daily  Patient discharged on Lovenox? No  Next INR date: Not specified  Where is the patient discharging to? (home, TCU, staying locally, etc.): Home  Will patient have home care? No     Spoke with Frannie. She reports having taken 5 mg daily starting on 5/9. She was unsure if she took a dose on 5/10. She was taking 5 mg because that was what her discharge papers stated. Advised increasing today to 7.5 daily based on the doses she was previously requiring. She states there is no bleeding at the surgical site. Will check her INR Monday.

## 2017-05-11 NOTE — H&P
.      [facility]  [address] [address]  p: [phone]    Discharge Summary: Cardiology Service    Frannie Estrada MRN# [id number]   YOB: 1935 Age: 81 year old       Admission Date: 2017  Discharge Date: 05/10/17      Discharge Diagnoses:  1. Myxomatous mitral regurgitation s/p mitral clip  2. Atrial fibrillation       Pertinent Procedures:  1. Mitraclip procedure  2. Echocardiogram    Consults:  PT, OT, nutrition    Imaging with results:  Echocardiogram  Recent Results (from the past 4320 hour(s))   Echocardiogram Limited    Narrative    [id number]  [id number]  [id number]  [id number]^JUDITH^TUNDE^           [facility],[place]  Echocardiography Laboratory  [address] [place] [address]     Name: FRANNIE ESTRADA  MRN: [id number]  : 1935  Study Date: 2017 11:52 AM  Age: 81 yrs  Gender: Female  [place] [facility] [place] For Study: S/P MV clip  History: S/P MV clip  Ordering Physician: TUNDE WONG  Referring Physician: MILDRED ANTONIO  Performed By: Jazzmine Cao RDCS     BSA: 1.6 m2  Height: 63 in  Weight: 123 lb  BP: 99/51 mmHg  _____________________________________________________________________________  __        Procedure  Limited Portable Echo Adult.  _____________________________________________________________________________  __        Interpretation Summary  Global and regional left ventricular function is normal with an EF of 55-60%.  S/p edge to edge mitral valve repair with a Mitraclip device. The device is  well anchored and stable. There is mild residual mitral regurgitation. The  mean diastolic gradient is 6 mmHg at a herat rate of 69 bpm.  Right ventricular systolic pressure is 32 mmHg above the right atrial  pressure.  The inferior vena cava is normal. Estimated mean right atrial pressure is <3  mmHg.  No pericardial effusion is  present.  _____________________________________________________________________________  __        Left Ventricle  Left ventricular size is normal. Left ventricular wall thickness is normal.  Global and regional left ventricular function is normal with an EF of 55-60%.  The LV diastolic function cannot be evaluated. No regional wall motion  abnormalities are seen.     Right Ventricle  The right ventricle is normal size. Global right ventricular function is  normal.     Atria  Severe biatrial enlargement is present.     Mitral Valve  Severe mitral annular calcification is present. S/p edge to edge mitral valve  repair with a Mitraclip device. The device is well anchored and stable. There  is mild residual mitral regurgitation. The mean diastolic gradient is 6 mmHg  at a herat rate of 69 bpm.        Aortic Valve  Mild aortic valve sclerosis is present.     Tricuspid Valve  Mild tricuspid insufficiency is present. Right ventricular systolic pressure  is 32mmHg above the right atrial pressure.     Pulmonic Valve  The pulmonic valve is normal.     Vessels  The aorta root is normal. The inferior vena cava is normal. Estimated mean  right atrial pressure is <3 mmHg.     Pericardium  No pericardial effusion is present.        Compared to Previous Study  Compared with prior, a mitraclip device is in place.  _____________________________________________________________________________  __  MMode/2D Measurements & Calculations     IVSd: 0.64 cm  LVIDd: 4.7 cm  LVIDs: 3.0 cm  LVPWd: 0.73 cm  FS: 36.6 %  EDV(Teich): 103.0 ml  ESV(Teich): 34.7 ml  LV mass(C)d: 101.3 grams  LV mass(C)dI: 64.4 grams/m2  MV Diam: 3.7 cm  LA Volume (BP): 84.0 ml  LA Volume Index (BP): 53.5 ml/m2        Doppler Measurements & Calculations  MV E max justyn: 167.9 cm/sec  MV A max justyn: 154.7 cm/sec  MV E/A: 1.1     MV max P.5 mmHg  MV mean P.6 mmHg  MV V2 VTI: 65.8 cm  MV Flow area(1diam): 10.9 cm2  MV P1/2t max justyn: 186.7 cm/sec  MV P1/2t: 61.4  msec  MVA(P1/2t): 3.6 cm2  MV dec slope: 891.0 cm/sec2  MV dec time: 0.30 sec  Ao V2 max: 219.0 cm/sec  Ao max P.0 mmHg  Ao V2 mean: 166.2 cm/sec  Ao mean P.1 mmHg  Ao V2 VTI: 46.2 cm  SV(MV 1 diam): 720.5 ml  SI(MV 1 diam): 458.1 ml/m2  TR max justyn: 281.6 cm/sec  TR max P.7 mmHg           _____________________________________________________________________________  __           Report approved by: Grisel Mcelroy 2017 01:11 PM      Echocardiogram Complete    Narrative     Other imaging studies:  Mitraclip procedure:  SUMMARY:  1. Access was obtained in the right and left common femoral veins (left for possible central access if needed).  The venous site used for MitraClip delivery was pre-closed with two Perclose Proglide percutaneous suture devices.   2. Access to the left atrium was achieved using a standard transseptal puncture technique with fluoroscopic and transesophageal echocardiographic guidance using a [employer] [facility]  RF transseptal guiding sheath and needle. A pigtail wire was advanced into the left atrium and secured in place. Following transseptal puncture intravenous heparin was administered to achieve and maintain an activated clotting time (ACT) of >300.   3. Pre-dilation of the femoral access site was performed with ascending size dilators to allow for insertion of a 24 English steerable transvenous sheath. The delivery sheath was advanced across the interatrium septum without difficulty. The left atrial pressure was measured as 12/30/12 mmHg (a,v,mean). A pigtail catheter was positioned in the left atrium for continuous LA pressure monitoring.  4. The MitraClip Clip Delivery System was advanced into the left atrium. Under multiplane LAKEISHA guidance, the MitraClip was oriented appropriately over the mitral valve. The clip was then opened and the arms were positioned perpendicularly to the leaflets using the en face 3D LAKEISHA projection. Once properly oriented, the clip was  advanced to the left ventricle, and the clip delivery system was then pulled back and the leaflets were grasped by dropping the grippers. After confirmation of adequate grasping of the leaflets, the arms were closed and reduction of mitral regurgitation was assessed and the possibility of iatrogenic mitral stenosis was evaluated. Once an adequate reduction in mitral regurgitation was confirmed with evaluation of gradients and direct echocardiographic visualization, the clip was successfully deployed. The clip was placed along the lateral aspect of A2/P2, but moderate MR remained so the clip was moved to the medial aspect of A2/P2 with resultant mild MR.    5. The delivery system and sheath were removed and optimal hemostasis was achieved with deployment of the Perclose sutures.   6. Mitral regurgitation was confirmed to be mild following the procedure and the mean gradient was 3 mmHg.     Brief HPI:  82 y/o M with PMH of myxomatous mitral regurgitation, atrial fibrillation on chronic anticoagulation, and HLD who was discharged yesterday after elective mitral clip yesterday (5/9/2017);    Hospital Course by Diagnosis:     #) Myxomatous mitral regurgitation  #) s/p mitral clip  - uneventful procedure and was d/c after a day  -brief episode of afib but resolved now  -admit to observation and watching vitals and troponin trend     #) Atrial fibrillation  Back to sinus when she came to ER  Continue with metoprolol 12.5 mg PO BID and coumadin     Condition on discharge  Temp:  [98.1  F (36.7  C)-98.4  F (36.9  C)] 98.3  F (36.8  C)  Heart Rate:  [61-82] 61  Resp:  [11-22] 18  BP: (105-130)/(57-73) 114/71  SpO2:  [96 %-99 %] 97 %  General: Alert, interactive, NAD  Eyes: sclera anicteric, EOMI  Cardiovascular: regular rate and rhythm, normal S1 and S2, no murmurs, gallops, or rubs  Resp: clear to auscultation bilaterally, no rales, wheezes, or rhonchi  GI: Soft, nontender, nondistended. +BS.  No HSM or masses, no rebound or  guarding.  Extremities: No edema, no cyanosis or clubbing, dorsalis pedis and posterior tibialis pulses 2+ bilaterally  Skin: Warm and dry, no jaundice or rash  Neuro: CN 2-12 intact, moves all extremities equally  Psych: Alert & oriented x 3      Medication Changes:  Continue PTA medications    Discharge medications:   Discharge Medication List as of 5/10/2017 11:34 AM      CONTINUE these medications which have NOT CHANGED    Details   ASPIRIN PO Take 81 mg by mouth daily, Historical      warfarin (COUMADIN) 5 MG tablet Take 1 tablet (5 mg) by mouth daily, Disp-90 tablet, R-3, E-Prescribe      metoprolol (LOPRESSOR) 25 MG tablet Take 0.5 tablets (12.5 mg) by mouth 2 times daily, Disp-90 tablet, R-3, E-Prescribe      zinc sulfate (ZINCATE) 220 MG capsule Take 220 mg by mouth 2 times daily, Historical      dorzolamide-timolol (COSOPT) 2-0.5 % ophthalmic solution Place 1 drop Into the left eye 2 times daily , Historical      travoprost Z, benzalkonium, (TRAVATAN Z) 0.004 % ophthalmic solution Place 1 drop into both eyes every evening , Historical             Labs or imaging requiring follow-up after discharge:  BMP and CBC in one week      Follow-up:  PCP in one week, Valve clinic in one month, Cardiac rehab, as arranged.     Code status:  Full    ELMO Ariza, CNP  [facility]  Interventional Cardiology-CSI Service  Pager [phone]       Patient Care Team:  Romelia Parker MD as PCP - General (Internal Medicine)  Debora Whalen APRN CNS as Nurse Coordinator (Clinical Nurse Specialist)  Asim Altamirano MD as MD (Cardiology)  Amee Wallace as Nurse Coordinator (Cardiology)

## 2017-05-11 NOTE — MR AVS SNAPSHOT
Frannie JC Janina   5/11/2017   Anticoagulation Therapy Visit    Description:  81 year old female   Provider:  Radha Gould, RN   Department:  [facility]           INR as of 5/11/2017     Today's INR       Anticoagulation Summary as of 5/11/2017     INR goal    Prior goal 2.0-3.0   Today's INR    Full instructions 7.5 mg every day   Next INR check 5/15/2017    Indications   Long-term (current) use of anticoagulants [Z79.01] [Z79.01]  Atrial fibrillation (H) [I48.91] [I48.91]         May 2017 Details    Sun Mon Tue Wed Thu Fri Sat      1               2               3               4               5               6                 7               8               9               10               11      7.5 mg   See details      12      7.5 mg         13      7.5 mg           14      7.5 mg         15            16               17               18               19               20                 21               22               23               24               25               26               27                 28               29               30               31                   Date Details   05/11 This INR check       Date of next INR:  5/15/2017         How to take your warfarin dose     To take:  7.5 mg Take 1.5 of the 5 mg tablets.

## 2017-05-15 ENCOUNTER — ANTICOAGULATION THERAPY VISIT (OUTPATIENT)
Dept: ANTICOAGULATION | Facility: CLINIC | Age: 82
End: 2017-05-15

## 2017-05-15 DIAGNOSIS — I48.91 ATRIAL FIBRILLATION, UNSPECIFIED TYPE (H): ICD-10-CM

## 2017-05-15 DIAGNOSIS — I48.20 CHRONIC ATRIAL FIBRILLATION (H): Primary | ICD-10-CM

## 2017-05-15 DIAGNOSIS — Z79.01 LONG-TERM (CURRENT) USE OF ANTICOAGULANTS: ICD-10-CM

## 2017-05-15 DIAGNOSIS — I48.0 PAROXYSMAL ATRIAL FIBRILLATION (H): ICD-10-CM

## 2017-05-15 LAB — INR PPP: 1.3 (ref 0.86–1.14)

## 2017-05-15 PROCEDURE — 36416 COLLJ CAPILLARY BLOOD SPEC: CPT | Performed by: FAMILY MEDICINE

## 2017-05-15 PROCEDURE — 85610 PROTHROMBIN TIME: CPT | Performed by: FAMILY MEDICINE

## 2017-05-15 RX ORDER — WARFARIN SODIUM 5 MG/1
TABLET ORAL
Qty: 60 TABLET | Refills: 3 | Status: SHIPPED | OUTPATIENT
Start: 2017-05-15 | End: 2017-06-08 | Stop reason: ALTCHOICE

## 2017-05-15 NOTE — MR AVS SNAPSHOT
Frannie JC Janina   5/15/2017   Anticoagulation Therapy Visit    Description:  81 year old female   Provider:  Lizz Melissa, RN   Department:  [facility]           INR as of 5/15/2017     Today's INR 1.30!      Anticoagulation Summary as of 5/15/2017     INR goal    Prior goal 2.0-3.0   Today's INR 1.30!   Full instructions 5/15: 10 mg; 5/17: 10 mg; Otherwise 7.5 mg every day   Next INR check 5/18/2017    Indications   Long-term (current) use of anticoagulants [Z79.01] [Z79.01]  Atrial fibrillation (H) [I48.91] [I48.91]         May 2017 Details    Sun Mon Tue Wed Thu Fri Sat      1               2               3               4               5               6                 7               8               9               10               11               12               13                 14               15      10 mg   See details      16      7.5 mg         17      10 mg         18            19               20                 21               22               23               24               25               26               27                 28               29               30               31                   Date Details   05/15 This INR check       Date of next INR:  5/18/2017         How to take your warfarin dose     To take:  7.5 mg Take 1.5 of the 5 mg tablets.    To take:  10 mg Take 2 of the 5 mg tablets.

## 2017-05-15 NOTE — PROGRESS NOTES
ANTICOAGULATION FOLLOW-UP CLINIC VISIT    Patient Name:  Frannie Roa  Date:  5/15/2017  Contact Type:  Telephone    SUBJECTIVE:     Patient Findings     Positives Med error    Comments Pt reports taking a 5 mg dose instead of 7.5 mg one day last week           OBJECTIVE    INR   Date Value Ref Range Status   05/15/2017 1.30 (H) 0.86 - 1.14 Final     Comment:     This test is intended for monitoring Coumadin therapy.  Results are not   accurate   in patients with prolonged INR due to factor deficiency.         ASSESSMENT / PLAN  INR assessment SUB    Recheck INR In: 3 DAYS    INR Location Clinic      Anticoagulation Summary as of 5/15/2017     INR goal    Prior goal 2.0-3.0   Today's INR 1.30!   Maintenance plan 7.5 mg (5 mg x 1.5) every day   Full instructions 5/15: 10 mg; 5/17: 10 mg; Otherwise 7.5 mg every day   Weekly total 52.5 mg   Plan last modified Radha Gould RN (4/12/2017)   Next INR check 5/18/2017   Priority INR   Target end date     Indications   Long-term (current) use of anticoagulants [Z79.01] [Z79.01]  Atrial fibrillation (H) [I48.91] [I48.91]         Anticoagulation Episode Summary     INR check location     Preferred lab     Send INR reminders to [facility]    Comments As of 3/20/17: +++++ INR goal range +++2.2-2.7++  3/21/17 Patient will have a finger stick INR done at the [facility] then [facility] will manage /rcj HIPPA OK to speak with Parminder Alonzo       Anticoagulation Care Providers     Provider Role Specialty Phone number    Asim Altamirano MD Responsible Cardiology [phone]            See the Encounter Report to view Anticoagulation Flowsheet and Dosing Calendar (Go to Encounters tab in chart review, and find the Anticoagulation Therapy Visit)    Spoke with Frannie.  Additional 5 mg tablets ordered today     Lizz Melissa, MILES

## 2017-05-16 ENCOUNTER — OFFICE VISIT (OUTPATIENT)
Dept: INTERNAL MEDICINE | Facility: CLINIC | Age: 82
End: 2017-05-16
Payer: MEDICARE

## 2017-05-16 VITALS
DIASTOLIC BLOOD PRESSURE: 60 MMHG | OXYGEN SATURATION: 98 % | WEIGHT: 121 LBS | SYSTOLIC BLOOD PRESSURE: 102 MMHG | HEIGHT: 63 IN | BODY MASS INDEX: 21.44 KG/M2 | HEART RATE: 62 BPM | TEMPERATURE: 98 F

## 2017-05-16 DIAGNOSIS — I50.22 CHRONIC SYSTOLIC CONGESTIVE HEART FAILURE (H): ICD-10-CM

## 2017-05-16 DIAGNOSIS — I34.0 MYXOMATOUS MITRAL VALVE REGURGITATION: Primary | ICD-10-CM

## 2017-05-16 DIAGNOSIS — I48.0 PAROXYSMAL ATRIAL FIBRILLATION (H): ICD-10-CM

## 2017-05-16 LAB — FIO2-PRE: 21 %

## 2017-05-16 PROCEDURE — 99496 TRANSJ CARE MGMT HIGH F2F 7D: CPT | Performed by: INTERNAL MEDICINE

## 2017-05-16 NOTE — NURSING NOTE
"Chief Complaint   Patient presents with     Hospital F/U       Initial /60 (BP Location: Left arm, Patient Position: Chair, Cuff Size: Adult Large)  Pulse 62  Temp 98  F (36.7  C) (Oral)  Ht 5' 3\" (1.6 m)  Wt 121 lb (54.9 kg)  SpO2 98%  BMI 21.43 kg/m2 Estimated body mass index is 21.43 kg/(m^2) as calculated from the following:    Height as of this encounter: 5' 3\" (1.6 m).    Weight as of this encounter: 121 lb (54.9 kg).  Medication Reconciliation: complete    "

## 2017-05-16 NOTE — MR AVS SNAPSHOT
After Visit Summary   5/16/2017    Frannie Roa    MRN: [id number]           Patient Information     Date Of Birth          1935        Visit Information        Provider Department      5/16/2017 10:00 AM Romelia Parker MD [facility]        Today's Diagnoses     Myxomatous mitral valve regurgitation    -  1    Chronic systolic congestive heart failure (H)        Paroxysmal atrial fibrillation (H)           Follow-ups after your visit        Your next 10 appointments already scheduled     May 18, 2017  2:00 PM CDT   Cardiac Evaluation with  Cardiac Rehab [address] [place] [address] ([facility])    [address] [facility], [address]  [facility] [id number] [phone]            May 18, 2017  3:30 PM CDT   LAB with CR LAB   [facility] ([facility])    [address] [place] [address] [phone]           Patient must bring picture ID.  Patient should be prepared to give a urine specimen  Please do not eat 10-12 hours before your appointment if you are coming in fasting for labs on lipids, cholesterol, or glucose (sugar).  Pregnant women should follow their Care Team instructions. Water with medications is okay. Do not drink coffee or other fluids.   If you have concerns about taking  your medications, please ask at office or if scheduling via Cuffed and Wantedt, send a message by clicking on Secure Messaging, Message Your Care Team.            Jun 08, 2017  9:00 AM CDT   Lab with [facility] ([facility])    [address] [place] [id number] [phone]            Jun 08, 2017  9:30 AM CDT   Ech Complete with [address] [facility] ([facility])    [address] [place] [id number] [phone]           1.  Please bring or wear a comfortable two-piece outfit. 2.  You may eat, drink and  "take your normal medicines. 3.  For any questions that cannot be answered, please contact the ordering physician            2017 10:30 AM CDT   (Arrive by 10:15 AM)   Return Mitral Valve with Shailesh Lay MD   [facility] ([facility])    [address] [address] [phone]              Who to contact     If you have questions or need follow up information about today's clinic visit or your schedule please contact [facility] directly at [phone].  Normal or non-critical lab and imaging results will be communicated to you by Addyhart, letter or phone within 4 business days after the clinic has received the results. If you do not hear from us within 7 days, please contact the clinic through Addyhart or phone. If you have a critical or abnormal lab result, we will notify you by phone as soon as possible.  Submit refill requests through PowerPlan or call your pharmacy and they will forward the refill request to us. Please allow 3 business days for your refill to be completed.          Additional Information About Your Visit        MyChart Information     PowerPlan lets you send messages to your doctor, view your test results, renew your prescriptions, schedule appointments and more. To sign up, go to [email or web address] . Click on \"Log in\" on the left side of the screen, which will take you to the Welcome page. Then click on \"Sign up Now\" on the right side of the page.     You will be asked to enter the access code listed below, as well as some personal information. Please follow the directions to create your username and password.     Your access code is: [email or web address]  Expires: 2017  7:53 PM     Your access code will  in 90 days. If you need help or a new code, please call your [facility] or [phone].        Care EveryWhere ID     This is your Care EveryWhere ID. This could be used by other " "organizations to access your [place] medical records  [id number]        Your Vitals Were     Pulse Temperature Height Pulse Oximetry Breastfeeding? BMI (Body Mass Index)    62 98  F (36.7  C) (Oral) 5' 3\" (1.6 m) 98% No 21.43 kg/m2       Blood Pressure from Last 3 Encounters:   05/16/17 102/60   05/10/17 114/71   05/09/17 99/51    Weight from Last 3 Encounters:   05/16/17 121 lb (54.9 kg)   05/09/17 121 lb (54.9 kg)   05/08/17 123 lb 7.3 oz (56 kg)              Today, you had the following     No orders found for display         Today's Medication Changes          These changes are accurate as of: 5/16/17 11:32 AM.  If you have any questions, ask your nurse or doctor.               These medicines have changed or have updated prescriptions.        Dose/Directions    warfarin 5 MG tablet   Commonly known as:  COUMADIN   This may have changed:  Another medication with the same name was removed. Continue taking this medication, and follow the directions you see here.   Used for:  Chronic atrial fibrillation (H)   Changed by:  Lizz Melissa, RN        Take one and a half tablets (7.5mg) daily, or as directed by [facility]   Quantity:  60 tablet   Refills:  3                Primary Care Provider Office Phone # Fax #    Romelia Parker [phone]       [facility] [address]   [facility] [address]        Thank you!     Thank you for choosing [facility]  for your care. Our goal is always to provide you with excellent care. Hearing back from our patients is one way we can continue to improve our services. Please take a few minutes to complete the written survey that you may receive in the mail after your visit with us. Thank you!             Your Updated Medication List - Protect others around you: Learn how to safely use, store and throw away your medicines at www.disposemymeds.org.          This list is accurate as of: 5/16/17 11:32 AM.  " Always use your most recent med list.                   Brand Name Dispense Instructions for use    ASPIRIN PO      Take 81 mg by mouth daily       dorzolamide-timolol 2-0.5 % ophthalmic solution    COSOPT     Place 1 drop Into the left eye 2 times daily       metoprolol 25 MG tablet    LOPRESSOR    90 tablet    Take 0.5 tablets (12.5 mg) by mouth 2 times daily       travoprost Z (benzalkonium) 0.004 % ophthalmic solution    TRAVATAN Z     Place 1 drop into both eyes every evening       warfarin 5 MG tablet    COUMADIN    60 tablet    Take one and a half tablets (7.5mg) daily, or as directed by [facility]       zinc sulfate 220 (50 ZN) MG capsule    ZINCATE     Take 220 mg by mouth 2 times daily

## 2017-05-16 NOTE — PROGRESS NOTES
SUBJECTIVE:                                                    Frannie Roa is a 81 year old female who presents to clinic today for the following health issues:    Hospital Follow-up Visit:    Hospital/Nursing Home/IP Rehab Facility: [facility]  Date of Admission: 5-8-2017  Date of Discharge: 5-9-2017  Reason(s) for Admission: mitral valve repair/clip            Problems taking medications regularly:  None       Medication changes since discharge: None       Problems adhering to non-medication therapy:  None    Summary of hospitalization:  [facility] discharge summary reviewed  Diagnostic Tests/Treatments reviewed.  Follow up needed: with cardiology and surgery  Other Healthcare Providers Involved in Patient s Care:         None  Update since discharge: improved. Her heart rate is running 60-80. No racing no palpitations. Energy is improving. She feels like she is ready to start lightly working in her garden. Wants to know if that's ok. Denies any problem with chest pain, pressure or dyspnea on exertion. No orthopnea or PND. No lower extremity edema. Incisions are healing nicely.     She is adjusting to being on coumadin. No issues so far. Appetite is good. No lightheadedness or dizziness.     Post Discharge Medication Reconciliation: discharge medications reconciled, continue medications without change.  Plan of care communicated with patient     Coding guidelines for this visit:  Type of Medical   Decision Making Face-to-Face Visit       within 7 Days of discharge Face-to-Face Visit        within 14 days of discharge   Moderate Complexity 72227 [id number]   High Complexity 92155 71232            Problem list and histories reviewed & adjusted, as indicated.  Additional history: as documented    Patient Active Problem List   Diagnosis     Osteoporosis     Systolic murmur     Myxomatous mitral valve regurgitation     SVT (supraventricular tachycardia) (H)     Hyperlipidemia      Advanced directives, counseling/discussion     Chronic systolic congestive heart failure (H)     Long-term (current) use of anticoagulants [Z79.01]     Atrial fibrillation (H) [I48.91]     Status post coronary angiogram     Mitral regurgitation     Past Surgical History:   Procedure Laterality Date     ANGIOGRAM  04/23/2017     BIOPSY      breast     ENT SURGERY      T & A     EYE SURGERY      Laser Trabelectomy for glaucoma     ORTHOPEDIC SURGERY      Right ankle cyst removal requiring ORIF     PERCUTANEOUS MITRAL VALVE REPAIR N/A 5/8/2017    Procedure: PERCUTANEOUS MITRAL VALVE REPAIR ANESTHESIA;  Percutaneous Mitral Valve Repair (Mitraclip);  Surgeon: Shailesh Lay MD;  Location: [facility] OR     PHACOEMULSIFICATION CLEAR CORNEA W/ STANDARD IOL IMPLANT, ENDOSCOPIC CYCLOPHOTOCOAGULATION, COMBINED  11/28/2011    Procedure:COMBINED PHACOEMULSIFICATION CLEAR CORNEA WITH STANDARD INTRAOCULAR LENS IMPLANT, ENDOSCOPIC CYCLOPHOTOCOAGULATION; LEFT EYE PHACOEMULSIFICATION CLEAR CORNEA WITH STANDARD INTRAOCULAR LENS IMPLANT, ENDOSCOPIC CYCLOPHOTOCOAGULATION ; Surgeon:ELOY WARE; Location:[facility]       Social History   Substance Use Topics     Smoking status: Never Smoker     Smokeless tobacco: Never Used     Alcohol use 0.0 oz/week     0 Standard drinks or equivalent per week      Comment: 3 glasses wine/week     Family History   Problem Relation Age of Onset     Colon Cancer Mother      Myocardial Infarction Maternal Grandmother      Myocardial Infarction Paternal Grandfather      Other Cancer Sister      Leukemia Daughter            Reviewed and updated as needed this visit by clinical staff  Tobacco  Allergies  Meds  Med Hx  Surg Hx  Fam Hx  Soc Hx      Reviewed and updated as needed this visit by Provider         ROS:  Constitutional, HEENT, cardiovascular, pulmonary, GI, , musculoskeletal, neuro, skin, endocrine and psych systems are negative, except as otherwise noted.    OBJECTIVE:                        "                             /60 (BP Location: Left arm, Patient Position: Chair, Cuff Size: Adult Large)  Pulse 62  Temp 98  F (36.7  C) (Oral)  Ht 5' 3\" (1.6 m)  Wt 121 lb (54.9 kg)  SpO2 98%  Breastfeeding? No  BMI 21.43 kg/m2  Body mass index is 21.43 kg/(m^2).  GENERAL: healthy, alert and no distress  NECK: no adenopathy, no asymmetry, masses, or scars and thyroid normal to palpation  RESP: lungs clear to auscultation - no rales, rhonchi or wheezes  CV: regular rate and rhythm, normal S1 S2, no S3 or S4, Soft murmer  lower sternal border  ABDOMEN: Normal quality bowel sounds. Soft, no palpable tenderness groin wounds healing nicely without significant bruising or drainage  MS: no gross musculoskeletal defects noted, no edema  NEURO: Normal strength and tone, mentation intact and speech normal       ASSESSMENT/PLAN:                                                        1. Myxomatous mitral valve regurgitation  With repair. Doing well, post op atrial fibrillation resolved. She can advance activity gradually as tolerated.     2. Chronic systolic congestive heart failure (H)  under good control     3. Paroxysmal atrial fibrillation (H)  On metoprolol,       Follow up in 3 months     Romelia Parker MD  [facility]  "

## 2017-05-17 NOTE — DISCHARGE SUMMARY
.      [facility]  [address] [facility] [address]  p: [phone]    Discharge Summary: Cardiology Service    Frannie Estrada MRN# [id number]   YOB: 1935 Age: 81 year old       Admission Date: 2017  Discharge Date: 05/10/17      Discharge Diagnoses:  1. Myxomatous mitral regurgitation s/p mitral clip  2. Atrial fibrillation       Pertinent Procedures:  1. Mitraclip procedure  2. Echocardiogram    Consults:  PT, OT, nutrition    Imaging with results:  Echocardiogram  Recent Results (from the past 4320 hour(s))   Echocardiogram Limited    Narrative    [id number]  [id number]  [id number]  [id number]^JUDITH^TUNDE^           [facility]  Echocardiography Laboratory  [address] [place] [address]     Name: FRANNIE ESTRADA  MRN: [id number]  : 1935  Study Date: 2017 11:52 AM  Age: 81 yrs  Gender: Female  Patient Location: [facility]  Reason For Study: S/P MV clip  History: S/P MV clip  Ordering Physician: TUNDE WONG  Referring Physician: MILDRED ANTONIO  Performed By: Jazzmine Cao RDCS     BSA: 1.6 m2  Height: 63 in  Weight: 123 lb  BP: 99/51 mmHg  _____________________________________________________________________________  __        Procedure  Limited Portable Echo Adult.  _____________________________________________________________________________  __        Interpretation Summary  Global and regional left ventricular function is normal with an EF of 55-60%.  S/p edge to edge mitral valve repair with a Mitraclip device. The device is  well anchored and stable. There is mild residual mitral regurgitation. The  mean diastolic gradient is 6 mmHg at a herat rate of 69 bpm.  Right ventricular systolic pressure is 32 mmHg above the right atrial  pressure.  The inferior vena cava is normal. Estimated mean right atrial pressure is <3  mmHg.  No pericardial effusion is  present.  _____________________________________________________________________________  __        Left Ventricle  Left ventricular size is normal. Left ventricular wall thickness is normal.  Global and regional left ventricular function is normal with an EF of 55-60%.  The LV diastolic function cannot be evaluated. No regional wall motion  abnormalities are seen.     Right Ventricle  The right ventricle is normal size. Global right ventricular function is  normal.     Atria  Severe biatrial enlargement is present.     Mitral Valve  Severe mitral annular calcification is present. S/p edge to edge mitral valve  repair with a Mitraclip device. The device is well anchored and stable. There  is mild residual mitral regurgitation. The mean diastolic gradient is 6 mmHg  at a herat rate of 69 bpm.        Aortic Valve  Mild aortic valve sclerosis is present.     Tricuspid Valve  Mild tricuspid insufficiency is present. Right ventricular systolic pressure  is 32mmHg above the right atrial pressure.     Pulmonic Valve  The pulmonic valve is normal.     Vessels  The aorta root is normal. The inferior vena cava is normal. Estimated mean  right atrial pressure is <3 mmHg.     Pericardium  No pericardial effusion is present.        Compared to Previous Study  Compared with prior, a mitraclip device is in place.  _____________________________________________________________________________  __  MMode/2D Measurements & Calculations     IVSd: 0.64 cm  LVIDd: 4.7 cm  LVIDs: 3.0 cm  LVPWd: 0.73 cm  FS: 36.6 %  EDV(Teich): 103.0 ml  ESV(Teich): 34.7 ml  LV mass(C)d: 101.3 grams  LV mass(C)dI: 64.4 grams/m2  MV Diam: 3.7 cm  LA Volume (BP): 84.0 ml  LA Volume Index (BP): 53.5 ml/m2        Doppler Measurements & Calculations  MV E max justyn: 167.9 cm/sec  MV A max justyn: 154.7 cm/sec  MV E/A: 1.1     MV max P.5 mmHg  MV mean P.6 mmHg  MV V2 VTI: 65.8 cm  MV Flow area(1diam): 10.9 cm2  MV P1/2t max justyn: 186.7 cm/sec  MV P1/2t: 61.4  msec  MVA(P1/2t): 3.6 cm2  MV dec slope: 891.0 cm/sec2  MV dec time: 0.30 sec  Ao V2 max: 219.0 cm/sec  Ao max P.0 mmHg  Ao V2 mean: 166.2 cm/sec  Ao mean P.1 mmHg  Ao V2 VTI: 46.2 cm  SV(MV 1 diam): 720.5 ml  SI(MV 1 diam): 458.1 ml/m2  TR max justyn: 281.6 cm/sec  TR max P.7 mmHg           _____________________________________________________________________________  __           Report approved by: Grisel Mcelroy 2017 01:11 PM      Echocardiogram Complete    Narrative     Other imaging studies:  Mitraclip procedure:  SUMMARY:  1. Access was obtained in the right and left common femoral veins (left for possible central access if needed).  The venous site used for MitraClip delivery was pre-closed with two Perclose Proglide percutaneous suture devices.   2. Access to the left atrium was achieved using a standard transseptal puncture technique with fluoroscopic and transesophageal echocardiographic guidance using a [facility]  RF transseptal guiding sheath and needle. A pigtail wire was advanced into the left atrium and secured in place. Following transseptal puncture intravenous heparin was administered to achieve and maintain an activated clotting time (ACT) of >300.   3. Pre-dilation of the femoral access site was performed with ascending size dilators to allow for insertion of a 24 [place] steerable transvenous sheath. The delivery sheath was advanced across the interatrium septum without difficulty. The left atrial pressure was measured as 12/30/12 mmHg (a,v,mean). A pigtail catheter was positioned in the left atrium for continuous LA pressure monitoring.  4. The MitraClip Clip Delivery System was advanced into the left atrium. Under multiplane LAKEISHA guidance, the MitraClip was oriented appropriately over the mitral valve. The clip was then opened and the arms were positioned perpendicularly to the leaflets using the en face 3D LA[place]ISHA projection. Once properly oriented, the clip was  advanced to the left ventricle, and the clip delivery system was then pulled back and the leaflets were grasped by dropping the grippers. After confirmation of adequate grasping of the leaflets, the arms were closed and reduction of mitral regurgitation was assessed and the possibility of iatrogenic mitral stenosis was evaluated. Once an adequate reduction in mitral regurgitation was confirmed with evaluation of gradients and direct echocardiographic visualization, the clip was successfully deployed. The clip was placed along the lateral aspect of A2/P2, but moderate MR remained so the clip was moved to the medial aspect of A2/P2 with resultant mild MR.    5. The delivery system and sheath were removed and optimal hemostasis was achieved with deployment of the Perclose sutures.   6. Mitral regurgitation was confirmed to be mild following the procedure and the mean gradient was 3 mmHg.     Brief HPI:  80 y/o M with PMH of myxomatous mitral regurgitation, atrial fibrillation on chronic anticoagulation, and HLD who was discharged yesterday after elective mitral clip yesterday (5/9/2017);    Hospital Course by Diagnosis:     #) Myxomatous mitral regurgitation  #) s/p mitral clip  - uneventful procedure and was d/c after a day  -brief episode of afib but resolved now  -admit to observation and watching vitals and troponin trend     #) Atrial fibrillation  Back to sinus when she came to ER  Continue with metoprolol 12.5 mg PO BID and coumadin     Condition on discharge  Temp:  [98.1  F (36.7  C)-98.4  F (36.9  C)] 98.3  F (36.8  C)  Heart Rate:  [61-82] 61  Resp:  [11-22] 18  BP: (105-130)/(57-73) 114/71  SpO2:  [96 %-99 %] 97 %  General: Alert, interactive, NAD  Eyes: sclera anicteric, EOMI  Cardiovascular: regular rate and rhythm, normal S1 and S2, no murmurs, gallops, or rubs  Resp: clear to auscultation bilaterally, no rales, wheezes, or rhonchi  GI: Soft, nontender, nondistended. +BS.  No HSM or masses, no rebound or  guarding.  Extremities: No edema, no cyanosis or clubbing, dorsalis pedis and posterior tibialis pulses 2+ bilaterally  Skin: Warm and dry, no jaundice or rash  Neuro: CN 2-12 intact, moves all extremities equally  Psych: Alert & oriented x 3      Medication Changes:  Continue PTA medications    Discharge medications:   Discharge Medication List as of 5/10/2017 11:34 AM      CONTINUE these medications which have NOT CHANGED    Details   ASPIRIN PO Take 81 mg by mouth daily, Historical      warfarin (COUMADIN) 5 MG tablet Take 1 tablet (5 mg) by mouth daily, Disp-90 tablet, R-3, E-Prescribe      metoprolol (LOPRESSOR) 25 MG tablet Take 0.5 tablets (12.5 mg) by mouth 2 times daily, Disp-90 tablet, R-3, E-Prescribe      zinc sulfate (ZINCATE) 220 MG capsule Take 220 mg by mouth 2 times daily, Historical      dorzolamide-timolol (COSOPT) 2-0.5 % ophthalmic solution Place 1 drop Into the left eye 2 times daily , Historical      travoprost Z, benzalkonium, (TRAVATAN Z) 0.004 % ophthalmic solution Place 1 drop into both eyes every evening , Historical             Labs or imaging requiring follow-up after discharge:  BMP and CBC in one week      Follow-up:  PCP in one week, Valve clinic in one month, Cardiac rehab, as arranged.     Code status:  Full    ELMO Ariza, CNP  [facility]  Interventional Cardiology-CSI Service  Pager [phone]       Patient Care Team:  Romelia Parker MD as PCP - General (Internal Medicine)  Debora Whalen APRN CNS as Nurse Coordinator (Clinical Nurse Specialist)  Asim Altamirano MD as MD (Cardiology)  Amee Wallace as Nurse Coordinator (Cardiology)

## 2017-05-18 ENCOUNTER — ANTICOAGULATION THERAPY VISIT (OUTPATIENT)
Dept: ANTICOAGULATION | Facility: CLINIC | Age: 82
End: 2017-05-18

## 2017-05-18 ENCOUNTER — HOSPITAL ENCOUNTER (OUTPATIENT)
Dept: CARDIAC REHAB | Facility: CLINIC | Age: 82
End: 2017-05-18
Attending: NURSE PRACTITIONER
Payer: MEDICARE

## 2017-05-18 VITALS — WEIGHT: 124.4 LBS | HEIGHT: 63 IN | BODY MASS INDEX: 22.04 KG/M2

## 2017-05-18 DIAGNOSIS — I48.0 PAROXYSMAL ATRIAL FIBRILLATION (H): ICD-10-CM

## 2017-05-18 DIAGNOSIS — Z79.01 LONG-TERM (CURRENT) USE OF ANTICOAGULANTS: ICD-10-CM

## 2017-05-18 LAB — INR PPP: 1.6 (ref 0.86–1.14)

## 2017-05-18 PROCEDURE — 85610 PROTHROMBIN TIME: CPT | Performed by: FAMILY MEDICINE

## 2017-05-18 PROCEDURE — 40000116 ZZH STATISTIC OP CR VISIT

## 2017-05-18 PROCEDURE — 93798 PHYS/QHP OP CAR RHAB W/ECG: CPT

## 2017-05-18 PROCEDURE — 36416 COLLJ CAPILLARY BLOOD SPEC: CPT | Performed by: FAMILY MEDICINE

## 2017-05-18 ASSESSMENT — 6 MINUTE WALK TEST (6MWT)
GENDER SELECTION: FEMALE
FEMALE CALC: 1334.73
MALE CALC: 1299.07
PREDICTED: 1306.99
TOTAL DISTANCE WALKED (FT): 1413

## 2017-05-18 NOTE — MR AVS SNAPSHOT
Frannie JC Janina   5/18/2017   Anticoagulation Therapy Visit    Description:  81 year old female   Provider:  Radha Gould, RN   Department:  [facility]           INR as of 5/18/2017     Today's INR 1.60!      Anticoagulation Summary as of 5/18/2017     INR goal    Prior goal 2.0-3.0   Today's INR 1.60!   Full instructions 5/18: 10 mg; 5/19: 10 mg; 5/20: 7.5 mg; 5/21: 7.5 mg   Next INR check 5/22/2017    Indications   Long-term (current) use of anticoagulants [Z79.01] [Z79.01]  Atrial fibrillation (H) [I48.91] [I48.91]         May 2017 Details    Sun Mon Tue Wed Thu Fri Sat      1               2               3               4               5               6                 7               8               9               10               11               12               13                 14               15               16               17               18      10 mg   See details      19      10 mg         20      7.5 mg           21      7.5 mg         22            23               24               25               26               27                 28               29               30               31                   Date Details   05/18 This INR check       Date of next INR:  5/22/2017         How to take your warfarin dose     To take:  7.5 mg Take 1.5 of the 5 mg tablets.    To take:  10 mg Take 2 of the 5 mg tablets.

## 2017-05-18 NOTE — PROGRESS NOTES
ANTICOAGULATION FOLLOW-UP CLINIC VISIT    Patient Name:  Frannie Roa  Date:  5/18/2017  Contact Type:  Telephone    SUBJECTIVE:        OBJECTIVE    INR   Date Value Ref Range Status   05/18/2017 1.60 (H) 0.86 - 1.14 Final     Comment:     This test is intended for monitoring Coumadin therapy.  Results are not   accurate   in patients with prolonged INR due to factor deficiency.         ASSESSMENT / PLAN  INR assessment SUB    Recheck INR In: 4 DAYS    INR Location Clinic      Anticoagulation Summary as of 5/18/2017     INR goal    Prior goal 2.0-3.0   Today's INR 1.60!   Maintenance plan No maintenance plan   Full instructions 5/18: 10 mg; 5/19: 10 mg; 5/20: 7.5 mg; 5/21: 7.5 mg   Plan last modified Radha Gould RN (5/18/2017)   Next INR check 5/22/2017   Priority INR   Target end date     Indications   Long-term (current) use of anticoagulants [Z79.01] [Z79.01]  Atrial fibrillation (H) [I48.91] [I48.91]         Anticoagulation Episode Summary     INR check location     Preferred lab     Send INR reminders to [facility]    Comments As of 3/20/17: +++++ INR goal range +++2.2-2.7++   HIPAA OK to speak with Parminder Alonzo       Anticoagulation Care Providers     Provider Role Specialty Phone number    Asim Alatmirano MD [facility] Cardiology [phone]            See the Encounter Report to view Anticoagulation Flowsheet and Dosing Calendar (Go to Encounters tab in chart review, and find the Anticoagulation Therapy Visit)    Spoke with Frannie Alonzo's .    Radha Gould, MILES

## 2017-05-18 NOTE — PROGRESS NOTES
05/18/17 1300   Session  Frannie Roa  81 year old  Mitral Clip   Session Initial Evaluation and Exercise Prescription   Certified through this date 06/16/17   Cardiac Rehab Assessment    I have established, reviewed and made necessary changes to the individualized treatment plan and exercise prescription for this patient.    Physician Name (printed): ________________________   Date: _______  Time: ______    Physician Signature: ___________________________________________       Cardiac Rehab Assessment 5/18/17 Pt. presents to intial evaluation following a mitral valve clip on 5/8/17. Pt. has been followed for a few years after physicians heard a heart murmur and noticed the mitral valve was slightly leaky. Procedure was successful and pt. was discharged on 5/9/17. Later that day while at home, pt. noticed her heart rate felt very fast. Pt. went back into the ER, but was found to be in SR. Pt. has a history of afib starting back in March 2017 and was placed on coumadin. Pt. is currently wearing an event monitor for 2 weeks. Pt. has not felt any palpatations or increased rates. Pt. is a very active individual who was participating at [employer] using the stair climber, biking outside 20-25 miles, and going to 30 minute walks before the procedure. Pt. is feeling fine since discharge from the ER and is looking forward to resuming her previous activity level.    The patient's history and clinical status including hemodynamics and ECG were evaluated.  The patient was assessed to be stable and appropriate to begin exercise.   The patient's functional capacity and exercise prescription were determined by the completion of the 6 minute walk test.  See results below.  The patient was oriented to the program.  Risk factor profile was completed. Goals and objectives were discussed with patient participation and approval. CV response was WNL. No symptoms, complaints or pain were reported. Good prognosis for  reaching above goals. Skilled therapy is necessary in order to monitor CV response to exercise, to provide education on risk factors and behavior change counseling needed to achieve patient's goals.  Plan to progress to 30-40 minutes of exercise prior to discharge from cardiac rehab.  Initial THR of 20-30 beats above RHR; Effort rating of 4-6.  Initiate muscle conditioning as appropriate.  Provide risk factor education and behavior change counseling.      General Information   Treatment Diagnosis Valve Repair  (Mitral Clip)   Date of Treatment Diagnosis 05/08/17   Significant Past CV History None   Comorbidities None   Lead up symptoms Heart [facility] followed for 3 years   Hospital Location [facility] Discharge Date 05/09/17   Signs and Symptoms Post Hospital Discharge Fatigue;Lightheadedness   Outpatient Cardiac Rehab Start Date 05/18/17   Primary Physician Dr. Parker   Primary Physician Follow Up Completed   Surgeon Dr. Phan   Surgeon Follow Up Scheduled   Cardiologist Dr. Altamirano   Cardiologist Follow Up Advised to schedule appointment   Ejection Fraction 60-65%   Risk Stratification Low   Summary of Cath Report   Summary of Cath Report No Significant CAD   Living and Work Status    Living Arrangements and Social Status house   Support System Live with an adult   Return to Employment Retired   Occupation , professional    Preventative Medications   CMS recommended medications Beta Blocker;Antiplatelets;Anticoagulants;Influenza vaccination;Pneumonia vaccination   Falls Screen   Have you fallen two or more times in the past year? No   Have you fallen and had an injury in the past year? No   Referral Initiated to Physical Therapy No   Pain   Patient Currently in Pain Denies   Physical Assessments   Incisions WNL   Edema None   Right Lung Sounds normal   Left Lung Sounds normal   Limitations No limitations   Individualized Treatment Plan   Monitored Sessions Scheduled 24  "  Monitored Sessions Attended 1   Nutrition Management - Weight Management   Assessment Initial Assessment   Age 81   Weight 56.4 kg (124 lb 6.4 oz)   Height 1.6 m (5' 2.99\")   BMI (Calculated) 22.09   Initial Rate Your Plate Score. Dietary tool to assess eating patterns. Scores range from 24 to 72. The higher the score the healthier the eating pattern. 68   Nutrition Management - Lipids   Lipids Labs Available   Date 03/17/17   Total Cholesterol 233   Triglycerides 89   HDL 68      Prescribed Lipid Medication No   Nutrition Management - Diabetes   Diabetes No   Nutrition Management Summary   Dietary Recommendations Anticoagulation Therapy   Stages of Change for Diet Compliance Action   Interventions Planned Attend Nutrition Education Class(es)   Nutrition Summary Comments 5/18/17 Pt. feels she eats very healthy and knows what to do.   Psychosocial Management   Psychosocial Assessment Initial   Is there history of clinical depression or increased risk of depression? No previous history   Current Level of Stress per Patient Report Mild   Current Coping Skills Has Positive Support System;Uses Stress Management/Relaxation Techniques   Initial Patient Health Questionnaire -9 Score (PHQ-9) for depression. 5-9 Minimal symptoms, 10-14 Minor depression, 15-19 Major depression, moderately severe, > 20 Major depression, severe  2   Initial [facility] Survey score.  Quality of Life:   If total score > 25 review individual areas where patient rated a 4 or 5.  Consider patients current medical condition and what role that plays on the score.   Adjust treatment protocol to improve areas of concern.  Consider the following:  PHQ9 score, DASI, and re-assessment within the next 30 days to assist with developing treatments.  16   Stages of Change Maintenance   Interventions Planned Patient to verbalize understanding of negative impact of stress to personal health;Patient to verbalize understanding of behavioral assessment " results;Patient will recognize signs and symptoms of depression   Patient Goal No   Other Core Components - Hypertension   History of or Diagnosis of Hypertension No   Currently taking Anti-Hypertensives Yes;Beta blocker   Other Core Components - Tobacco   History of Tobacco Use Never   Other Core Components Summary   Interventions Planned Educate on importance of maintaining low sodium diet   Patient Goals Yes   Goal #1 Description Pt. will increase strength and endurance to be able to garden by attending cardiac rehab 3 days per week.   Goal #1 Target Date 07/18/17   Activity/Exercise History   Activity/Exercise Assessment Initial   Activity/Exercise Status prior to event? Participated in an Exercise Program;Was Physically Active   Number of Days Currently participating in Moderate Physical Activity? 2-3   Number of Days Currently performing  Aerobic Exercise (including rehab)? 0   Number of Minutes per Session Currently of Aerobic Exercise (average)? 0   Current Stage of Change (Physical Activity) Preparation   Current Stage of Change (Aerobic Exercise) Preparation   Patient Goals Goal #1;Goal #2   Goal #1 Description Pt. will be able to walk 30 minutes 3-4 days per week with a stable CV response   Goal #1 Target Date 07/18/17   Goal #2 Description Pt. will be able to bike 20 miles.   Goal #2 Target Date 07/18/17   Activity/Exercise Target Outcome An Accumulation of 150  Minutes of Aerobic Activity per Week   Exercise Assessment   6 Minute Walk Predicted - Gender Selection Female   6 Minute Walk Predicted (Female) 1334.73   Initial 6 Minute Walk Distance (Feet) 1413 ft   Resting HR 57 bpm   Exercise HR 89 bpm   Post Exercise HR 59 bpm   Resting /62   Exercise /60   Post Exercise /62   Effort Rating 5   Current MET Level 3   MET Level Goal 4.5-5   ECG Rhythm Sinus bradycardia   Ectopy None   Current Symptoms Denies symptoms   Limitations/Restrictions None   Exercise Prescription   Mode Recumbant  bike;Ambulation;Weights   Duration/Time 15-30 min;Intermittent bouts   Frequency 3 daysweek   THR (85% of age predicted max HR) 118.15   OMNI Effort Rating (0-10 Scale) 4-6/10   Progression Continuous bouts;Total exercise time of 20-30 minutes;Total exercise time of 30-45 minutes;Progress peak intensity by 1/4 MET per week;Progress peak intensity by 1/2 MET per week;Aerobic exercise to OMNI rating of 6 or below and at or below THR   Recommended Home Exercise   Type of Exercise Walking   Frequency (days per week) 1-3   Duration (minutes per session) 15-30 min   Effort Rating Recommended 4-6/10   30 Day Exercise Plan Pt. was encouraged to begin an at home walking program starting with 5-10 minutes and gradually increase in duration as tolerated.    Current Home Exercise   Type of Exercise None   Frequency (days per week) 0   Duration (minutes per session) 0   Learning Assessment   Learner Patient   Primary Language English   Preferred Learning Style Listening;Reading;Demonstration;Pictures/Video   Barriers to Learning No barriers noted   Patient Education   Education recommended Anatomy and Physiology of the Heart;Blood Pressure;Exercise Principles;Medication Overview;Nutrition;Stress Management

## 2017-05-22 ENCOUNTER — ANTICOAGULATION THERAPY VISIT (OUTPATIENT)
Dept: ANTICOAGULATION | Facility: CLINIC | Age: 82
End: 2017-05-22

## 2017-05-22 ENCOUNTER — CARE COORDINATION (OUTPATIENT)
Dept: CARDIOLOGY | Facility: CLINIC | Age: 82
End: 2017-05-22

## 2017-05-22 DIAGNOSIS — Z79.01 LONG-TERM (CURRENT) USE OF ANTICOAGULANTS: ICD-10-CM

## 2017-05-22 DIAGNOSIS — I48.0 PAROXYSMAL ATRIAL FIBRILLATION (H): ICD-10-CM

## 2017-05-22 LAB — INR PPP: 1.7 (ref 0.86–1.14)

## 2017-05-22 PROCEDURE — 85610 PROTHROMBIN TIME: CPT | Performed by: FAMILY MEDICINE

## 2017-05-22 PROCEDURE — 36416 COLLJ CAPILLARY BLOOD SPEC: CPT | Performed by: FAMILY MEDICINE

## 2017-05-22 NOTE — PROGRESS NOTES
ANTICOAGULATION FOLLOW-UP CLINIC VISIT    Patient Name:  Frannie Roa  Date:  5/22/2017  Contact Type:  Telephone    SUBJECTIVE:     Patient Findings     Positives No Problem Findings           OBJECTIVE    INR   Date Value Ref Range Status   05/22/2017 1.70 (H) 0.86 - 1.14 Final     Comment:     This test is intended for monitoring Coumadin therapy.  Results are not   accurate   in patients with prolonged INR due to factor deficiency.         ASSESSMENT / PLAN  INR assessment SUB    Recheck INR In: 4 DAYS    INR Location Clinic      Anticoagulation Summary as of 5/22/2017     INR goal    Prior goal 2.0-3.0   Today's INR 1.70!   Maintenance plan No maintenance plan   Full instructions 5/22: 10 mg; 5/23: 10 mg; 5/24: 10 mg   Plan last modified Radha Gould RN (5/18/2017)   Next INR check 5/25/2017   Priority INR   Target end date     Indications   Long-term (current) use of anticoagulants [Z79.01] [Z79.01]  Atrial fibrillation (H) [I48.91] [I48.91]         Anticoagulation Episode Summary     INR check location     Preferred lab     Send INR reminders to [facility]    Comments As of 3/20/17: +++++ INR goal range +++2.2-2.7++   HIPAA OK to speak with Parminder Alonzo       Anticoagulation Care Providers     Provider Role Specialty Phone number    Asim Altamirano MD [facility] Cardiology [phone]            See the Encounter Report to view Anticoagulation Flowsheet and Dosing Calendar (Go to Encounters tab in chart review, and find the Anticoagulation Therapy Visit)    Message is left & advised to call us if any questions or concerns, or if she took coumadin in a dose other than what was recommended.     Lizz Melissa RN

## 2017-05-22 NOTE — PROGRESS NOTES
"Returned a telephone call to Frannie:  She stated that she has been experiencing some HR's 58-59 BPM.  She was wondering if this was OK.  She states that she feels slightly fatigued, but other than that OK.  Shej was advised to encourage her fluid intake, as well as to change positions slowly to minimize these effects.  She stated that she was in cardiac rehab and that she would be wanting to put a \"HOLD\" on her [employer] membership.  She states she was going to ask Cardiac rehab if they would write a letter.    "

## 2017-05-22 NOTE — MR AVS SNAPSHOT
Frannie JC Janina   5/22/2017   Anticoagulation Therapy Visit    Description:  81 year old female   Provider:  Lizz Melissa, RN   Department:  [facility]           INR as of 5/22/2017     Today's INR 1.70!      Anticoagulation Summary as of 5/22/2017     INR goal    Prior goal 2.0-3.0   Today's INR 1.70!   Full instructions 5/22: 10 mg; 5/23: 10 mg; 5/24: 10 mg   Next INR check 5/25/2017    Indications   Long-term (current) use of anticoagulants [Z79.01] [Z79.01]  Atrial fibrillation (H) [I48.91] [I48.91]         May 2017 Details    Sun Mon Tue Wed Thu Fri Sat      1               2               3               4               5               6                 7               8               9               10               11               12               13                 14               15               16               17               18               19               20                 21               22      10 mg   See details      23      10 mg         24      10 mg         25            26               27                 28               29               30               31                   Date Details   05/22 This INR check       Date of next INR:  5/25/2017         How to take your warfarin dose     To take:  10 mg Take 2 of the 5 mg tablets.

## 2017-05-25 ENCOUNTER — ANTICOAGULATION THERAPY VISIT (OUTPATIENT)
Dept: ANTICOAGULATION | Facility: CLINIC | Age: 82
End: 2017-05-25

## 2017-05-25 DIAGNOSIS — Z79.01 LONG-TERM (CURRENT) USE OF ANTICOAGULANTS: ICD-10-CM

## 2017-05-25 DIAGNOSIS — I48.0 PAROXYSMAL ATRIAL FIBRILLATION (H): ICD-10-CM

## 2017-05-25 LAB — INR PPP: 2.3 (ref 0.86–1.14)

## 2017-05-25 PROCEDURE — 36416 COLLJ CAPILLARY BLOOD SPEC: CPT | Performed by: FAMILY MEDICINE

## 2017-05-25 PROCEDURE — 85610 PROTHROMBIN TIME: CPT | Performed by: FAMILY MEDICINE

## 2017-05-25 NOTE — MR AVS SNAPSHOT
Frannie JC Janina   5/25/2017   Anticoagulation Therapy Visit    Description:  81 year old female   Provider:  Priscilla London, RN   Department:  [facility]           INR as of 5/25/2017     Today's INR 2.30      Anticoagulation Summary as of 5/25/2017     INR goal    Prior goal 2.0-3.0   Today's INR 2.30   Full instructions 7.5 mg on Mon, Thu, Sat; 10 mg all other days   Next INR check 6/1/2017    Indications   Long-term (current) use of anticoagulants [Z79.01] [Z79.01]  Atrial fibrillation (H) [I48.91] [I48.91]         May 2017 Details    Sun Mon Tue Wed Thu Fri Sat      1               2               3               4               5               6                 7               8               9               10               11               12               13                 14               15               16               17               18               19               20                 21               22               23               24               25      7.5 mg   See details      26      10 mg         27      7.5 mg           28      10 mg         29      7.5 mg         30      10 mg         31      10 mg             Date Details   05/25 This INR check               How to take your warfarin dose     To take:  7.5 mg Take 1.5 of the 5 mg tablets.    To take:  10 mg Take 2 of the 5 mg tablets.           June 2017 Details    Sun Mon Tue Wed Thu Fri Sat         1            2               3                 4               5               6               7               8               9               10                 11               12               13               14               15               16               17                 18               19               20               21               22               23               24                 25               26               27               28               29               30                 Date Details   No  additional details    Date of next INR:  6/1/2017         How to take your warfarin dose     To take:  7.5 mg Take 1.5 of the 5 mg tablets.

## 2017-05-25 NOTE — PROGRESS NOTES
ANTICOAGULATION FOLLOW-UP CLINIC VISIT    Patient Name:  Frannie Roa  Date:  5/25/2017  Contact Type:  Telephone    SUBJECTIVE:        OBJECTIVE    INR   Date Value Ref Range Status   05/25/2017 2.30 (H) 0.86 - 1.14 Final     Comment:     This test is intended for monitoring Coumadin therapy.  Results are not   accurate   in patients with prolonged INR due to factor deficiency.         ASSESSMENT / PLAN  INR assessment THER    Recheck INR In: 1 WEEK    INR Location Clinic      Anticoagulation Summary as of 5/25/2017     INR goal    Prior goal 2.0-3.0   Today's INR 2.30   Maintenance plan 7.5 mg (5 mg x 1.5) on Mon, Thu, Sat; 10 mg (5 mg x 2) all other days   Full instructions 7.5 mg on Mon, Thu, Sat; 10 mg all other days   Weekly total 62.5 mg   Plan last modified Priscilla London RN (5/25/2017)   Next INR check 6/1/2017   Priority INR   Target end date     Indications   Long-term (current) use of anticoagulants [Z79.01] [Z79.01]  Atrial fibrillation (H) [I48.91] [I48.91]         Anticoagulation Episode Summary     INR check location     Preferred lab     Send INR reminders to [facility]    Comments As of 3/20/17: +++++ INR goal range +++2.2-2.7++   HIPAA OK to speak with Parminder Alonzo       Anticoagulation Care Providers     Provider Role Specialty Phone number    Asim Altamirano MD Responsible Cardiology [phone]            See the Encounter Report to view Anticoagulation Flowsheet and Dosing Calendar (Go to Encounters tab in chart review, and find the Anticoagulation Therapy Visit)    Left message for patient with results and dosing recommendations. Asked patient to call back to report any missed doses, falls, signs and symptoms of bleeding or clotting, any changes in health, medication, or diet. Asked patient to call back with any questions or concerns.     Priscilla London RN     Frannie calls back later in the day to report that she thinks that the recommended dosing is to much.   After a long conversation a compromise was reached.  Frannie would like to take 7.5mg daily for the next week.  If her INR drops then she is willing to incorporate some 10mg doses into her schedule.  Attempted to explain that based on INR's it appears that 7.5mg daily isn't enough to maintain her INR in a therapeutic goal range.

## 2017-05-31 ENCOUNTER — HOSPITAL ENCOUNTER (OUTPATIENT)
Dept: CARDIAC REHAB | Facility: CLINIC | Age: 82
End: 2017-05-31
Attending: NURSE PRACTITIONER
Payer: MEDICARE

## 2017-05-31 PROCEDURE — 93798 PHYS/QHP OP CAR RHAB W/ECG: CPT | Performed by: OCCUPATIONAL THERAPIST

## 2017-05-31 PROCEDURE — 40000116 ZZH STATISTIC OP CR VISIT: Performed by: OCCUPATIONAL THERAPIST

## 2017-06-01 ENCOUNTER — HOSPITAL ENCOUNTER (OUTPATIENT)
Dept: CARDIAC REHAB | Facility: CLINIC | Age: 82
End: 2017-06-01
Attending: NURSE PRACTITIONER
Payer: MEDICARE

## 2017-06-01 ENCOUNTER — ANTICOAGULATION THERAPY VISIT (OUTPATIENT)
Dept: ANTICOAGULATION | Facility: CLINIC | Age: 82
End: 2017-06-01

## 2017-06-01 DIAGNOSIS — Z79.01 LONG-TERM (CURRENT) USE OF ANTICOAGULANTS: ICD-10-CM

## 2017-06-01 DIAGNOSIS — I48.0 PAROXYSMAL ATRIAL FIBRILLATION (H): ICD-10-CM

## 2017-06-01 LAB — INR PPP: 2 (ref 0.86–1.14)

## 2017-06-01 PROCEDURE — 85610 PROTHROMBIN TIME: CPT | Performed by: FAMILY MEDICINE

## 2017-06-01 PROCEDURE — 40000116 ZZH STATISTIC OP CR VISIT

## 2017-06-01 PROCEDURE — 36416 COLLJ CAPILLARY BLOOD SPEC: CPT | Performed by: FAMILY MEDICINE

## 2017-06-01 PROCEDURE — 93798 PHYS/QHP OP CAR RHAB W/ECG: CPT

## 2017-06-01 NOTE — MR AVS SNAPSHOT
Frannie JC Janina   6/1/2017   Anticoagulation Therapy Visit    Description:  81 year old female   Provider:  Jennifer Rollins, RN   Department:  [facility]           INR as of 6/1/2017     Today's INR 2.00      Anticoagulation Summary as of 6/1/2017     INR goal    Prior goal 2.0-3.0   Today's INR 2.00   Full instructions 6/1: 10 mg; Otherwise 7.5 mg every day   Next INR check 6/8/2017    Indications   Long-term (current) use of anticoagulants [Z79.01] [Z79.01]  Atrial fibrillation (H) [I48.91] [I48.91]         June 2017 Details    Sun Mon Tue Wed Thu Fri Sat         1      10 mg   See details      2      7.5 mg         3      7.5 mg           4      7.5 mg         5      7.5 mg         6      7.5 mg         7      7.5 mg         8            9               10                 11               12               13               14               15               16               17                 18               19               20               21               22               23               24                 25               26               27               28               29               30                 Date Details   06/01 This INR check       Date of next INR:  6/8/2017         How to take your warfarin dose     To take:  7.5 mg Take 1.5 of the 5 mg tablets.    To take:  10 mg Take 2 of the 5 mg tablets.

## 2017-06-01 NOTE — PROGRESS NOTES
ANTICOAGULATION FOLLOW-UP CLINIC VISIT    Patient Name:  Frannie Roa  Date:  6/1/2017  Contact Type:  Telephone    SUBJECTIVE:     Patient Findings     Comments Per previous note pt agreed on taking 7.5mg daily will document this is the calendar to reflect what pt is taking            OBJECTIVE    INR   Date Value Ref Range Status   06/01/2017 2.00 (H) 0.86 - 1.14 Final     Comment:     This test is intended for monitoring Coumadin therapy.  Results are not   accurate   in patients with prolonged INR due to factor deficiency.         ASSESSMENT / PLAN  INR assessment SUB    Recheck INR In: 1 WEEK    INR Location Clinic      Anticoagulation Summary as of 6/1/2017     INR goal    Prior goal 2.0-3.0   Today's INR 2.00   Maintenance plan 7.5 mg (5 mg x 1.5) every day   Full instructions 6/1: 10 mg; Otherwise 7.5 mg every day   Weekly total 52.5 mg   Plan last modified Jennifer Rollins RN (6/1/2017)   Next INR check 6/8/2017   Priority INR   Target end date     Indications   Long-term (current) use of anticoagulants [Z79.01] [Z79.01]  Atrial fibrillation (H) [I48.91] [I48.91]         Anticoagulation Episode Summary     INR check location     Preferred lab     Send INR reminders to [facility]    Comments As of 3/20/17: +++++ INR goal range +++2.2-2.7++   HIPAA OK to speak with Parminder Alonzo       Anticoagulation Care Providers     Provider Role Specialty Phone number    Asim Altamirano MD Responsible Cardiology [phone]            See the Encounter Report to view Anticoagulation Flowsheet and Dosing Calendar (Go to Encounters tab in chart review, and find the Anticoagulation Therapy Visit)    Spoke with spouse Clinton. Gave them lab results and new warfarin recommendation.  No changes in health, medication, or diet. No missed doses, no falls. No signs or symptoms of bleed or clotting.     Jennifer Rollins, RN

## 2017-06-05 ENCOUNTER — HOSPITAL ENCOUNTER (OUTPATIENT)
Dept: CARDIAC REHAB | Facility: CLINIC | Age: 82
End: 2017-06-05
Attending: NURSE PRACTITIONER
Payer: MEDICARE

## 2017-06-05 PROCEDURE — 93798 PHYS/QHP OP CAR RHAB W/ECG: CPT

## 2017-06-05 PROCEDURE — 40000116 ZZH STATISTIC OP CR VISIT

## 2017-06-06 ENCOUNTER — PRE VISIT (OUTPATIENT)
Dept: CARDIOLOGY | Facility: CLINIC | Age: 82
End: 2017-06-06

## 2017-06-06 DIAGNOSIS — I34.0 MITRAL VALVE INSUFFICIENCY, UNSPECIFIED ETIOLOGY: Primary | ICD-10-CM

## 2017-06-08 ENCOUNTER — OFFICE VISIT (OUTPATIENT)
Dept: CARDIOLOGY | Facility: CLINIC | Age: 82
End: 2017-06-08
Attending: INTERNAL MEDICINE
Payer: MEDICARE

## 2017-06-08 ENCOUNTER — CARE COORDINATION (OUTPATIENT)
Dept: CARDIOLOGY | Facility: CLINIC | Age: 82
End: 2017-06-08

## 2017-06-08 ENCOUNTER — RADIANT APPOINTMENT (OUTPATIENT)
Dept: CARDIOLOGY | Facility: CLINIC | Age: 82
End: 2017-06-08

## 2017-06-08 ENCOUNTER — ANTICOAGULATION THERAPY VISIT (OUTPATIENT)
Dept: ANTICOAGULATION | Facility: CLINIC | Age: 82
End: 2017-06-08

## 2017-06-08 VITALS
BODY MASS INDEX: 21.85 KG/M2 | DIASTOLIC BLOOD PRESSURE: 71 MMHG | HEIGHT: 63 IN | WEIGHT: 123.3 LBS | OXYGEN SATURATION: 97 % | HEART RATE: 57 BPM | SYSTOLIC BLOOD PRESSURE: 108 MMHG

## 2017-06-08 DIAGNOSIS — Z79.01 LONG-TERM (CURRENT) USE OF ANTICOAGULANTS: ICD-10-CM

## 2017-06-08 DIAGNOSIS — I48.0 PAROXYSMAL ATRIAL FIBRILLATION (H): ICD-10-CM

## 2017-06-08 DIAGNOSIS — I34.0 MITRAL VALVE INSUFFICIENCY, UNSPECIFIED ETIOLOGY: Primary | ICD-10-CM

## 2017-06-08 DIAGNOSIS — I34.0 MITRAL VALVE INSUFFICIENCY, UNSPECIFIED ETIOLOGY: ICD-10-CM

## 2017-06-08 LAB
ANION GAP SERPL CALCULATED.3IONS-SCNC: 6 MMOL/L (ref 3–14)
BUN SERPL-MCNC: 16 MG/DL (ref 7–30)
CALCIUM SERPL-MCNC: 8.9 MG/DL (ref 8.5–10.1)
CHLORIDE SERPL-SCNC: 105 MMOL/L (ref 94–109)
CO2 SERPL-SCNC: 28 MMOL/L (ref 20–32)
CREAT SERPL-MCNC: 0.66 MG/DL (ref 0.52–1.04)
ERYTHROCYTE [DISTWIDTH] IN BLOOD BY AUTOMATED COUNT: 13.8 % (ref 10–15)
GFR SERPL CREATININE-BSD FRML MDRD: 85 ML/MIN/1.7M2
GLUCOSE SERPL-MCNC: 68 MG/DL (ref 70–99)
HCT VFR BLD AUTO: 40.3 % (ref 35–47)
HGB BLD-MCNC: 12.8 G/DL (ref 11.7–15.7)
INR PPP: 1.3 (ref 0.86–1.14)
MCH RBC QN AUTO: 30 PG (ref 26.5–33)
MCHC RBC AUTO-ENTMCNC: 31.8 G/DL (ref 31.5–36.5)
MCV RBC AUTO: 95 FL (ref 78–100)
PLATELET # BLD AUTO: 266 10E9/L (ref 150–450)
POTASSIUM SERPL-SCNC: 4.1 MMOL/L (ref 3.4–5.3)
RBC # BLD AUTO: 4.26 10E12/L (ref 3.8–5.2)
SODIUM SERPL-SCNC: 139 MMOL/L (ref 133–144)
WBC # BLD AUTO: 5.4 10E9/L (ref 4–11)

## 2017-06-08 PROCEDURE — 85610 PROTHROMBIN TIME: CPT | Performed by: INTERNAL MEDICINE

## 2017-06-08 PROCEDURE — 93005 ELECTROCARDIOGRAM TRACING: CPT | Mod: ZF

## 2017-06-08 PROCEDURE — 99212 OFFICE O/P EST SF 10 MIN: CPT | Mod: ZF

## 2017-06-08 PROCEDURE — 80048 BASIC METABOLIC PNL TOTAL CA: CPT | Performed by: INTERNAL MEDICINE

## 2017-06-08 PROCEDURE — 93010 ELECTROCARDIOGRAM REPORT: CPT | Mod: ZP | Performed by: INTERNAL MEDICINE

## 2017-06-08 PROCEDURE — 99214 OFFICE O/P EST MOD 30 MIN: CPT | Mod: 24 | Performed by: INTERNAL MEDICINE

## 2017-06-08 PROCEDURE — 85027 COMPLETE CBC AUTOMATED: CPT | Performed by: INTERNAL MEDICINE

## 2017-06-08 PROCEDURE — 36415 COLL VENOUS BLD VENIPUNCTURE: CPT | Performed by: INTERNAL MEDICINE

## 2017-06-08 RX ORDER — METOPROLOL SUCCINATE 25 MG/1
12.5 TABLET, EXTENDED RELEASE ORAL DAILY
Qty: 45 TABLET | Refills: 3 | Status: SHIPPED | OUTPATIENT
Start: 2017-06-08 | End: 2018-05-22

## 2017-06-08 ASSESSMENT — PAIN SCALES - GENERAL: PAINLEVEL: NO PAIN (0)

## 2017-06-08 ASSESSMENT — 6 MINUTE WALK TEST (6MWT)
GENDER SELECTION: FEMALE
TOTAL DISTANCE WALKED (FT): 1413

## 2017-06-08 NOTE — PROGRESS NOTES
Frannie Roa  81 year old  Mitral Clip Valve Repair 06/08/17 1600   Session   Session 30 Day Individualized Treatment Plan   Certified through this date 07/08/17   Cardiac Rehab Assessment  Physician cosignature/electronic signature indicates approval of this ITP document. I have established, reviewed and made necessary changes to the individualized treatment plan and exercise prescription for this patient.   Cardiac Rehab Assessment 5/18/17 Pt. presents to intial evaluation following a mitral valve clip on 5/8/17. Pt. has been followed for a few years after physicians heard a heart murmur and noticed the mitral valve was slightly leaky. Procedure was successful and pt. was discharged on 5/9/17. Later that day while at home, pt. noticed her heart rate felt very fast. Pt. went back into the ER, but was found to be in SR. Pt. has a history of afib starting back in March 2017 and was placed on coumadin. Pt. is currently wearing an event monitor for 2 weeks. Pt. has not felt any palpatations or increased rates. Pt. is a very active individual who was participating at [employer] using the stair climber, biking outside 20-25 miles, and going to 30 minute walks before the procedure. Pt. is feeling fine since discharge from the ER and is looking forward to resuming her previous activity level.6/8/17 ITP forwarded for review by medical director.   General Information   Treatment Diagnosis Valve Repair  (Mitral Clip)   Date of Treatment Diagnosis 05/08/17   Significant Past CV History None   Comorbidities None   Lead up symptoms Heart Murmer followed for 3 years   Hospital Location [facility]   Hospital Discharge Date 05/09/17   Signs and Symptoms Post Hospital Discharge Fatigue;Lightheadedness   Outpatient Cardiac Rehab Start Date 05/18/17   Primary Physician Dr. Parker   Primary Physician Follow Up Completed   Surgeon Dr. Phan   Surgeon Follow Up Scheduled   Cardiologist Dr. Altamirano   Cardiologist Follow  Up Advised to schedule appointment   Ejection Fraction 60-65%   Risk Stratification Low   Summary of Cath Report   Summary of Cath Report No Significant CAD   Living and Work Status    Living Arrangements and Social Status house   Support System Live with an adult   Return to Employment Retired   Occupation , professional    Preventative Medications   CMS recommended medications Beta Blocker;Antiplatelets;Anticoagulants;Influenza vaccination;Pneumonia vaccination   Falls Screen   Have you fallen two or more times in the past year? No   Have you fallen and had an injury in the past year? No   Referral Initiated to Physical Therapy No   Pain   Patient Currently in Pain Denies   Physical Assessments   Incisions WNL   Edema None   Right Lung Sounds normal   Left Lung Sounds normal   Limitations No limitations   Individualized Treatment Plan   Monitored Sessions Scheduled 24   Monitored Sessions Attended 1   Nutrition Management - Weight Management   Assessment Initial Assessment   Age 81   Initial Rate Your Plate Score. Dietary tool to assess eating patterns. Scores range from 24 to 72. The higher the score the healthier the eating pattern. 68   Nutrition Management - Lipids   Lipids Labs Available   Date 03/17/17   Total Cholesterol 233   Triglycerides 89   HDL 68      Prescribed Lipid Medication No   Nutrition Management - Diabetes   Diabetes No   Nutrition Management Summary   Dietary Recommendations Anticoagulation Therapy   Stages of Change for Diet Compliance Action   Interventions Planned Attend Nutrition Education Class(es)   Nutrition Summary Comments 5/18/17 Pt. feels she eats very healthy and knows what to do.   Psychosocial Management   Psychosocial Assessment Initial   Is there history of clinical depression or increased risk of depression? No previous history   Current Level of Stress per Patient Report Mild   Current Coping Skills Has Positive Support System;Uses Stress  Management/Relaxation Techniques   Initial Patient Health Questionnaire -9 Score (PHQ-9) for depression. 5-9 Minimal symptoms, 10-14 Minor depression, 15-19 Major depression, moderately severe, > 20 Major depression, severe  2   Initial [facility] Survey score.  Quality of Life:   If total score > 25 review individual areas where patient rated a 4 or 5.  Consider patients current medical condition and what role that plays on the score.   Adjust treatment protocol to improve areas of concern.  Consider the following:  PHQ9 score, DASI, and re-assessment within the next 30 days to assist with developing treatments.  16   Stages of Change Maintenance   Interventions Planned Patient to verbalize understanding of negative impact of stress to personal health;Patient to verbalize understanding of behavioral assessment results;Patient will recognize signs and symptoms of depression   Patient Goal No   Other Core Components - Hypertension   History of or Diagnosis of Hypertension No   Currently taking Anti-Hypertensives Yes;Beta blocker   Other Core Components - Tobacco   History of Tobacco Use Never   Other Core Components Summary   Interventions Planned Educate on importance of maintaining low sodium diet   Patient Goals Yes   Goal #1 Description Pt. will increase strength and endurance to be able to garden by attending cardiac rehab 3 days per week.   Goal #1 Target Date 07/18/17   Activity/Exercise History   Activity/Exercise Assessment Initial   Activity/Exercise Status prior to event? Participated in an Exercise Program;Was Physically Active   Number of Days Currently participating in Moderate Physical Activity? 2-3   Number of Days Currently performing  Aerobic Exercise (including rehab)? 0   Number of Minutes per Session Currently of Aerobic Exercise (average)? 0   Current Stage of Change (Physical Activity) Preparation   Current Stage of Change (Aerobic Exercise) Preparation   Patient Goals Goal #1;Goal #2    Goal #1 Description Pt. will be able to walk 30 minutes 3-4 days per week with a stable CV response   Goal #1 Target Date 07/18/17   Goal #2 Description Pt. will be able to bike 20 miles.   Goal #2 Target Date 07/18/17   Activity/Exercise Target Outcome An Accumulation of 150  Minutes of Aerobic Activity per Week   Exercise Assessment   6 Minute Walk Predicted - Gender Selection Female   Initial 6 Minute Walk Distance (Feet) 1413 ft   Resting HR 57 bpm   Exercise HR 89 bpm   Post Exercise HR 59 bpm   Resting /62   Exercise /60   Post Exercise /62   Effort Rating 5   Current MET Level 3   MET Level Goal 4.5-5   ECG Rhythm Sinus bradycardia   Ectopy None   Current Symptoms Denies symptoms   Limitations/Restrictions None   Exercise Prescription   Mode Recumbant bike;Ambulation;Weights   Duration/Time 15-30 min;Intermittent bouts   Frequency 3 daysweek   THR (85% of age predicted max HR) 118.15   OMNI Effort Rating (0-10 Scale) 4-6/10   Progression Continuous bouts;Total exercise time of 20-30 minutes;Total exercise time of 30-45 minutes;Progress peak intensity by 1/4 MET per week;Progress peak intensity by 1/2 MET per week;Aerobic exercise to OMNI rating of 6 or below and at or below THR   Recommended Home Exercise   Type of Exercise Walking   Frequency (days per week) 1-3   Duration (minutes per session) 15-30 min   Effort Rating Recommended 4-6/10   30 Day Exercise Plan Pt. was encouraged to begin an at home walking program starting with 5-10 minutes and gradually increase in duration as tolerated.    Current Home Exercise   Type of Exercise None   Frequency (days per week) 0   Duration (minutes per session) 0   Learning Assessment   Learner Patient   Primary Language English   Preferred Learning Style Listening;Reading;Demonstration;Pictures/Video   Barriers to Learning No barriers noted   Patient Education   Education recommended Anatomy and Physiology of the Heart;Blood Pressure;Exercise  Principles;Medication Overview;Nutrition;Stress Management

## 2017-06-08 NOTE — NURSING NOTE
Chief Complaint   Patient presents with     Follow Up For     EKG: dx; Mitral valve regurgitation:  82 yo FM, here today for f/u eval for mitral valve regurgitaiton.     Med Reconcile: Reviewed and verified all current medications with the patient. The updated medication list was printed and given to the patient.  Return Appointment: Patient given instructions regarding scheduling next clinic visit. Patient demonstrated understanding of this information and agreed to call with further questions or concerns.  Patient stated she understood all health information given and agreed to call with further questions or concerns.

## 2017-06-08 NOTE — MR AVS SNAPSHOT
Frannie JC Janina   6/8/2017   Anticoagulation Therapy Visit    Description:  81 year old female   Provider:  Radha Gould, RN   [facility]           INR as of 6/8/2017     Today's INR 1.30!      Anticoagulation Summary as of 6/8/2017     INR goal    Prior goal 2.0-3.0   Today's INR 1.30!   Full instructions 7.5 mg on Mon, Wed, Fri; 10 mg all other days   Next INR check 6/15/2017    Indications   Long-term (current) use of anticoagulants [Z79.01] [Z79.01]  Atrial fibrillation (H) [I48.91] [I48.91]         June 2017 Details    Sun Mon Tue Wed Thu Fri Sat         1               2               3                 4               5               6               7               8      10 mg   See details      9      7.5 mg         10      10 mg           11      10 mg         12      7.5 mg         13      10 mg         14      7.5 mg         15            16               17                 18               19               20               21               22               23               24                 25               26               27               28               29               30                 Date Details   06/08 This INR check       Date of next INR:  6/15/2017         How to take your warfarin dose     To take:  7.5 mg Take 1.5 of the 5 mg tablets.    To take:  10 mg Take 2 of the 5 mg tablets.

## 2017-06-08 NOTE — PATIENT INSTRUCTIONS
1.  START:  Metoprolol 12.5 mg, by mouth, daily  2.  Follow up with Dr. Lay in one year with an echo and 6 minute walk prior to the visit.  3.  START:  Eliquis (Apixaban) 2.5 mg, by mouth, two times daily    Nursing questions: [phone] option 1 then chose option 3 for the triage nurse, and then ask to speak with Julia.  For emergencies call 911.    It was a pleasure to see you in clinic.  If you have any questions at all, please feel free to give me a call.      Julia Wallace, RN  Structural Heart Care Coordinator   TAVR and MitraClip Programs  [facility]  Office: [phone]    [facility]  [address] [place] [address]  Cardiology Clinic [id number]  [place] [address]

## 2017-06-08 NOTE — PROGRESS NOTES
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 There were no immediate cat-procedural complications, and pt has been doing well at home since the procedure. She has unlimited exercise tolerance, and no signs of CHF, fluid overload, or stroke or SE. She reports feeling general fatigue for about 4 months, which is generally improving, but is associated with starting metoprolol for AF, and worse each day after taking her metoprolol. Of note, she is in SR on EKG today and reports she does not think she has had any further episodes of AF since prior to the MitraClip, since she is symptomatic during the episodes.    Echo today shows normal LVEF, well seated device in proper position, and only mild residual MR with acceptable inflow gradient (3mmHg), unchanged severe SOHAN.    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5 % ophthalmic solution Place 1 drop Into the left eye 2 times daily        travoprost Z, benzalkonium, (TRAVATAN Z) 0.004 % ophthalmic solution Place 1 drop into both eyes every evening           PAST SURGICAL HISTORY:  Past Surgical History:   Procedure Laterality Date     ANGIOGRAM  04/23/2017     BIOPSY      breast     ENT SURGERY      T & A     EYE SURGERY      Laser Trabelectomy for glaucoma     ORTHOPEDIC SURGERY      Right ankle cyst removal requiring ORIF     PERCUTANEOUS MITRAL VALVE REPAIR N/A 5/8/2017    Procedure: PERCUTANEOUS MITRAL VALVE REPAIR ANESTHESIA;  Percutaneous Mitral Valve Repair (Mitraclip);  Surgeon: Shailesh Lay MD;  Location: [facility] OR     PHACOEMULSIFICATION CLEAR CORNEA W/ STANDARD IOL IMPLANT, ENDOSCOPIC CYCLOPHOTOCOAGULATION, COMBINED  11/28/2011    Procedure:COMBINED PHACOEMULSIFICATION CLEAR CORNEA WITH STANDARD INTRAOCULAR LENS IMPLANT, ENDOSCOPIC CYCLOPHOTOCOAGULATION; LEFT EYE PHACOEMULSIFICATION CLEAR CORNEA WITH STANDARD INTRAOCULAR LENS IMPLANT, ENDOSCOPIC CYCLOPHOTOCOAGULATION ; Surgeon:ELOY WARE; Location:[facility]       ALLERGIES     Allergies   Allergen Reactions     Penicillins      Sulfa Drugs        FAMILY HISTORY:  Family History   Problem Relation Age of Onset     Colon Cancer Mother      Myocardial Infarction Maternal Grandmother      Myocardial Infarction Paternal Grandfather      Other Cancer Sister      Leukemia Daughter        SOCIAL HISTORY:  Social History     Social History     Marital status:      Spouse name: N/A     Number of children: N/A     Years of education: N/A     Social History Main Topics     Smoking status: Never Smoker     Smokeless tobacco: Never Used     Alcohol use 0.0 oz/week     0 Standard drinks or equivalent per week      Comment: 3 glasses wine/week     Drug use: No     Sexual activity: Yes     Partners: Male     Other Topics Concern     Parent/Sibling W/ Cabg, Mi Or Angioplasty Before 65f 55m? No     Social History Narrative       ROS:   Constitutional: No fever, chills, or sweats. No weight gain/loss   ENT: No visual disturbance, ear ache, epistaxis, sore throat  Allergies/Immunologic: Negative.  "  Respiratory: No cough, hemoptysia  Cardiovascular: As per HPI  GI: No nausea, vomiting, hematemesis, melena, or hematochezia  : No urinary frequency, dysuria, or hematuria  Integument: Negative  Psychiatric: Negative  Neuro: Negative  Endocrinology: Negative   Musculoskeletal: Negative    EXAM:  /71 (BP Location: Left arm, Patient Position: Chair, Cuff Size: Adult Regular)  Pulse 57  Ht 1.6 m (5' 3\")  Wt 55.9 kg (123 lb 4.8 oz)  SpO2 97%  BMI 21.84 kg/m2  Comf, NAD  MMM  EOMi  CTA B no rales  S1s2 3/6 holosystolic murmer  No cce warm 1+DP    Labs:  LIPID RESULTS:  Lab Results   Component Value Date    CHOL 233 (H) 03/17/2017    HDL 68 03/17/2017     (H) 03/17/2017    TRIG 89 03/17/2017    NHDL 165 (H) 03/17/2017       LIVER ENZYME RESULTS:  Lab Results   Component Value Date    AST 20 05/09/2017    ALT 29 05/09/2017       CBC RESULTS:  Lab Results   Component Value Date    WBC 5.4 06/08/2017    RBC 4.26 06/08/2017    HGB 12.8 06/08/2017    HCT 40.3 06/08/2017    MCV 95 06/08/2017    MCH 30.0 06/08/2017    MCHC 31.8 06/08/2017    RDW 13.8 06/08/2017     06/08/2017       BMP RESULTS:  Lab Results   Component Value Date     06/08/2017    POTASSIUM 4.1 06/08/2017    CHLORIDE 105 06/08/2017    CO2 28 06/08/2017    ANIONGAP 6 06/08/2017    GLC 68 (L) 06/08/2017    BUN 16 06/08/2017    CR 0.66 06/08/2017    GFRESTIMATED 85 06/08/2017    GFRESTBLACK >90   GFR Calc   06/08/2017    NICOLE 8.9 06/08/2017        A1C RESULTS:  No results found for: A1C    INR RESULTS:  Lab Results   Component Value Date    INR 1.30 (H) 06/08/2017    INR 2.00 (H) 06/01/2017       Procedures:  Interpretation Summary  Global and regional left ventricular function is normal with an EF of 55-60%.  S/p edge to edge mitral valve repair with a Mitraclip device. The device is  well anchored and stable. There is mild residual mitral regurgitation. The  mean diastolic gradient is 3 mmHg at a herat rate of 54 " bpm.  Right ventricular systolic pressure is 26 mmHg above the right atrial  pressure.  The inferior vena cava is normal. Estimated mean right atrial pressure is <3  mmHg.  No pericardial effusion is present.  Compared with 5/9/2017, the degree of pulmonary hypertension has continued to  improve. The gradient across the mitral valve is lower at a slower heart rate.    EKG see HPI    Assessment and Plan: 81F with degenerative MR s/p MitraClip (5/9/17), normal LV, SOHAN, AF on warfarin, HL, seen in clinic for one month follow up. Doing well clinically, echo today shows well seated device in proper position, and only mild residual MR with acceptable inflow gradient (3mmHg), EKG shows SR.    - doing very well  - given low energy symptoms associated with metoprolol, will reduce dose from metoprolol 12.5 bid to metoprolol xl 12.5 daily.  - regarding anticoagulation for PAF (CHADSVASc=3 for age and female), discussed pros and cons of warfarin vs OACs. INR subtherapeutic today and patient would prefer fewer dietary restrictions and monitoring. Will change to apixaban 2.5 BID (age>80, weight<60 kg). Begin today since INR sub-tx. D/c warfarin.  - RTC 1 year with echo the morning of the visit    Discussed with Dr Alireza Roblero  Cardiology Fellow  [phone]    Patient examined, chart reviewed and the above reflects our joint assessment and plans.      Shailesh Lay MD          CC  Patient Care Team:  Jd Robbins MD as PCP - General (Internal Medicine)  Debora Whalen APRN CNS as Nurse Coordinator (Clinical Nurse Specialist)  Asim Altamirano MD as MD (Cardiology)  Amee Wallace as Nurse Coordinator (Cardiology)  Mary Nelson EP as Cardiac Rehabilitation Therapist  Shiva Hammonds RN as Nurse Coordinator  JD ROBBINS

## 2017-06-08 NOTE — ADDENDUM NOTE
Encounter addended by: Stanford Rodarte EP on: 6/8/2017  4:45 PM<BR>     Actions taken: Flowsheet data copied forward, Sign clinical note, Flowsheet accepted

## 2017-06-08 NOTE — PROGRESS NOTES
ANTICOAGULATION FOLLOW-UP CLINIC VISIT    Patient Name:  Frannie Roa  Date:  6/8/2017  Contact Type:  Telephone    SUBJECTIVE:        OBJECTIVE    INR   Date Value Ref Range Status   06/08/2017 1.30 (H) 0.86 - 1.14 Final       ASSESSMENT / PLAN  INR assessment SUB    Recheck INR In: 1 WEEK    INR Location Clinic      Anticoagulation Summary as of 6/8/2017     INR goal    Prior goal 2.0-3.0   Today's INR 1.30!   Maintenance plan 7.5 mg (5 mg x 1.5) on Mon, Wed, Fri; 10 mg (5 mg x 2) all other days   Full instructions 7.5 mg on Mon, Wed, Fri; 10 mg all other days   Weekly total 62.5 mg   Plan last modified Radha Gould, RN (6/8/2017)   Next INR check 6/15/2017   Priority INR   Target end date     Indications   Long-term (current) use of anticoagulants [Z79.01] [Z79.01]  Atrial fibrillation (H) [I48.91] [I48.91]         Anticoagulation Episode Summary     INR check location     Preferred lab     Send INR reminders to [facility]    Comments As of 3/20/17: +++++ INR goal range +++2.2-2.7++   HIPAA OK to speak with Parminder Alonzo       Anticoagulation Care Providers     Provider Role Specialty Phone number    Asim Altamirano MD Responsible Cardiology [phone]            See the Encounter Report to view Anticoagulation Flowsheet and Dosing Calendar (Go to Encounters tab in chart review, and find the Anticoagulation Therapy Visit)    Left message with results and dosing recommendations. Asked patient to call back to report any missed doses, falls, signs and symptoms of bleeding or clotting, or any changes to health or diet.     Recommending 10 mg four days and 7.5 mg three days this coming week. This is subject to change depending on next INR.    Radha Gould, RN        ADDENDUM:  Pt phones later in the day to report she missed a dose of coumadin last week.  She also saw MD today who is recommending she start eloquis.  She would like to do this now, since the INR is < 2 - however she has  to wait to see if her insurance will cover the eloquis.  She is advised to contact the cardiology office re: status of the eloquis (as she states MD was to phone her insurance company & request this med get reimbursed) & will follow the coumadin recommendations given today unless cardiology advises something different.

## 2017-06-08 NOTE — LETTER
6/8/2017      RE: Frannie Roa  [address]  [facility] [address]       Dear Colleague,    Thank you for the opportunity to participate in the care of your patient, Frannie Roa, at the [facility] at [facility]. Please see a copy of my visit note below.    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 There were no immediate cat-procedural complications, and pt has been doing well at home since the procedure. She has unlimited exercise tolerance, and no signs of CHF, fluid overload, or stroke or SE. She reports feeling general fatigue for about 4 months, which is generally improving, but is associated with starting metoprolol for AF, and worse each day after taking her metoprolol. Of note, she is in SR on EKG today and reports she does not think she has had any further episodes of AF since prior to the MitraClip, since she is symptomatic during the episodes.    Echo today shows normal LVEF, well seated device in proper position, and only mild residual MR with acceptable inflow gradient (3mmHg), unchanged severe SOHAN.    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     SVT (supraventricular tachycardia) (H) 8/25/2014     Systolic murmur 4/22/2014       CURRENT MEDICATIONS:  Current Outpatient Prescriptions   Medication Sig Dispense Refill     warfarin (COUMADIN) 5 MG tablet Take one and a half tablets (7.5mg) daily, or as directed by [facility] 60 tablet 3     ASPIRIN PO Take 81 mg by mouth daily       metoprolol (LOPRESSOR) 25 MG tablet Take 0.5 tablets (12.5 mg) by mouth 2 times  daily 90 tablet 3     zinc sulfate (ZINCATE) 220 MG capsule Take 220 mg by mouth 2 times daily       dorzolamide-timolol (COSOPT) 2-0.5 % ophthalmic solution Place 1 drop Into the left eye 2 times daily        travoprost Z, benzalkonium, (TRAVATAN Z) 0.004 % ophthalmic solution Place 1 drop into both eyes every evening          PAST SURGICAL HISTORY:  Past Surgical History:   Procedure Laterality Date     ANGIOGRAM  04/23/2017     BIOPSY      breast     ENT SURGERY      T & A     EYE SURGERY      Laser Trabelectomy for glaucoma     ORTHOPEDIC SURGERY      Right ankle cyst removal requiring ORIF     PERCUTANEOUS MITRAL VALVE REPAIR N/A 5/8/2017    Procedure: PERCUTANEOUS MITRAL VALVE REPAIR ANESTHESIA;  Percutaneous Mitral Valve Repair (Mitraclip);  Surgeon: Shailesh Lay MD;  Location: [facility] OR     PHACOEMULSIFICATION CLEAR CORNEA W/ STANDARD IOL IMPLANT, ENDOSCOPIC CYCLOPHOTOCOAGULATION, COMBINED  11/28/2011    Procedure:COMBINED PHACOEMULSIFICATION CLEAR CORNEA WITH STANDARD INTRAOCULAR LENS IMPLANT, ENDOSCOPIC CYCLOPHOTOCOAGULATION; LEFT EYE PHACOEMULSIFICATION CLEAR CORNEA WITH STANDARD INTRAOCULAR LENS IMPLANT, ENDOSCOPIC CYCLOPHOTOCOAGULATION ; Surgeon:ELOY WARE; Location:[facility]       ALLERGIES     Allergies   Allergen Reactions     Penicillins      Sulfa Drugs        FAMILY HISTORY:  Family History   Problem Relation Age of Onset     Colon Cancer Mother      Myocardial Infarction Maternal Grandmother      Myocardial Infarction Paternal Grandfather      Other Cancer Sister      Leukemia Daughter        SOCIAL HISTORY:  Social History     Social History     Marital status:      Spouse name: N/A     Number of children: N/A     Years of education: N/A     Social History Main Topics     Smoking status: Never Smoker     Smokeless tobacco: Never Used     Alcohol use 0.0 oz/week     0 Standard drinks or equivalent per week      Comment: 3 glasses wine/week     Drug use: No     Sexual activity: Yes  "    Partners: Male     Other Topics Concern     Parent/Sibling W/ Cabg, Mi Or Angioplasty Before 65f 55m? No     Social History Narrative       ROS:   Constitutional: No fever, chills, or sweats. No weight gain/loss   ENT: No visual disturbance, ear ache, epistaxis, sore throat  Allergies/Immunologic: Negative.   Respiratory: No cough, hemoptysia  Cardiovascular: As per HPI  GI: No nausea, vomiting, hematemesis, melena, or hematochezia  : No urinary frequency, dysuria, or hematuria  Integument: Negative  Psychiatric: Negative  Neuro: Negative  Endocrinology: Negative   Musculoskeletal: Negative    EXAM:  /71 (BP Location: Left arm, Patient Position: Chair, Cuff Size: Adult Regular)  Pulse 57  Ht 1.6 m (5' 3\")  Wt 55.9 kg (123 lb 4.8 oz)  SpO2 97%  BMI 21.84 kg/m2  Comf, NAD  MMM  EOMi  CTA B no rales  S1s2 3/6 holosystolic murmer  No cce warm 1+DP    Labs:  LIPID RESULTS:  Lab Results   Component Value Date    CHOL 233 (H) 03/17/2017    HDL 68 03/17/2017     (H) 03/17/2017    TRIG 89 03/17/2017    NHDL 165 (H) 03/17/2017       LIVER ENZYME RESULTS:  Lab Results   Component Value Date    AST 20 05/09/2017    ALT 29 05/09/2017       CBC RESULTS:  Lab Results   Component Value Date    WBC 5.4 06/08/2017    RBC 4.26 06/08/2017    HGB 12.8 06/08/2017    HCT 40.3 06/08/2017    MCV 95 06/08/2017    MCH 30.0 06/08/2017    MCHC 31.8 06/08/2017    RDW 13.8 06/08/2017     06/08/2017       BMP RESULTS:  Lab Results   Component Value Date     06/08/2017    POTASSIUM 4.1 06/08/2017    CHLORIDE 105 06/08/2017    CO2 28 06/08/2017    ANIONGAP 6 06/08/2017    GLC 68 (L) 06/08/2017    BUN 16 06/08/2017    CR 0.66 06/08/2017    GFRESTIMATED 85 06/08/2017    GFRESTBLACK >90   GFR Calc   06/08/2017    NICOLE 8.9 06/08/2017        A1C RESULTS:  No results found for: A1C    INR RESULTS:  Lab Results   Component Value Date    INR 1.30 (H) 06/08/2017    INR 2.00 (H) 06/01/2017 "       Procedures:  Interpretation Summary  Global and regional left ventricular function is normal with an EF of 55-60%.  S/p edge to edge mitral valve repair with a Mitraclip device. The device is  well anchored and stable. There is mild residual mitral regurgitation. The  mean diastolic gradient is 3 mmHg at a herat rate of 54 bpm.  Right ventricular systolic pressure is 26 mmHg above the right atrial  pressure.  The inferior vena cava is normal. Estimated mean right atrial pressure is <3  mmHg.  No pericardial effusion is present.  Compared with 5/9/2017, the degree of pulmonary hypertension has continued to  improve. The gradient across the mitral valve is lower at a slower heart rate.    EKG see HPI    Assessment and Plan: 81F with degenerative MR s/p MitraClip (5/9/17), normal LV, SOHAN, AF on warfarin, HL, seen in clinic for one month follow up. Doing well clinically, echo today shows well seated device in proper position, and only mild residual MR with acceptable inflow gradient (3mmHg), EKG shows SR.    - doing very well  - given low energy symptoms associated with metoprolol, will reduce dose from metoprolol 12.5 bid to metoprolol xl 12.5 daily.  - regarding anticoagulation for PAF (CHADSVASc=3 for age and female), discussed pros and cons of warfarin vs OACs. INR subtherapeutic today and patient would prefer fewer dietary restrictions and monitoring. Will change to apixaban 2.5 BID (age>80, weight<60 kg). Begin today since INR sub-tx. D/c warfarin.  - RTC 1 year with echo the morning of the visit    Discussed with Dr Alireza Roblero  Cardiology Fellow  [phone]    Shailesh Lay MD      Patient Care Team:  Romelia Parker MD as PCP - General (Internal Medicine)  Debora Whalen APRN CNS as Nurse Coordinator (Clinical Nurse Specialist)  Asim Altamirano MD as MD (Cardiology)  Amee Wallace as Nurse Coordinator (Cardiology)  Mary Nelson EP as Cardiac  Rehabilitation Therapist  Shiva Hammonds, RN as Nurse Coordinator  JD ROBBINS

## 2017-06-08 NOTE — MR AVS SNAPSHOT
After Visit Summary   6/8/2017    Frannie Roa    MRN: [id number]           Patient Information     Date Of Birth          1935        Visit Information        Provider Department      6/8/2017 10:30 AM Shailesh Lay MD [facility]        Today's Diagnoses     Atrial fibrillation (H)    -  1    Mitral valve insufficiency, unspecified etiology          Care Instructions    1.  START:  Metoprolol 12.5 mg, by mouth, daily  2.  Follow up with Dr. Lay in one year with an echo and 6 minute walk prior to the visit.  3.  START:  Eliquis (Apixaban) 2.5 mg, by mouth, two times daily    Nursing questions: [phone] option 1 then chose option 3 for the triage nurse, and then ask to speak with Julia.  For emergencies call 911.    It was a pleasure to see you in clinic.  If you have any questions at all, please feel free to give me a call.      Julia Wallace RN  Structural Heart Care Coordinator   TAVR and MitraClip Programs  [facility]  Office: [phone]    Clinics and Surgery Center  [address] [place] [address]  Cardiology Clinic [address] [address]              Follow-ups after your visit        Follow-up notes from your care team     Return in about 1 year (around 6/8/2018), or if symptoms worsen or fail to improve, for Mitral valve regurg, w/Dr. Lay.      Your next 10 appointments already scheduled     Jun 09, 2017  1:00 PM CDT   Cardiac Treatment with Rh Cardiac Rehab 1   [facility] ([facility])    [address], [address]  [facility] [address]   [phone]            Jun 12, 2017  1:00 PM CDT   Cardiac Treatment with Rh Cardiac Rehab 1   [facility] ([facility])    [address], [address]  [facility] [address]   [phone]            Jun 13, 2017  1:00 PM CDT   Cardiac Treatment with Rh Cardiac Rehab 1   [facility] ([facility]  [facility])    [address]  [facility] [address]   [phone]            Jun 19, 2017  1:00 PM CDT   Cardiac Treatment with Rh Cardiac Rehab 1   [facility] ([facility])    [address]  [facility] [address]   [phone]            Jun 20, 2017  1:00 PM CDT   Cardiac Treatment with Rh Cardiac Rehab 1   [facility] ([facility])    [address]  [facility] [address]   [phone]            Jun 23, 2017  1:00 PM CDT   Cardiac Treatment with Rh Cardiac Rehab 1   [facility] ([facility])    [address]  [facility] [address]   [phone]            Jun 23, 2017  2:00 PM CDT   CONSULT with Rh Cardiac Rehab 1   [facility] ([facility])    [address]  [facility] [address]   [phone]            Jun 26, 2017  1:00 PM CDT   Cardiac Treatment with Rh Cardiac Rehab 1   [facility] ([facility])    [address]  [facility] [address]   [phone]            Jun 27, 2017  1:00 PM CDT   Cardiac Treatment with Rh Cardiac Rehab 1   [facility] ([facility])    [address]  [facility] [address]   [phone]            Jun 30, 2017  1:00 PM CDT   Cardiac Treatment with Rh Cardiac Rehab 1   [facility] ([facility])    [address]  [facility] [address]   [phone]              Future tests that were ordered for you today     Open Future Orders        Priority Expected Expires Ordered    Echocardiogram Complete Routine  6/8/2018 6/8/2017    6 minute walk Routine  12/5/2017 6/8/2017            Who to contact     If you have questions or need follow up information about today's clinic visit or your schedule please contact [facility]  "[facility] directly at [phone].  Normal or non-critical lab and imaging results will be communicated to you by MyChart, letter or phone within 4 business days after the clinic has received the results. If you do not hear from us within 7 days, please contact the clinic through [employer] or phone. If you have a critical or abnormal lab result, we will notify you by phone as soon as possible.  Submit refill requests through [employer] or call your pharmacy and they will forward the refill request to us. Please allow 3 business days for your refill to be completed.          Additional Information About Your Visit        [employer] Information     [employer] lets you send messages to your doctor, view your test results, renew your prescriptions, schedule appointments and more. To sign up, go to [email or web address] . Click on \"Log in\" on the left side of the screen, which will take you to the Welcome page. Then click on \"Sign up Now\" on the right side of the page.     You will be asked to enter the access code listed below, as well as some personal information. Please follow the directions to create your username and password.     Your access code is: [email or web address]  Expires: 2017  2:38 PM     Your access code will  in 90 days. If you need help or a new code, please call your [place] clinic or [phone].        Care EveryWhere ID     This is your Care EveryWhere ID. This could be used by other organizations to access your [place] medical records  [id number]        Your Vitals Were     Pulse Height Pulse Oximetry BMI (Body Mass Index)          57 1.6 m (5' 3\") 97% 21.84 kg/m2         Blood Pressure from Last 3 Encounters:   17 108/71   17 102/60   05/10/17 114/71    Weight from Last 3 Encounters:   17 55.9 kg (123 lb 4.8 oz)   17 56.4 kg (124 lb 6.4 oz)   17 54.9 kg (121 lb)              We Performed the Following     EKG 12-lead, tracing only (Future)     EKG 12-lead, " tracing only (Future)          Today's Medication Changes          These changes are accurate as of: 6/8/17  2:38 PM.  If you have any questions, ask your nurse or doctor.               Start taking these medicines.        Dose/Directions    apixaban ANTICOAGULANT 2.5 MG tablet   Commonly known as:  ELIQUIS   Used for:  Atrial fibrillation (H)   Started by:  Shailesh Lay MD        Dose:  2.5 mg   Take 1 tablet (2.5 mg) by mouth 2 times daily   Quantity:  180 tablet   Refills:  3       metoprolol 25 MG 24 hr tablet   Commonly known as:  TOPROL-XL   Used for:  Mitral valve insufficiency, unspecified etiology   Started by:  Shailesh Lay MD        Dose:  12.5 mg   Take 0.5 tablets (12.5 mg) by mouth daily   Quantity:  45 tablet   Refills:  3         Stop taking these medicines if you haven't already. Please contact your care team if you have questions.     metoprolol 25 MG tablet   Commonly known as:  LOPRESSOR   Stopped by:  Shailesh Lay MD           warfarin 5 MG tablet   Commonly known as:  COUMADIN   Stopped by:  Shailesh Lay MD                Where to get your medicines      These medications were sent to [facility] [address] [place] [address] - [address] [facility] [address]  [address] [facility] [address]     Phone:  [phone]     metoprolol 25 MG 24 hr tablet         Call your pharmacy to confirm that your medication is ready for pickup. It may take up to 24 hours for them to receive the prescription. If the prescription is not ready within 3 business days, please contact your clinic or your provider.     We will let you know when these medications are ready. If you don't hear back within 3 business days, please contact us.     apixaban ANTICOAGULANT 2.5 MG tablet                Primary Care Provider Office Phone # Fax #    Romelia Parker [phone]       [facility] [address]  [place] [address]         Thank you!     Thank you for choosing [facility]  for your care. Our goal is always to provide you with excellent care. Hearing back from our patients is one way we can continue to improve our services. Please take a few minutes to complete the written survey that you may receive in the mail after your visit with us. Thank you!             Your Updated Medication List - Protect others around you: Learn how to safely use, store and throw away your medicines at www.disposemymeds.org.          This list is accurate as of: 6/8/17  2:38 PM.  Always use your most recent med list.                   Brand Name Dispense Instructions for use    apixaban ANTICOAGULANT 2.5 MG tablet    ELIQUIS    180 tablet    Take 1 tablet (2.5 mg) by mouth 2 times daily       ASPIRIN PO      Take 81 mg by mouth daily       dorzolamide-timolol 2-0.5 % ophthalmic solution    COSOPT     Place 1 drop Into the left eye 2 times daily       metoprolol 25 MG 24 hr tablet    TOPROL-XL    45 tablet    Take 0.5 tablets (12.5 mg) by mouth daily       travoprost Z (benzalkonium) 0.004 % ophthalmic solution    TRAVATAN Z     Place 1 drop into both eyes every evening       zinc sulfate 220 (50 ZN) MG capsule    ZINCATE     Take 220 mg by mouth 2 times daily

## 2017-06-09 ENCOUNTER — HOSPITAL ENCOUNTER (OUTPATIENT)
Dept: CARDIAC REHAB | Facility: CLINIC | Age: 82
End: 2017-06-09
Attending: NURSE PRACTITIONER
Payer: MEDICARE

## 2017-06-09 LAB — INTERPRETATION ECG - MUSE: NORMAL

## 2017-06-09 PROCEDURE — 93798 PHYS/QHP OP CAR RHAB W/ECG: CPT | Performed by: OCCUPATIONAL THERAPIST

## 2017-06-09 PROCEDURE — 40000116 ZZH STATISTIC OP CR VISIT: Performed by: OCCUPATIONAL THERAPIST

## 2017-06-12 ENCOUNTER — HOSPITAL ENCOUNTER (OUTPATIENT)
Dept: CARDIAC REHAB | Facility: CLINIC | Age: 82
End: 2017-06-12
Attending: NURSE PRACTITIONER
Payer: MEDICARE

## 2017-06-12 PROCEDURE — 93798 PHYS/QHP OP CAR RHAB W/ECG: CPT

## 2017-06-12 PROCEDURE — 40000116 ZZH STATISTIC OP CR VISIT

## 2017-06-13 ENCOUNTER — HOSPITAL ENCOUNTER (OUTPATIENT)
Dept: CARDIAC REHAB | Facility: CLINIC | Age: 82
End: 2017-06-13
Attending: NURSE PRACTITIONER
Payer: MEDICARE

## 2017-06-13 PROCEDURE — 40000116 ZZH STATISTIC OP CR VISIT

## 2017-06-13 PROCEDURE — 93798 PHYS/QHP OP CAR RHAB W/ECG: CPT

## 2017-06-14 ENCOUNTER — TELEPHONE (OUTPATIENT)
Dept: INTERNAL MEDICINE | Facility: CLINIC | Age: 82
End: 2017-06-14

## 2017-06-14 ENCOUNTER — NURSE TRIAGE (OUTPATIENT)
Dept: NURSING | Facility: CLINIC | Age: 82
End: 2017-06-14

## 2017-06-14 ENCOUNTER — OFFICE VISIT (OUTPATIENT)
Dept: INTERNAL MEDICINE | Facility: CLINIC | Age: 82
End: 2017-06-14
Payer: MEDICARE

## 2017-06-14 VITALS
HEIGHT: 63 IN | WEIGHT: 123 LBS | DIASTOLIC BLOOD PRESSURE: 58 MMHG | TEMPERATURE: 98.2 F | SYSTOLIC BLOOD PRESSURE: 108 MMHG | OXYGEN SATURATION: 98 % | HEART RATE: 68 BPM | BODY MASS INDEX: 21.79 KG/M2

## 2017-06-14 DIAGNOSIS — S80.11XA CONTUSION OF RIGHT LOWER LEG, INITIAL ENCOUNTER: Primary | ICD-10-CM

## 2017-06-14 DIAGNOSIS — Z98.890 HISTORY OF MITRAL VALVE REPAIR: ICD-10-CM

## 2017-06-14 DIAGNOSIS — Z79.01 LONG-TERM (CURRENT) USE OF ANTICOAGULANTS: ICD-10-CM

## 2017-06-14 PROCEDURE — 99213 OFFICE O/P EST LOW 20 MIN: CPT | Performed by: FAMILY MEDICINE

## 2017-06-14 NOTE — PROGRESS NOTES
.  SUBJECTIVE:                                                    Frannie Roa is a 81 year old female who presents to clinic today for the following health issues:    Bruise R shin.      HISTORY    She noticed a bruise on lower R shin today. Not tender. She is concerned about the bruising. No other bruises noted by her.    She is on Warfarin with a plan to switch to Eliquis when approved by insurance. Last INR was 1.3 and Warfarin dose was increased.    She had a mitral valve procedure recently which went well.    Patient Active Problem List   Diagnosis     Osteoporosis     Systolic murmur     Myxomatous mitral valve regurgitation     SVT (supraventricular tachycardia) (H)     Hyperlipidemia     Advanced directives, counseling/discussion     Chronic systolic congestive heart failure (H)     Long-term (current) use of anticoagulants [Z79.01]     Atrial fibrillation (H) [I48.91]     Status post coronary angiogram     Mitral regurgitation     Current Outpatient Prescriptions   Medication Sig Dispense Refill     Warfarin Sodium (COUMADIN PO)        metoprolol (TOPROL-XL) 25 MG 24 hr tablet Take 0.5 tablets (12.5 mg) by mouth daily 45 tablet 3     ASPIRIN PO Take 81 mg by mouth daily       zinc sulfate (ZINCATE) 220 MG capsule Take 220 mg by mouth 2 times daily       dorzolamide-timolol (COSOPT) 2-0.5 % ophthalmic solution Place 1 drop Into the left eye 2 times daily        travoprost Z, benzalkonium, (TRAVATAN Z) 0.004 % ophthalmic solution Place 1 drop into both eyes every evening        apixaban ANTICOAGULANT (ELIQUIS) 2.5 MG tablet Take 1 tablet (2.5 mg) by mouth 2 times daily (Patient not taking: Reported on 6/14/2017) 180 tablet 3       REVIEW OF SYSTEMS    No SOB  No palpitations  No abd pain  No dizziness or faintness      Past Medical History:   Diagnosis Date     Arthritis     osteoarthritis     Atrial fibrillation (H)      Bronchitis 02/2017     Glaucoma      Hyperlipidemia 2/1/2015     Myxomatous  "mitral valve regurgitation 2014     SVT (supraventricular tachycardia) (H) 2014     Systolic murmur 2014       EXAM  /58 (BP Location: Left arm, Patient Position: Chair, Cuff Size: Adult Regular)  Pulse 68  Temp 98.2  F (36.8  C) (Oral)  Ht 5' 3\" (1.6 m)  Wt 123 lb (55.8 kg)  SpO2 98%  BMI 21.79 kg/m2    NAD  Chest: cl  CV: RSR 2/6 sys murmur  R le cm area of resolving ecchymosis lower R shin with mild swelling  Skin: no additional bruising noted      (S80.11XA) Contusion of right lower leg, initial encounter  (primary encounter diagnosis)  Comment:   I suspect this is likely cause. Looks like a 4-5 day old bruise and is in a likely location.  Plan: observation, monitor INR as usual.    (Z79.01) Long-term (current) use of anticoagulants [Z79.01]  Comment: Warfarin.  Plan:     (Z98.890) History of mitral valve repair  Comment: noted, seems to be doing well.  Plan:               "

## 2017-06-14 NOTE — MR AVS SNAPSHOT
After Visit Summary   6/14/2017    Frannie Roa    MRN: [id number]           Patient Information     Date Of Birth          1935        Visit Information        Provider Department      6/14/2017 1:40 PM Clinton Thomas MD [facility]        Today's Diagnoses     Contusion of right lower leg, initial encounter    -  1    Long-term (current) use of anticoagulants [Z79.01]        History of mitral valve repair           Follow-ups after your visit        Your next 10 appointments already scheduled     Migue 15, 2017  1:30 PM CDT   LAB with CR LAB   [facility] ([facility]    [id number] [place] [address]   [phone]           Patient must bring picture ID.  Patient should be prepared to give a urine specimen  Please do not eat 10-12 hours before your appointment if you are coming in fasting for labs on lipids, cholesterol, or glucose (sugar).  Pregnant women should follow their Care Team instructions. Water with medications is okay. Do not drink coffee or other fluids.   If you have concerns about taking  your medications, please ask at office or if scheduling via [employer], send a message by clicking on Secure Messaging, Message Your Care Team.            Jun 19, 2017  1:00 PM CDT   Cardiac Treatment with Rh Cardiac Rehab 1   [facility] ([facility])    [address], [address]  [facility] [address]   [phone]            Jun 20, 2017  1:00 PM CDT   Cardiac Treatment with Rh Cardiac Rehab 1   [facility] ([facility])    [address], [address]  [facility] [address]   [phone]            Jun 27, 2017  1:00 PM CDT   Cardiac Treatment with Rh Cardiac Rehab 1   [facility] ([facility])    [address]  [facility] [address]   [phone]            Jul 06, 2017  1:00 PM CDT    Cardiac Treatment with Rh Cardiac Rehab 3   [facility] ([facility])    [address]  [facility] [address]   [phone]            Jul 10, 2017  1:00 PM CDT   Cardiac Treatment with Rh Cardiac Rehab 1   [facility] ([facility])    [address]  [facility] [address]   [phone]            Jul 11, 2017  1:00 PM CDT   Cardiac Treatment with Rh Cardiac Rehab 1   [facility] ([facility])    [address]  [facility] [address]   [phone]            Jul 14, 2017  1:00 PM CDT   Cardiac Treatment with Rh Cardiac Rehab 1   [facility] ([facility])    [address]  [facility] [address]   [phone]            Jul 14, 2017  2:00 PM CDT   CONSULT with Rh Cardiac Rehab 1   [facility] ([facility])    [address]  [facility] [address]   [phone]            Jul 17, 2017  1:00 PM CDT   Cardiac Treatment with Rh Cardiac Rehab 1   [facility] ([facility])    [address]  [facility] [address]   [phone]              Who to contact     If you have questions or need follow up information about today's clinic visit or your schedule please contact [facility] directly at [phone].  Normal or non-critical lab and imaging results will be communicated to you by MyChart, letter or phone within 4 business days after the clinic has received the results. If you do not hear from us within 7 days, please contact the clinic through MyChart or phone. If you have a critical or abnormal lab result, we will notify you by phone as soon as possible.  Submit refill requests through [facility] or call your pharmacy and they will forward the refill request to us. Please allow 3 business days for your  "refill to be completed.          Additional Information About Your Visit        [employer] Information     [facility] lets you send messages to your doctor, view your test results, renew your prescriptions, schedule appointments and more. To sign up, go to [email or web address] . Click on \"Log in\" on the left side of the screen, which will take you to the Welcome page. Then click on \"Sign up Now\" on the right side of the page.     You will be asked to enter the access code listed below, as well as some personal information. Please follow the directions to create your username and password.     Your access code is: [email or web address]  Expires: 2017  2:38 PM     Your access code will  in 90 days. If you need help or a new code, please call your [place] clinic or [phone].        Care EveryWhere ID     This is your [facility] EveryWhere ID. This could be used by other organizations to access your [place] medical records  [id number]        Your Vitals Were     Pulse Temperature Height Pulse Oximetry BMI (Body Mass Index)       68 98.2  F (36.8  C) (Oral) 5' 3\" (1.6 m) 98% 21.79 kg/m2        Blood Pressure from Last 3 Encounters:   17 108/58   17 108/71   17 102/60    Weight from Last 3 Encounters:   17 123 lb (55.8 kg)   17 123 lb 4.8 oz (55.9 kg)   17 124 lb 6.4 oz (56.4 kg)              Today, you had the following     No orders found for display       Primary Care Provider Office Phone # Fax #    Romelia Parker [phone]       [facility] [address]  [facility] [address]        Thank you!     Thank you for choosing [facility]  for your care. Our goal is always to provide you with excellent care. Hearing back from our patients is one way we can continue to improve our services. Please take a few minutes to complete the written survey that you may receive in the mail after your visit with us. Thank you!           "   Your Updated Medication List - Protect others around you: Learn how to safely use, store and throw away your medicines at www.disposemymeds.org.          This list is accurate as of: 6/14/17  9:03 PM.  Always use your most recent med list.                   Brand Name Dispense Instructions for use    apixaban ANTICOAGULANT 2.5 MG tablet    ELIQUIS    180 tablet    Take 1 tablet (2.5 mg) by mouth 2 times daily       ASPIRIN PO      Take 81 mg by mouth daily       COUMADIN PO          dorzolamide-timolol 2-0.5 % ophthalmic solution    COSOPT     Place 1 drop Into the left eye 2 times daily       metoprolol 25 MG 24 hr tablet    TOPROL-XL    45 tablet    Take 0.5 tablets (12.5 mg) by mouth daily       travoprost Z (benzalkonium) 0.004 % ophthalmic solution    TRAVATAN Z     Place 1 drop into both eyes every evening       zinc sulfate 220 (50 ZN) MG capsule    ZINCATE     Take 220 mg by mouth 2 times daily

## 2017-06-14 NOTE — TELEPHONE ENCOUNTER
"Patient calling reporting right lower leg swelling on \"shin\". Reporting 3 inch x 2 inch area of \"green bruising.\"   Patient reporting she has been taking Coumadin x 1 month.     Reason for Disposition    [1] MODERATE leg swelling (e.g., swelling extends up to knees) AND [2] new onset or worsening    Additional Information    Negative: Chest pain    Negative: Followed an ankle injury    Negative: Ankle pain is main symptom    Negative: Swelling of both ankles (i.e., pedal edema)    Swelling of calf or leg    Negative: Severe difficulty breathing (e.g., struggling for each breath, speaks in single words)    Negative: Looks like a broken bone or dislocated joint (e.g., crooked or deformed)    Negative: Sounds like a life-threatening emergency to the triager    Negative: Chest pain    Negative: Followed a leg injury    Negative: [1] Small area of swelling AND [2] followed an insect bite to the area    Negative: Swelling of one ankle joint    Negative: Swelling of knee is main symptom    Negative: Pregnant    Negative: Postpartum (< 1 month since delivery)    Negative: Difficulty breathing at rest    Negative: Entire foot is cool or blue in comparison to other side    Negative: [1] Can't walk or can barely walk AND [2] new onset    Negative: [1] Difficulty breathing with exertion (e.g., walking) AND [2] new onset or worsening    Negative: [1] Red area or streak AND [2] fever    Negative: [1] Swelling is painful to touch AND [2] fever    Negative: [1] Cast on leg or ankle AND [2] now increased pain    Negative: Patient sounds very sick or weak to the triager    Negative: SEVERE leg swelling (e.g., swelling extends above knee, entire leg is swollen, weeping fluid)    Negative: [1] Red area or streak [2] large (> 2 in. or 5 cm)    Negative: [1] Thigh or calf pain AND [2] only 1 side AND [3] present > 1 hour    Negative: [1] Thigh, calf, or ankle swelling AND [2] only 1 side    Negative: [1] Thigh, calf, or ankle swelling " AND [2] bilateral AND [3] 1 side is more swollen    Protocols used: LEG SWELLING AND EDEMA-ADULT-AH, ANKLE SWELLING-ADULT-AH

## 2017-06-14 NOTE — NURSING NOTE
"Chief Complaint   Patient presents with     Bleeding/Bruising     pt states had heart surgey x 6 weeks ago and was started on blood thinners and woke up today with a bruise on her rt shin sore and a hard spot.       Initial /58 (BP Location: Left arm, Patient Position: Chair, Cuff Size: Adult Regular)  Pulse 68  Temp 98.2  F (36.8  C) (Oral)  Ht 5' 3\" (1.6 m)  Wt 123 lb (55.8 kg)  SpO2 98%  BMI 21.79 kg/m2 Estimated body mass index is 21.79 kg/(m^2) as calculated from the following:    Height as of this encounter: 5' 3\" (1.6 m).    Weight as of this encounter: 123 lb (55.8 kg).  Medication Reconciliation: complete    "

## 2017-06-14 NOTE — TELEPHONE ENCOUNTER
Patient had called in and spoke with on of the FNA RN;Patient is on new blood thinner med and is experiencing bruise and pain. I was unable to schedule her with pcp however she is see  at 1:40 PM. Patient would preferred to see pcp instead. Please follow up with patient.

## 2017-06-15 ENCOUNTER — TELEPHONE (OUTPATIENT)
Dept: CARDIOLOGY | Facility: CLINIC | Age: 82
End: 2017-06-15

## 2017-06-15 ENCOUNTER — ANTICOAGULATION THERAPY VISIT (OUTPATIENT)
Dept: ANTICOAGULATION | Facility: CLINIC | Age: 82
End: 2017-06-15

## 2017-06-15 DIAGNOSIS — I48.0 PAROXYSMAL ATRIAL FIBRILLATION (H): ICD-10-CM

## 2017-06-15 DIAGNOSIS — Z79.01 LONG-TERM (CURRENT) USE OF ANTICOAGULANTS: ICD-10-CM

## 2017-06-15 LAB — INR PPP: 2.3 (ref 0.86–1.14)

## 2017-06-15 PROCEDURE — 36416 COLLJ CAPILLARY BLOOD SPEC: CPT | Performed by: FAMILY MEDICINE

## 2017-06-15 PROCEDURE — 85610 PROTHROMBIN TIME: CPT | Performed by: FAMILY MEDICINE

## 2017-06-15 NOTE — MR AVS SNAPSHOT
Frannie JC Janina   6/15/2017   Anticoagulation Therapy Visit    Description:  81 year old female   Provider:  Priscilla London, RN   Department:  [facility]           INR as of 6/15/2017     Today's INR 2.30      Anticoagulation Summary as of 6/15/2017     INR goal    Prior goal 2.0-3.0   Today's INR 2.30   Full instructions 7.5 mg on Mon, Wed, Fri; 10 mg all other days   Next INR check 6/22/2017    Indications   Long-term (current) use of anticoagulants [Z79.01] [Z79.01]  Atrial fibrillation (H) [I48.91] [I48.91]         June 2017 Details    Sun Mon Tue Wed Thu Fri Sat         1               2               3                 4               5               6               7               8               9               10                 11               12               13               14               15      10 mg   See details      16      7.5 mg         17      10 mg           18      10 mg         19      7.5 mg         20      10 mg         21      7.5 mg         22            23               24                 25               26               27               28               29               30                 Date Details   06/15 This INR check       Date of next INR:  6/22/2017         How to take your warfarin dose     To take:  7.5 mg Take 1.5 of the 5 mg tablets.    To take:  10 mg Take 2 of the 5 mg tablets.

## 2017-06-15 NOTE — TELEPHONE ENCOUNTER
Tres from [facility] calling needing some questions answered to he can try and get medication covered for this patient.  Please call him @ [phone] ext. [phone]

## 2017-06-15 NOTE — PROGRESS NOTES
ANTICOAGULATION FOLLOW-UP CLINIC VISIT    Patient Name:  Frannie Roa  Date:  6/15/2017  Contact Type:  Telephone    SUBJECTIVE:     Patient Findings     Positives No Problem Findings           OBJECTIVE    INR   Date Value Ref Range Status   06/15/2017 2.30 (H) 0.86 - 1.14 Final     Comment:     This test is intended for monitoring Coumadin therapy.  Results are not   accurate   in patients with prolonged INR due to factor deficiency.         ASSESSMENT / PLAN  INR assessment THER    Recheck INR In: 1 WEEK    INR Location Clinic      Anticoagulation Summary as of 6/15/2017     INR goal    Prior goal 2.0-3.0   Today's INR 2.30   Maintenance plan 7.5 mg (5 mg x 1.5) on Mon, Wed, Fri; 10 mg (5 mg x 2) all other days   Full instructions 7.5 mg on Mon, Wed, Fri; 10 mg all other days   Weekly total 62.5 mg   No change documented Priscilla London, RN   Plan last modified Radha Gould RN (6/8/2017)   Next INR check 6/22/2017   Priority INR   Target end date     Indications   Long-term (current) use of anticoagulants [Z79.01] [Z79.01]  Atrial fibrillation (H) [I48.91] [I48.91]         Anticoagulation Episode Summary     INR check location     Preferred lab     Send INR reminders to [facility]    Comments As of 3/20/17: +++++ INR goal range +++2.2-2.7++   HIPAA OK to speak with Parminder Alonzo       Anticoagulation Care Providers     Provider Role Specialty Phone number    Asim Altamirano MD Responsible Cardiology [phone]            See the Encounter Report to view Anticoagulation Flowsheet and Dosing Calendar (Go to Encounters tab in chart review, and find the Anticoagulation Therapy Visit)    Spoke with Clinton.    Priscilla London, IMLES       Spoke with Frannie.  She calls back again to report that she again does not want to keep dose that she did over the past week because her INR went up a whole point.  Frannie again wants to resume 7.5mg daily.  Clearly Frannie needs more warfarin than that and  the numbers reflect this.  She is willing to take 10mg twice for the next week with 7.5mg all other days.  INR on 6/22.  She will take 10mg Mon, Fri and 7.5mg all other days.  She also is waiting for a prior authorization on her prescription for Eloquis.

## 2017-06-16 ENCOUNTER — CARE COORDINATION (OUTPATIENT)
Dept: CARDIOLOGY | Facility: CLINIC | Age: 82
End: 2017-06-16

## 2017-06-16 ENCOUNTER — HOSPITAL ENCOUNTER (OUTPATIENT)
Facility: CLINIC | Age: 82
End: 2017-06-16
Attending: OPHTHALMOLOGY | Admitting: OPHTHALMOLOGY
Payer: MEDICARE

## 2017-06-16 NOTE — PROGRESS NOTES
Tres from [facility] calling needing some questions answered to he can try and get medication covered for this patient.  Please call him @ [phone] ext. [phone].      Returned the telephone call to Tres, and left a voice message for him to return my telephone call regarding the medication issue he was referencing.

## 2017-06-19 ENCOUNTER — HOSPITAL ENCOUNTER (OUTPATIENT)
Dept: CARDIAC REHAB | Facility: CLINIC | Age: 82
End: 2017-06-19
Attending: NURSE PRACTITIONER
Payer: MEDICARE

## 2017-06-19 ENCOUNTER — CARE COORDINATION (OUTPATIENT)
Dept: CARDIOLOGY | Facility: CLINIC | Age: 82
End: 2017-06-19

## 2017-06-19 ENCOUNTER — ANTICOAGULATION THERAPY VISIT (OUTPATIENT)
Dept: ANTICOAGULATION | Facility: CLINIC | Age: 82
End: 2017-06-19

## 2017-06-19 VITALS — HEIGHT: 63 IN | WEIGHT: 132.2 LBS | BODY MASS INDEX: 23.42 KG/M2

## 2017-06-19 DIAGNOSIS — I48.0 PAROXYSMAL ATRIAL FIBRILLATION (H): ICD-10-CM

## 2017-06-19 DIAGNOSIS — Z79.01 LONG-TERM (CURRENT) USE OF ANTICOAGULANTS: ICD-10-CM

## 2017-06-19 LAB — INR PPP: 2.3 (ref 0.86–1.14)

## 2017-06-19 PROCEDURE — 40000116 ZZH STATISTIC OP CR VISIT: Performed by: OCCUPATIONAL THERAPIST

## 2017-06-19 PROCEDURE — 85610 PROTHROMBIN TIME: CPT | Performed by: FAMILY MEDICINE

## 2017-06-19 PROCEDURE — 93798 PHYS/QHP OP CAR RHAB W/ECG: CPT | Performed by: OCCUPATIONAL THERAPIST

## 2017-06-19 PROCEDURE — 36416 COLLJ CAPILLARY BLOOD SPEC: CPT | Performed by: FAMILY MEDICINE

## 2017-06-19 ASSESSMENT — 6 MINUTE WALK TEST (6MWT)
PREDICTED: 1286.4
TOTAL DISTANCE WALKED (FT): 1413
GENDER SELECTION: FEMALE
FEMALE CALC: 1308.1
MALE CALC: 1278.6

## 2017-06-19 NOTE — MR AVS SNAPSHOT
Frannie JC Janina   6/19/2017   Anticoagulation Therapy Visit    Description:  81 year old female   Provider:  Mary Sepulveda, RN   [facility]           INR as of 6/19/2017     Today's INR No new INR was available at the time of this encounter.      Anticoagulation Summary as of 6/19/2017     INR goal    Prior goal 2.0-3.0   Today's INR No new INR was available at the time of this encounter.   Full instructions 7.5 mg on Mon, Wed, Fri; 10 mg all other days   Next INR check 6/19/2017    Indications   Long-term (current) use of anticoagulants [Z79.01] [Z79.01]  Atrial fibrillation (H) [I48.91] [I48.91]         Your next Anticoagulation Clinic appointment(s)     Jun 19, 2017  2:30 PM CDT   Anticoagulation Visit with [facility] CLINIC   [facility]    [address] [place] [address]   [phone]              June 2017 Details    Sun Mon Tue Wed Thu Fri Sat         1               2               3                 4               5               6               7               8               9               10                 11               12               13               14               15               16               17                 18               19      See details      20               21               22               23               24                 25               26               27               28               29               30                 Date Details   06/19 This INR check       Date of next INR:  6/19/2017         How to take your warfarin dose     To take:  7.5 mg Take 1.5 of the 5 mg tablets.

## 2017-06-19 NOTE — PROGRESS NOTES
ANTICOAGULATION FOLLOW-UP CLINIC VISIT    Patient Name:  Frannie Roa  Date:  6/19/2017  Contact Type:  Telephone    SUBJECTIVE:     Patient Findings     Comments Frannie will start Apixaban when INR<2.0.             OBJECTIVE    INR   Date Value Ref Range Status   06/19/2017 2.30 (H) 0.86 - 1.14 Final     Comment:     This test is intended for monitoring Coumadin therapy.  Results are not   accurate   in patients with prolonged INR due to factor deficiency.         ASSESSMENT / PLAN  INR assessment THER    Recheck INR In: 2 DAYS    INR Location Clinic      Anticoagulation Summary as of 6/19/2017     INR goal    Prior goal 2.0-3.0   Today's INR 2.30   Maintenance plan 7.5 mg (5 mg x 1.5) on Mon, Wed, Fri; 10 mg (5 mg x 2) all other days   Full instructions 6/19: Hold; 6/20: Hold; Otherwise 7.5 mg on Mon, Wed, Fri; 10 mg all other days   Weekly total 62.5 mg   Plan last modified Radha Gould RN (6/8/2017)   Next INR check 6/21/2017   Priority INR   Target end date     Indications   Long-term (current) use of anticoagulants [Z79.01] [Z79.01]  Atrial fibrillation (H) [I48.91] [I48.91]         Anticoagulation Episode Summary     INR check location     Preferred lab     Send INR reminders to  [facility]    Comments As of 3/20/17: +++++ INR goal range +++2.2-2.7++   HIPAA OK to speak with Parminder Alonzo       Anticoagulation Care Providers     Provider Role Specialty Phone number    Asim Altamirano MD Responsible Cardiology [phone]            See the Encounter Report to view Anticoagulation Flowsheet and Dosing Calendar (Go to Encounters tab in chart review, and find the Anticoagulation Therapy Visit)    Left message for patient with results and dosing recommendations. Asked patient to call back to report any missed doses, falls, signs and symptoms of bleeding or clotting, any changes in health, medication, or diet. Asked patient to call back with any questions or concerns.     Priscilla COHEN  MILES London     ADDENDUM:  Pt calls to report she had an INR today, result of 2.3.  She will plan to get another INR on 6/21 & call us with the result.  She is advised this will most likely be < 2.0 & she can start the new medication @ that time.  Marisel AQUINO

## 2017-06-19 NOTE — PROGRESS NOTES
Patient is to start Elliquis soon.  She is going to have an INR done either today or tomorrow, and when it is less than 2.0, Elliquis can start. Patient will not be taking Coumadin today ,6/19.

## 2017-06-19 NOTE — PROGRESS NOTES
(Received this message from Tres on Miguelina 15:)  Tres from [facility] calling needing some questions answered to he can try and get medication covered for this patient.  Please call him @ [phone] ext. [phone].        Returned the telephone call to Tres a SECOND time, as my first message left did not get a return phone call, and left a voice message for him to return my telephone call regarding the medication issue he was referencing.      Then, I called Frannie, and left a message on her telephone:  I was wondering if the medication Eliquis was approved, or not, and that I was following up on this to see if any assistance was needed.

## 2017-06-19 NOTE — PROGRESS NOTES
Frannie Roa   81 year old  Mitral Valve Clip Repair 06/19/17 1400   Session   Session 60 Day Individualized Treatment Plan   Certified through this date 08/05/17   Physician cosignature/electronic signature indicates approval of this ITP document. I have established, reviewed and made necessary changes to the individualized treatment plan and exercise prescription for this patient.   Cardiac Rehab Assessment   Cardiac Rehab Assessment 5/18/17 Pt. presents to intial evaluation following a mitral valve clip on 5/8/17. Pt. has been followed for a few years after physicians heard a heart murmur and noticed the mitral valve was slightly leaky. Procedure was successful and pt. was discharged on 5/9/17. Later that day while at home, pt. noticed her heart rate felt very fast. Pt. went back into the ER, but was found to be in SR. Pt. has a history of afib starting back in March 2017 and was placed on coumadin. Pt. is currently wearing an event monitor for 2 weeks. Pt. has not felt any palpatations or increased rates. Pt. is a very active individual who was participating at [employer] using the stair climber, biking outside 20-25 miles, and going to 30 minute walks before the procedure. Pt. is feeling fine since discharge from the ER and is looking forward to resuming her previous activity level.6/8/17 ITP forwarded for review by medical director. 6/19/2017 Progress update done today. PT does very well for her age. She is walking regularly and gardening in her yard. Her appetite is good and she is sleeping well at night. PT will be switching from warfarin to eliquis soon. Her rhythm has been stable. She continues to benefit from skilled monitoring with exercise levels due to history of atrial fibrillation.   General Information   Treatment Diagnosis Valve Repair  (Mitral Clip)   Date of Treatment Diagnosis 05/08/17   Significant Past CV History None   Comorbidities None   Lead up symptoms [facility] followed  "for 3 years   Hospital Location [facility]   Hospital Discharge Date 05/09/17   Signs and Symptoms Post Hospital Discharge Fatigue;Lightheadedness   Outpatient Cardiac Rehab Start Date 05/18/17   Primary Physician Dr. Parker   Primary Physician Follow Up Completed   Surgeon Dr. Phan   Surgeon Follow Up Scheduled   Cardiologist Dr. Altamirano   Cardiologist Follow Up Advised to schedule appointment   Ejection Fraction 60-65%   Risk Stratification Low   Summary of Cath Report   Summary of Cath Report No Significant CAD   Living and Work Status    Living Arrangements and Social Status house   Support System Live with an adult   Return to Employment Retired   Occupation , professional    Preventative Medications   CMS recommended medications Beta Blocker;Antiplatelets;Anticoagulants;Influenza vaccination;Pneumonia vaccination   Falls Screen   Have you fallen two or more times in the past year? No   Have you fallen and had an injury in the past year? No   Referral Initiated to Physical Therapy No   Pain   Patient Currently in Pain Denies   Physical Assessments   Incisions WNL   Edema None   Right Lung Sounds not assessed   Left Lung Sounds not assessed   Limitations No limitations   Individualized Treatment Plan   Monitored Sessions Scheduled 24   Monitored Sessions Attended 8   Nutrition Management - Weight Management   Assessment Re-assessment   Age 81   Weight 60 kg (132 lb 3.2 oz)   Height 1.6 m (5' 2.99\")   BMI (Calculated) 23.47   Initial Rate Your Plate Score. Dietary tool to assess eating patterns. Scores range from 24 to 72. The higher the score the healthier the eating pattern. 68   Weight Management Comments 6/19/2017 Her weight is appropriate and BMI is <25.   Nutrition Management - Lipids   Lipids Labs Available   Date 03/17/17   Total Cholesterol 233   Triglycerides 89   HDL 68      Prescribed Lipid Medication No   Nutrition Management - Diabetes   Diabetes No   Nutrition " Management Summary   Dietary Recommendations Anticoagulation Therapy   Stages of Change for Diet Compliance Action   Interventions Planned Attend Nutrition Education Class(es)   Nutrition Summary Comments 17 Pt. feels she eats very healthy and knows what to do. 2017 No change.   Psychosocial Management   Psychosocial Assessment Re-assessment   Is there history of clinical depression or increased risk of depression? No previous history   Current Level of Stress per Patient Report Mild   Current Coping Skills Has Positive Support System;Uses Stress Management/Relaxation Techniques   Initial Patient Health Questionnaire -9 Score (PHQ-9) for depression. 5-9 Minimal symptoms, 10-14 Minor depression, 15-19 Major depression, moderately severe, > 20 Major depression, severe  2   Initial [facility] Survey score.  Quality of Life:   If total score > 25 review individual areas where patient rated a 4 or 5.  Consider patients current medical condition and what role that plays on the score.   Adjust treatment protocol to improve areas of concern.  Consider the following:  PHQ9 score, DASI, and re-assessment within the next 30 days to assist with developing treatments.  16   Stages of Change Maintenance   Interventions Planned Patient to verbalize understanding of negative impact of stress to personal health;Patient to verbalize understanding of behavioral assessment results;Patient will recognize signs and symptoms of depression   Patient Goal No   Psychosocial Comments 2017 PT denies any depression and PHQ score is wnl. Her brother recently  and she will be going to the  out of state. He had been ill for a while. PT has a supportive network of friends.    Other Core Components - Hypertension   History of or Diagnosis of Hypertension No   Currently taking Anti-Hypertensives Yes;Beta blocker   Hypertension Comments 2017 BP has been well-controlled.   Other Core Components - Tobacco   History of  Tobacco Use Never   Other Core Components Summary   Interventions Planned Educate on importance of maintaining low sodium diet   Patient Goals Yes   Goal #1 Description Pt. will increase strength and endurance to be able to garden by attending cardiac rehab 3 days per week.   Goal #1 Target Date 07/18/17   Goal #1 Date Met 06/19/17   Goal #1 Progress Towards Goal 6/19/2017 PT has been gardening and walking without sx. She is attending cardiac rehab about 2 days per week. Goal MET,    Other Core Components Comments 6/19/2017 PT has the information she needs with diet.   Activity/Exercise History   Activity/Exercise Assessment Initial   Activity/Exercise Status prior to event? Participated in an Exercise Program;Was Physically Active   Number of Days Currently participating in Moderate Physical Activity? 2-3   Number of Days Currently performing  Aerobic Exercise (including rehab)? 0   Number of Minutes per Session Currently of Aerobic Exercise (average)? 0   Current Stage of Change (Physical Activity) Preparation   Current Stage of Change (Aerobic Exercise) Preparation   Patient Goals Goal #1;Goal #2   Goal #1 Description Pt. will be able to walk 30 minutes 3-4 days per week with a stable CV response   Goal #1 Target Date 07/18/17   Goal #1 Progress Towards Goal 6/19/2017 PT is walking for up to 20 minutes at a time without problem.   Goal #2 Description Pt. will be able to bike 20 miles.   Goal #2 Target Date 07/18/17   Goal #2 Progress Towards Goal 6/19/2017 PT has not done this yet. She is focusing on walking for now.   Activity/Exercise Comments 6/19/2017 PT does not have any concerns so far with her exercise, but is reluctant to try other modalities. She has started light weights.   Activity/Exercise Target Outcome An Accumulation of 150  Minutes of Aerobic Activity per Week   Exercise Assessment   6 Minute Walk Predicted - Gender Selection Female   6 Minute Walk Predicted (Male) 1278.6   6 Minute Walk Predicted  (Female) 1308.1   Initial 6 Minute Walk Distance (Feet) 1413 ft   Resting HR 82 bpm   Exercise  bpm   Post Exercise HR 82 bpm   Resting /70   Exercise /70   Post Exercise /64   Effort Rating 5   Current MET Level 3.3   MET Level Goal 4.5-5   ECG Rhythm Sinus rhythm   Ectopy PACs   Current Symptoms Denies symptoms   Limitations/Restrictions None   Exercise Prescription   Mode Recumbant bike;Ambulation;Weights   Duration/Time 30-45 min   Frequency 2 days/week   THR (85% of age predicted max HR) 118.15   OMNI Effort Rating (0-10 Scale) 4-6/10   Progression Continuous bouts;Total exercise time of 30-45 minutes;Progress peak intensity by 1/4 MET per week;Aerobic exercise to OMNI rating of 6 or below and at or below THR   Recommended Home Exercise   Type of Exercise Walking   Frequency (days per week) 1-3   Duration (minutes per session) 15-30 min   Effort Rating Recommended 4-6/10   30 Day Exercise Plan Pt. was encouraged to begin an at home walking program starting with 5-10 minutes and gradually increase in duration as tolerated.    Current Home Exercise   Type of Exercise Walking   Frequency (days per week) 2   Duration (minutes per session) 20   Follow-up/On-going Support   Provider follow-up needed on the following No follow-up needed   Learning Assessment   Learner Patient   Primary Language English   Preferred Learning Style Listening;Reading;Demonstration;Pictures/Video   Barriers to Learning No barriers noted   Patient Education   Education recommended Anatomy and Physiology of the Heart;Blood Pressure;Exercise Principles;Medication Overview;Nutrition;Stress Management

## 2017-06-20 ENCOUNTER — HOSPITAL ENCOUNTER (OUTPATIENT)
Dept: CARDIAC REHAB | Facility: CLINIC | Age: 82
End: 2017-06-20
Attending: NURSE PRACTITIONER
Payer: MEDICARE

## 2017-06-20 PROCEDURE — 93798 PHYS/QHP OP CAR RHAB W/ECG: CPT | Performed by: REHABILITATION PRACTITIONER

## 2017-06-20 PROCEDURE — 40000116 ZZH STATISTIC OP CR VISIT: Performed by: REHABILITATION PRACTITIONER

## 2017-06-21 ENCOUNTER — ANTICOAGULATION THERAPY VISIT (OUTPATIENT)
Dept: ANTICOAGULATION | Facility: CLINIC | Age: 82
End: 2017-06-21

## 2017-06-21 DIAGNOSIS — I48.0 PAROXYSMAL ATRIAL FIBRILLATION (H): ICD-10-CM

## 2017-06-21 DIAGNOSIS — Z79.01 LONG-TERM (CURRENT) USE OF ANTICOAGULANTS: ICD-10-CM

## 2017-06-21 LAB — INR PPP: 1.2 (ref 0.86–1.14)

## 2017-06-21 PROCEDURE — 85610 PROTHROMBIN TIME: CPT | Performed by: FAMILY MEDICINE

## 2017-06-21 PROCEDURE — 36416 COLLJ CAPILLARY BLOOD SPEC: CPT | Performed by: FAMILY MEDICINE

## 2017-06-21 NOTE — MR AVS SNAPSHOT
Frannie Roa   6/21/2017   Anticoagulation Therapy Visit    Description:  81 year old female   Provider:  Jennifer Rollins, RN   Department:  [facility]           INR as of 6/21/2017     Today's INR       Anticoagulation Summary as of 6/21/2017     INR goal    Prior goal 2.0-3.0   Today's INR    Full instructions 7.5 mg on Mon, Wed, Fri; 10 mg all other days   Next INR check     Indications   Long-term (current) use of anticoagulants [Z79.01] [Z79.01]  Atrial fibrillation (H) [I48.91] [I48.91]         Anticoagulation Episode Summary     Resolved date 6/21/2017    Resolved reason Changed Anticoagulant

## 2017-06-21 NOTE — PROGRESS NOTES
ANTICOAGULATION FOLLOW-UP CLINIC VISIT    Patient Name:  Frannie Roa  Date:  6/21/2017  Contact Type:  Telephone    SUBJECTIVE:        OBJECTIVE    INR   Date Value Ref Range Status   06/21/2017 1.20 (H) 0.86 - 1.14 Final     Comment:     This test is intended for monitoring Coumadin therapy.  Results are not   accurate   in patients with prolonged INR due to factor deficiency.         ASSESSMENT / PLAN  No question data found.  Anticoagulation Summary as of 6/21/2017     INR goal    Prior goal 2.0-3.0   Today's INR    Maintenance plan 7.5 mg (5 mg x 1.5) on Mon, Wed, Fri; 10 mg (5 mg x 2) all other days   Full instructions 7.5 mg on Mon, Wed, Fri; 10 mg all other days   Weekly total 62.5 mg   Plan last modified Radha Gould RN (6/8/2017)   Next INR check    Target end date     Indications   Long-term (current) use of anticoagulants [Z79.01] [Z79.01]  Atrial fibrillation (H) [I48.91] [I48.91]         Anticoagulation Episode Summary     INR check location     Preferred lab     Resolved date 6/21/2017    Resolved reason Changed Anticoagulant     Send INR reminders to [facility]    Comments As of 3/20/17: +++++ INR goal range +++2.2-2.7++   HIPAA OK to speak with Parminder Alonzo       Anticoagulation Care Providers     Provider Role Specialty Phone number    Asim Altamirano MD [facility] Cardiology [phone]            See the Encounter Report to view Anticoagulation Flowsheet and Dosing Calendar (Go to Encounters tab in chart review, and find the Anticoagulation Therapy Visit)    Spoke with patient. Gave them their lab results and new warfarin recommendation.  No changes in health, medication, or diet. No missed doses, no falls. No signs or symptoms of bleed or clotting.     Pt is being D/C'd from our services due to starting Eliquis.     Jennifer Rollins RN

## 2017-06-22 ENCOUNTER — CARE COORDINATION (OUTPATIENT)
Dept: CARDIOLOGY | Facility: CLINIC | Age: 82
End: 2017-06-22

## 2017-06-22 NOTE — PROGRESS NOTES
Follow up telephone call made to Frannie, and was not able to speak with her, so a message was left:    I had not heard if everything was OK in Frannie getting the Eliquis approved by her pharmacy.    I called to find this out, and if she needed any assistance from me.    I asked her to call the triage nurse line and to leave a message for me regarding this.

## 2017-06-27 ENCOUNTER — HOSPITAL ENCOUNTER (OUTPATIENT)
Dept: CARDIAC REHAB | Facility: CLINIC | Age: 82
End: 2017-06-27
Attending: NURSE PRACTITIONER
Payer: MEDICARE

## 2017-06-27 PROCEDURE — 40000116 ZZH STATISTIC OP CR VISIT: Performed by: OCCUPATIONAL THERAPIST

## 2017-06-27 PROCEDURE — 93798 PHYS/QHP OP CAR RHAB W/ECG: CPT | Performed by: OCCUPATIONAL THERAPIST

## 2017-07-06 ENCOUNTER — HOSPITAL ENCOUNTER (OUTPATIENT)
Dept: CARDIAC REHAB | Facility: CLINIC | Age: 82
End: 2017-07-06
Attending: NURSE PRACTITIONER
Payer: MEDICARE

## 2017-07-06 PROCEDURE — 40000116 ZZH STATISTIC OP CR VISIT

## 2017-07-06 PROCEDURE — 93798 PHYS/QHP OP CAR RHAB W/ECG: CPT

## 2017-07-11 ENCOUNTER — HOSPITAL ENCOUNTER (OUTPATIENT)
Dept: CARDIAC REHAB | Facility: CLINIC | Age: 82
End: 2017-07-11
Attending: NURSE PRACTITIONER
Payer: MEDICARE

## 2017-07-11 PROCEDURE — 93798 PHYS/QHP OP CAR RHAB W/ECG: CPT | Performed by: REHABILITATION PRACTITIONER

## 2017-07-11 PROCEDURE — 40000116 ZZH STATISTIC OP CR VISIT: Performed by: REHABILITATION PRACTITIONER

## 2017-07-13 ENCOUNTER — OFFICE VISIT (OUTPATIENT)
Dept: INTERNAL MEDICINE | Facility: CLINIC | Age: 82
End: 2017-07-13
Payer: MEDICARE

## 2017-07-13 VITALS
HEART RATE: 78 BPM | OXYGEN SATURATION: 98 % | TEMPERATURE: 97.7 F | DIASTOLIC BLOOD PRESSURE: 66 MMHG | WEIGHT: 122.5 LBS | SYSTOLIC BLOOD PRESSURE: 102 MMHG | BODY MASS INDEX: 21.71 KG/M2 | HEIGHT: 63 IN

## 2017-07-13 DIAGNOSIS — H26.9 CATARACT OF RIGHT EYE, UNSPECIFIED CATARACT TYPE: ICD-10-CM

## 2017-07-13 DIAGNOSIS — I47.10 SVT (SUPRAVENTRICULAR TACHYCARDIA) (H): ICD-10-CM

## 2017-07-13 DIAGNOSIS — I50.22 CHRONIC SYSTOLIC CONGESTIVE HEART FAILURE (H): ICD-10-CM

## 2017-07-13 DIAGNOSIS — Z01.818 PREOP GENERAL PHYSICAL EXAM: Primary | ICD-10-CM

## 2017-07-13 DIAGNOSIS — I48.0 PAROXYSMAL ATRIAL FIBRILLATION (H): ICD-10-CM

## 2017-07-13 PROCEDURE — 99215 OFFICE O/P EST HI 40 MIN: CPT | Performed by: INTERNAL MEDICINE

## 2017-07-13 RX ORDER — MOXIFLOXACIN HCL 0.5 %
DROPS OPHTHALMIC (EYE)
COMMUNITY
Start: 2017-06-22

## 2017-07-13 NOTE — MR AVS SNAPSHOT
After Visit Summary   7/13/2017    Frannie Roa    MRN: [id number]           Patient Information     Date Of Birth          1935        Visit Information        Provider Department      7/13/2017 9:40 AM Romelia Parker MD [facility]        Today's Diagnoses     Preop general physical exam    -  1    Cataract of right eye, unspecified cataract type        SVT (supraventricular tachycardia) (H)        Chronic systolic congestive heart failure (H)        Paroxysmal atrial fibrillation (H)          Care Instructions      Before Your Surgery      Call your surgeon if there is any change in your health. This includes signs of a cold or flu (such as a sore throat, runny nose, cough, rash or fever).    Do not smoke, drink alcohol or take over the counter medicine (unless your surgeon or primary care doctor tells you to) for the 24 hours before and after surgery.    If you take prescribed drugs: Follow your doctor s orders about which medicines to take and which to stop until after surgery.    Eating and drinking prior to surgery: follow the instructions from your surgeon    Take a shower or bath the night before surgery. Use the soap your surgeon gave you to gently clean your skin. If you do not have soap from your surgeon, use your regular soap. Do not shave or scrub the surgery site.  Wear clean pajamas and have clean sheets on your bed.           Follow-ups after your visit        Your next 10 appointments already scheduled     Jul 14, 2017  9:00 AM CDT   LAB with [facility] LAB   [facility] ([facility]    [address]  [facility] [address]   [phone]           Patient must bring picture ID.  Patient should be prepared to give a urine specimen  Please do not eat 10-12 hours before your appointment if you are coming in fasting for labs on lipids, cholesterol, or glucose (sugar).  Pregnant women should follow their Care Team  instructions. Water with medications is okay. Do not drink coffee or other fluids.   If you have concerns about taking  your medications, please ask at office or if scheduling via [employer], send a message by clicking on Secure Messaging, Message Your Care Team.            Jul 14, 2017  1:00 PM CDT   Cardiac Treatment with Rh Cardiac Rehab 1   [facility]    [address] [place] [address], [address]  [facility] [address]   [phone]            Jul 14, 2017  2:00 PM CDT   CONSULT with Rh Cardiac Rehab 1   [facility]    [address] [place] [address], [address]  [facility] [address]   [phone]            Jul 17, 2017  1:00 PM CDT   Cardiac Treatment with Rh Cardiac Rehab 1   [facility]    [address] [place] [address], [address]  [facility] [address]   [phone]            Jul 18, 2017  1:00 PM CDT   Cardiac Treatment with Rh Cardiac Rehab 1   [facility]    [address] [place] [address], [address]  [facility] [address]   [phone]            Jul 21, 2017  1:00 PM CDT   Cardiac Treatment with Rh Cardiac Rehab 1   [facility]    [address], [address]  [facility] [address]   [phone]            Jul 24, 2017  1:00 PM CDT   Cardiac Treatment with Rh Cardiac Rehab [address] [place] [address] ([facility])    [address] [place] [address], [address]  [facility] [address]   [phone]            Jul 24, 2017   Procedure with Dennis Benz MD   [facility] (--)    [address]  [facility] [address]   [phone]            Jul 24, 2017   Procedure with Dennis Benz MD   [facility] (--)    [address]  [facility] [address]   [phone]            Aug 22, 2017  8:00 AM CDT   (Arrive by 7:45 AM)   RETURN  "HEART FAILURE with Asim Altamirano MD   [facility] ([facility])    [address]  [address] [facility] [address] [phone]              Future tests that were ordered for you today     Open Future Orders        Priority Expected Expires Ordered    Lipid panel reflex to direct LDL Routine  2018    Comprehensive metabolic panel (BMP + Alb, Alk Phos, ALT, AST, Total. Bili, TP) Routine  2018            Who to contact     If you have questions or need follow up information about today's clinic visit or your schedule please contact [facility] directly at [phone].  Normal or non-critical lab and imaging results will be communicated to you by MyChart, letter or phone within 4 business days after the clinic has received the results. If you do not hear from us within 7 days, please contact the clinic through [employer] or phone. If you have a critical or abnormal lab result, we will notify you by phone as soon as possible.  Submit refill requests through [employer] or call your pharmacy and they will forward the refill request to us. Please allow 3 business days for your refill to be completed.          Additional Information About Your Visit        [employer] Information     [employer] lets you send messages to your doctor, view your test results, renew your prescriptions, schedule appointments and more. To sign up, go to [email or web address] . Click on \"Log in\" on the left side of the screen, which will take you to the Welcome page. Then click on \"Sign up Now\" on the right side of the page.     You will be asked to enter the access code listed below, as well as some personal information. Please follow the directions to create your username and password.     Your access code is: [email or web address]  Expires: 2017  2:38 PM     Your access code will  in 90 days. If you need help or a new code, please call your [facility] " "clinic or [phone].        Care EveryWhere ID     This is your Care EveryWhere ID. This could be used by other organizations to access your [facility] medical records  [id number]        Your Vitals Were     Pulse Temperature Height Pulse Oximetry BMI (Body Mass Index)       78 97.7  F (36.5  C) (Oral) 5' 2.5\" (1.588 m) 98% 22.05 kg/m2        Blood Pressure from Last 3 Encounters:   07/13/17 102/66   06/14/17 108/58   06/08/17 108/71    Weight from Last 3 Encounters:   07/13/17 122 lb 8 oz (55.6 kg)   06/19/17 132 lb 3.2 oz (60 kg)   06/14/17 123 lb (55.8 kg)               Primary Care Provider Office Phone # Fax #    Romelia Parker [phone]       [facility] [address]  [facility] [address]        Equal Access to Services     MYRNA PARRA : Hadii aad ku hadasho Soomaali, waaxda luqadaha, qaybta kaalmada adeegyada, waxay idiin hayaan adeeg kharamic campoverde . So [facility] [phone].    ATENCIÓN: Si habla español, tiene a thomas disposición servicios gratuitos de asistencia lingüística. Esvin al [phone].    We comply with applicable federal civil rights laws and [place] laws. We do not discriminate on the basis of race, color, national origin, age, disability sex, sexual orientation or gender identity.            Thank you!     Thank you for choosing [facility]  for your care. Our goal is always to provide you with excellent care. Hearing back from our patients is one way we can continue to improve our services. Please take a few minutes to complete the written survey that you may receive in the mail after your visit with us. Thank you!             Your Updated Medication List - Protect others around you: Learn how to safely use, store and throw away your medicines at www.disposemymeds.org.          This list is accurate as of: 7/13/17 11:27 AM.  Always use your most recent med list.                   Brand Name Dispense Instructions for use " Diagnosis    apixaban ANTICOAGULANT 2.5 MG tablet    ELIQUIS    180 tablet    Take 1 tablet (2.5 mg) by mouth 2 times daily        ASPIRIN PO      Take 81 mg by mouth daily        COUMADIN PO           dorzolamide-timolol 2-0.5 % ophthalmic solution    COSOPT     Place 1 drop Into the left eye 2 times daily        metoprolol 25 MG 24 hr tablet    TOPROL-XL    45 tablet    Take 0.5 tablets (12.5 mg) by mouth daily    Mitral valve insufficiency, unspecified etiology       travoprost Z (benzalkonium) 0.004 % ophthalmic solution    TRAVATAN Z     Place 1 drop into both eyes every evening        VIGAMOX 0.5 % ophthalmic solution   Generic drug:  moxifloxacin           zinc sulfate 220 (50 ZN) MG capsule    ZINCATE     Take 220 mg by mouth 2 times daily

## 2017-07-13 NOTE — PROGRESS NOTES
[facility]  [address]  [facility] [address]  [phone]  Dept: [phone]    PRE-OP EVALUATION:  Today's date: 2017    Frannie Roa (: 1935) presents for pre-operative evaluation assessment as requested by Dr. Benz.  She requires evaluation and anesthesia risk assessment prior to undergoing surgery/procedure for treatment of Rt Cataract  .  Proposed procedure: RIGHT EYE FEMTO ASSISTED PHACOEMULSIFICATION CLEAR CORNEA WITH STANDARD INTRAOCULAR LENS IMPLANT (TOPICAL)    Date of Surgery/ Procedure: 2017  Time of Surgery/ Procedure: 2:00pm  Hospital/Surgical Facility: [facility]  Primary Physician: Romelia Parker  Type of Anesthesia Anticipated: Combined MAC with Topical    Patient has a Health Care Directive or Living Will:  YES on file    1. Yes - Do you have a history of heart attack, stroke, stent, bypass or surgery on an artery in the head, neck, heart or legs?  2. NO - Do you ever have any pain or discomfort in your chest?  3. NO - Do you have a history of  Heart Failure?  4. NO - Are you troubled by shortness of breath when: walking on the level, up a slight hill or at night?  5. NO - Do you currently have a cold, bronchitis or other respiratory infection?  6. NO - Do you have a cough, shortness of breath or wheezing?  7. NO - Do you sometimes get pains in the calves of your legs when you walk?  8. NO - Do you or anyone in your family have previous history of blood clots?  9. NO - Do you or does anyone in your family have a serious bleeding problem such as prolonged bleeding following surgeries or cuts?  10. NO - Have you ever had problems with anemia or been told to take iron pills?  11. NO - Have you had any abnormal blood loss such as black, tarry or bloody stools, or abnormal vaginal bleeding?  12. NO - Have you ever had a blood transfusion?  13. NO - Have you or any of your relatives ever had problems with anesthesia?  14. NO - Do you  have sleep apnea, excessive snoring or daytime drowsiness?  15. NO - Do you have any prosthetic heart valves?  16. NO - Do you have prosthetic joints?  17. NO - Is there any chance that you may be pregnant?      HPI:                                                      Brief HPI related to upcoming procedure: decreasing vision over the past year increasing problems to read. Going in for right cataract extraction.      See problem list for active medical problems.  Problems all longstanding and stable, except as noted/documented.  See ROS for pertinent symptoms related to these conditions.                                                                                                  .    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          Past Medical History:   Diagnosis Date     Arthritis     osteoarthritis     Atrial fibrillation (H)      Bronchitis 02/2017     Glaucoma      Hyperlipidemia 2/1/2015     Myxomatous mitral valve regurgitation 4/22/2014     SVT (supraventricular tachycardia) (H) 8/25/2014     Systolic murmur 4/22/2014     Past  Surgical History:   Procedure Laterality Date     ANGIOGRAM  04/23/2017     BIOPSY      breast     ENT SURGERY      T & A     EYE SURGERY      Laser Trabelectomy for glaucoma     ORTHOPEDIC SURGERY      Right ankle cyst removal requiring ORIF     PERCUTANEOUS MITRAL VALVE REPAIR N/A 5/8/2017    Procedure: PERCUTANEOUS MITRAL VALVE REPAIR ANESTHESIA;  Percutaneous Mitral Valve Repair (Mitraclip);  Surgeon: Shailesh Lay MD;  Location: [facility] OR     PHACOEMULSIFICATION CLEAR CORNEA W/ STANDARD IOL IMPLANT, ENDOSCOPIC CYCLOPHOTOCOAGULATION, COMBINED  11/28/2011    Procedure:COMBINED PHACOEMULSIFICATION CLEAR CORNEA WITH STANDARD INTRAOCULAR LENS IMPLANT, ENDOSCOPIC CYCLOPHOTOCOAGULATION; LEFT EYE PHACOEMULSIFICATION CLEAR CORNEA WITH STANDARD INTRAOCULAR LENS IMPLANT, ENDOSCOPIC CYCLOPHOTOCOAGULATION ; Surgeon:ELOY WARE; Location:[facility]     Current Outpatient Prescriptions   Medication Sig Dispense Refill     Warfarin Sodium (COUMADIN PO)        apixaban ANTICOAGULANT (ELIQUIS) 2.5 MG tablet Take 1 tablet (2.5 mg) by mouth 2 times daily 180 tablet 3     metoprolol (TOPROL-XL) 25 MG 24 hr tablet Take 0.5 tablets (12.5 mg) by mouth daily 45 tablet 3     ASPIRIN PO Take 81 mg by mouth daily       zinc sulfate (ZINCATE) 220 MG capsule Take 220 mg by mouth 2 times daily       dorzolamide-timolol (COSOPT) 2-0.5 % ophthalmic solution Place 1 drop Into the left eye 2 times daily        travoprost Z, benzalkonium, (TRAVATAN Z) 0.004 % ophthalmic solution Place 1 drop into both eyes every evening        VIGAMOX 0.5 % ophthalmic solution        OTC products: None, except as noted above    Allergies   Allergen Reactions     Penicillins      Sulfa Drugs       Latex Allergy: NO    Social History   Substance Use Topics     Smoking status: Never Smoker     Smokeless tobacco: Never Used     Alcohol use 0.0 oz/week     0 Standard drinks or equivalent per week      Comment: 3 glasses wine/week     History   Drug Use No  "      REVIEW OF SYSTEMS:                                                    C: NEGATIVE for fever, chills, change in weight  I: NEGATIVE for worrisome rashes, moles or lesions  E: NEGATIVE for vision changes or irritation  E/M: NEGATIVE for ear, mouth and throat problems  R: NEGATIVE for significant cough or SOB  B: NEGATIVE for masses, tenderness or discharge  CV: NEGATIVE for chest pain, palpitations or peripheral edema  GI: NEGATIVE for nausea, abdominal pain, heartburn, or change in bowel habits  : NEGATIVE for frequency, dysuria, or hematuria  M: NEGATIVE for significant arthralgias or myalgia  N: NEGATIVE for weakness, dizziness or paresthesias  E: NEGATIVE for temperature intolerance, skin/hair changes  H: NEGATIVE for bleeding problems  P: NEGATIVE for changes in mood or affect    EXAM:                                                    /66 (BP Location: Right arm, Patient Position: Sitting, Cuff Size: Adult Regular)  Pulse 78  Temp 97.7  F (36.5  C) (Oral)  Ht 5' 2.5\" (1.588 m)  Wt 122 lb 8 oz (55.6 kg)  SpO2 98%  BMI 22.05 kg/m2    GENERAL APPEARANCE: healthy, alert and no distress     EYES: EOMI, PERRL     HENT: ear canals and TM's normal and nose and mouth without ulcers or lesions     NECK: no adenopathy, no asymmetry, masses, or scars and thyroid normal to palpation     RESP: lungs clear to auscultation - no rales, rhonchi or wheezes     CV: regular rates and rhythm, normal S1 S2, no S3 or S4 and no murmur, click or rub     ABDOMEN:  soft, nontender, no HSM or masses and bowel sounds normal     MS: extremities normal- no gross deformities noted, no evidence of inflammation in joints, FROM in all extremities.     SKIN: no suspicious lesions or rashes     NEURO: Normal strength and tone, sensory exam grossly normal, mentation intact and speech normal     PSYCH: mentation appears normal. and affect normal/bright     LYMPHATICS: No axillary, cervical, or supraclavicular nodes    DIAGNOSTICS:  "                                                     Labs Drawn and in Process:   Unresulted Labs Ordered in the Past 30 Days of this Admission     No orders found from 5/14/2017 to 7/14/2017.          Recent Labs   Lab Test  06/21/17   1356  06/19/17   1426   06/08/17   0900   05/09/17 2025   HGB   --    --    --   12.8   --   11.8   PLT   --    --    --   266   --   246   INR  1.20*  2.30*   < >  1.30*   < >  1.07   NA   --    --    --   139   --   140   POTASSIUM   --    --    --   4.1   --   3.9   CR   --    --    --   0.66   --   0.64    < > = values in this interval not displayed.        IMPRESSION:                                                    Reason for surgery/procedure: cataract  Diagnosis/reason for consult: CHF, PSVT    The proposed surgical procedure is considered LOW risk.    REVISED CARDIAC RISK INDEX  The patient has the following serious cardiovascular risks for perioperative complications such as (MI, PE, VFib and 3  AV Block):  Congestive Heart Failure   INTERPRETATION: 2 risks: Class III (moderate risk - 6.6% complication rate)    The patient has the following additional risks for perioperative complications:  No identified additional risks      ICD-10-CM    1. Preop general physical exam Z01.818 Comprehensive metabolic panel (BMP + Alb, Alk Phos, ALT, AST, Total. Bili, TP)     CANCELED: Comprehensive metabolic panel (BMP + Alb, Alk Phos, ALT, AST, Total. Bili, TP)   2. Cataract of right eye, unspecified cataract type H26.9 Comprehensive metabolic panel (BMP + Alb, Alk Phos, ALT, AST, Total. Bili, TP)     CANCELED: Comprehensive metabolic panel (BMP + Alb, Alk Phos, ALT, AST, Total. Bili, TP)   3. SVT (supraventricular tachycardia) (H) I47.1    4. Chronic systolic congestive heart failure (H) I50.22 Lipid panel reflex to direct LDL     CANCELED: Lipid panel reflex to direct LDL   5. Paroxysmal atrial fibrillation (H) I48.0        RECOMMENDATIONS:                                                           --Patient is to take all scheduled medications on the day of surgery EXCEPT for modifications listed below.    APPROVAL GIVEN to proceed with proposed procedure, without further diagnostic evaluation       Signed Electronically by: Romelia Parker MD    Copy of this evaluation report is provided to requesting physician.    [facility] Preop Guidelines

## 2017-07-14 ENCOUNTER — HOSPITAL ENCOUNTER (OUTPATIENT)
Dept: CARDIAC REHAB | Facility: CLINIC | Age: 82
End: 2017-07-14
Attending: NURSE PRACTITIONER
Payer: MEDICARE

## 2017-07-14 VITALS — WEIGHT: 123.4 LBS | BODY MASS INDEX: 21.86 KG/M2 | HEIGHT: 63 IN

## 2017-07-14 DIAGNOSIS — Z01.818 PREOP GENERAL PHYSICAL EXAM: ICD-10-CM

## 2017-07-14 DIAGNOSIS — H26.9 CATARACT OF RIGHT EYE, UNSPECIFIED CATARACT TYPE: ICD-10-CM

## 2017-07-14 DIAGNOSIS — I50.22 CHRONIC SYSTOLIC CONGESTIVE HEART FAILURE (H): ICD-10-CM

## 2017-07-14 LAB
ALBUMIN SERPL-MCNC: 3.7 G/DL (ref 3.4–5)
ALP SERPL-CCNC: 79 U/L (ref 40–150)
ALT SERPL W P-5'-P-CCNC: 32 U/L (ref 0–50)
ANION GAP SERPL CALCULATED.3IONS-SCNC: 7 MMOL/L (ref 3–14)
AST SERPL W P-5'-P-CCNC: 33 U/L (ref 0–45)
BILIRUB SERPL-MCNC: 0.5 MG/DL (ref 0.2–1.3)
BUN SERPL-MCNC: 15 MG/DL (ref 7–30)
CALCIUM SERPL-MCNC: 8.7 MG/DL (ref 8.5–10.1)
CHLORIDE SERPL-SCNC: 105 MMOL/L (ref 94–109)
CHOLEST SERPL-MCNC: 253 MG/DL
CO2 SERPL-SCNC: 29 MMOL/L (ref 20–32)
CREAT SERPL-MCNC: 0.69 MG/DL (ref 0.52–1.04)
GFR SERPL CREATININE-BSD FRML MDRD: 81 ML/MIN/1.7M2
GLUCOSE SERPL-MCNC: 100 MG/DL (ref 70–99)
HDLC SERPL-MCNC: 88 MG/DL
LDLC SERPL CALC-MCNC: 154 MG/DL
NONHDLC SERPL-MCNC: 165 MG/DL
POTASSIUM SERPL-SCNC: 4.4 MMOL/L (ref 3.4–5.3)
PROT SERPL-MCNC: 7 G/DL (ref 6.8–8.8)
SODIUM SERPL-SCNC: 141 MMOL/L (ref 133–144)
TRIGL SERPL-MCNC: 54 MG/DL

## 2017-07-14 PROCEDURE — 93797 PHYS/QHP OP CAR RHAB WO ECG: CPT | Performed by: REHABILITATION PRACTITIONER

## 2017-07-14 PROCEDURE — 80061 LIPID PANEL: CPT | Performed by: INTERNAL MEDICINE

## 2017-07-14 PROCEDURE — 40000575 ZZH STATISTIC OP CARDIAC VISIT #2: Performed by: REHABILITATION PRACTITIONER

## 2017-07-14 PROCEDURE — 80053 COMPREHEN METABOLIC PANEL: CPT | Performed by: INTERNAL MEDICINE

## 2017-07-14 PROCEDURE — 36415 COLL VENOUS BLD VENIPUNCTURE: CPT | Performed by: INTERNAL MEDICINE

## 2017-07-14 PROCEDURE — 93798 PHYS/QHP OP CAR RHAB W/ECG: CPT | Performed by: OCCUPATIONAL THERAPIST

## 2017-07-14 PROCEDURE — 40000116 ZZH STATISTIC OP CR VISIT: Performed by: OCCUPATIONAL THERAPIST

## 2017-07-14 ASSESSMENT — 6 MINUTE WALK TEST (6MWT)
MALE CALC: 1271.93
FEMALE CALC: 1329.85
TOTAL DISTANCE WALKED (FT): 1413
PREDICTED: 1279.68
GENDER SELECTION: FEMALE
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
## 2017-07-17 ENCOUNTER — HOSPITAL ENCOUNTER (OUTPATIENT)
Dept: CARDIAC REHAB | Facility: CLINIC | Age: 82
End: 2017-07-17
Attending: NURSE PRACTITIONER
Payer: MEDICARE

## 2017-07-17 PROCEDURE — 40000116 ZZH STATISTIC OP CR VISIT: Performed by: REHABILITATION PRACTITIONER

## 2017-07-17 PROCEDURE — 93798 PHYS/QHP OP CAR RHAB W/ECG: CPT | Performed by: REHABILITATION PRACTITIONER

## 2017-07-18 ENCOUNTER — HOSPITAL ENCOUNTER (OUTPATIENT)
Dept: CARDIAC REHAB | Facility: CLINIC | Age: 82
End: 2017-07-18
Attending: NURSE PRACTITIONER
Payer: MEDICARE

## 2017-07-18 PROCEDURE — 40000116 ZZH STATISTIC OP CR VISIT: Performed by: REHABILITATION PRACTITIONER

## 2017-07-18 PROCEDURE — 93798 PHYS/QHP OP CAR RHAB W/ECG: CPT | Performed by: REHABILITATION PRACTITIONER

## 2017-07-21 NOTE — H&P (VIEW-ONLY)
[facility]  [address]  [facility] [address]  [phone]  Dept: [phone]    PRE-OP EVALUATION:  Today's date: 2017    Frannie Roa (: 1935) presents for pre-operative evaluation assessment as requested by Dr. Benz.  She requires evaluation and anesthesia risk assessment prior to undergoing surgery/procedure for treatment of Rt Cataract  .  Proposed procedure: RIGHT EYE FEMTO ASSISTED PHACOEMULSIFICATION CLEAR CORNEA WITH STANDARD INTRAOCULAR LENS IMPLANT (TOPICAL)    Date of Surgery/ Procedure: 2017  Time of Surgery/ Procedure: 2:00pm  Hospital/Surgical Facility: [facility]  Primary Physician: Romelia Parker  Type of Anesthesia Anticipated: Combined MAC with Topical    Patient has a Health Care Directive or Living Will:  YES on file    1. Yes - Do you have a history of heart attack, stroke, stent, bypass or surgery on an artery in the head, neck, heart or legs?  2. NO - Do you ever have any pain or discomfort in your chest?  3. NO - Do you have a history of  Heart Failure?  4. NO - Are you troubled by shortness of breath when: walking on the level, up a slight hill or at night?  5. NO - Do you currently have a cold, bronchitis or other respiratory infection?  6. NO - Do you have a cough, shortness of breath or wheezing?  7. NO - Do you sometimes get pains in the calves of your legs when you walk?  8. NO - Do you or anyone in your family have previous history of blood clots?  9. NO - Do you or does anyone in your family have a serious bleeding problem such as prolonged bleeding following surgeries or cuts?  10. NO - Have you ever had problems with anemia or been told to take iron pills?  11. NO - Have you had any abnormal blood loss such as black, tarry or bloody stools, or abnormal vaginal bleeding?  12. NO - Have you ever had a blood transfusion?  13. NO - Have you or any of your relatives ever had problems with anesthesia?  14. NO - Do you  have sleep apnea, excessive snoring or daytime drowsiness?  15. NO - Do you have any prosthetic heart valves?  16. NO - Do you have prosthetic joints?  17. NO - Is there any chance that you may be pregnant?      HPI:                                                      Brief HPI related to upcoming procedure: decreasing vision over the past year increasing problems to read. Going in for right cataract extraction.      See problem list for active medical problems.  Problems all longstanding and stable, except as noted/documented.  See ROS for pertinent symptoms related to these conditions.                                                                                                  .    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          Past Medical History:   Diagnosis Date     Arthritis     osteoarthritis     Atrial fibrillation (H)      Bronchitis 02/2017     Glaucoma      Hyperlipidemia 2/1/2015     Myxomatous mitral valve regurgitation 4/22/2014     SVT (supraventricular tachycardia) (H) 8/25/2014     Systolic murmur 4/22/2014     Past  Surgical History:   Procedure Laterality Date     ANGIOGRAM  04/23/2017     BIOPSY      breast     ENT SURGERY      T & A     EYE SURGERY      Laser Trabelectomy for glaucoma     ORTHOPEDIC SURGERY      Right ankle cyst removal requiring ORIF     PERCUTANEOUS MITRAL VALVE REPAIR N/A 5/8/2017    Procedure: PERCUTANEOUS MITRAL VALVE REPAIR ANESTHESIA;  Percutaneous Mitral Valve Repair (Mitraclip);  Surgeon: Shailesh Lay MD;  Location: [facility] OR     PHACOEMULSIFICATION CLEAR CORNEA W/ STANDARD IOL IMPLANT, ENDOSCOPIC CYCLOPHOTOCOAGULATION, COMBINED  11/28/2011    Procedure:COMBINED PHACOEMULSIFICATION CLEAR CORNEA WITH STANDARD INTRAOCULAR LENS IMPLANT, ENDOSCOPIC CYCLOPHOTOCOAGULATION; LEFT EYE PHACOEMULSIFICATION CLEAR CORNEA WITH STANDARD INTRAOCULAR LENS IMPLANT, ENDOSCOPIC CYCLOPHOTOCOAGULATION ; Surgeon:ELOY WARE; Location:[facility]     Current Outpatient Prescriptions   Medication Sig Dispense Refill     Warfarin Sodium (COUMADIN PO)        apixaban ANTICOAGULANT (ELIQUIS) 2.5 MG tablet Take 1 tablet (2.5 mg) by mouth 2 times daily 180 tablet 3     metoprolol (TOPROL-XL) 25 MG 24 hr tablet Take 0.5 tablets (12.5 mg) by mouth daily 45 tablet 3     ASPIRIN PO Take 81 mg by mouth daily       zinc sulfate (ZINCATE) 220 MG capsule Take 220 mg by mouth 2 times daily       dorzolamide-timolol (COSOPT) 2-0.5 % ophthalmic solution Place 1 drop Into the left eye 2 times daily        travoprost Z, benzalkonium, (TRAVATAN Z) 0.004 % ophthalmic solution Place 1 drop into both eyes every evening        VIGAMOX 0.5 % ophthalmic solution        OTC products: None, except as noted above    Allergies   Allergen Reactions     Penicillins      Sulfa Drugs       Latex Allergy: NO    Social History   Substance Use Topics     Smoking status: Never Smoker     Smokeless tobacco: Never Used     Alcohol use 0.0 oz/week     0 Standard drinks or equivalent per week      Comment: 3 glasses wine/week     History   Drug Use No  "      REVIEW OF SYSTEMS:                                                    C: NEGATIVE for fever, chills, change in weight  I: NEGATIVE for worrisome rashes, moles or lesions  E: NEGATIVE for vision changes or irritation  E/M: NEGATIVE for ear, mouth and throat problems  R: NEGATIVE for significant cough or SOB  B: NEGATIVE for masses, tenderness or discharge  CV: NEGATIVE for chest pain, palpitations or peripheral edema  GI: NEGATIVE for nausea, abdominal pain, heartburn, or change in bowel habits  : NEGATIVE for frequency, dysuria, or hematuria  M: NEGATIVE for significant arthralgias or myalgia  N: NEGATIVE for weakness, dizziness or paresthesias  E: NEGATIVE for temperature intolerance, skin/hair changes  H: NEGATIVE for bleeding problems  P: NEGATIVE for changes in mood or affect    EXAM:                                                    /66 (BP Location: Right arm, Patient Position: Sitting, Cuff Size: Adult Regular)  Pulse 78  Temp 97.7  F (36.5  C) (Oral)  Ht 5' 2.5\" (1.588 m)  Wt 122 lb 8 oz (55.6 kg)  SpO2 98%  BMI 22.05 kg/m2    GENERAL APPEARANCE: healthy, alert and no distress     EYES: EOMI, PERRL     HENT: ear canals and TM's normal and nose and mouth without ulcers or lesions     NECK: no adenopathy, no asymmetry, masses, or scars and thyroid normal to palpation     RESP: lungs clear to auscultation - no rales, rhonchi or wheezes     CV: regular rates and rhythm, normal S1 S2, no S3 or S4 and no murmur, click or rub     ABDOMEN:  soft, nontender, no HSM or masses and bowel sounds normal     MS: extremities normal- no gross deformities noted, no evidence of inflammation in joints, FROM in all extremities.     SKIN: no suspicious lesions or rashes     NEURO: Normal strength and tone, sensory exam grossly normal, mentation intact and speech normal     PSYCH: mentation appears normal. and affect normal/bright     LYMPHATICS: No axillary, cervical, or supraclavicular nodes    DIAGNOSTICS:  "                                                     Labs Drawn and in Process:   Unresulted Labs Ordered in the Past 30 Days of this Admission     No orders found from 5/14/2017 to 7/14/2017.          Recent Labs   Lab Test  06/21/17   1356  06/19/17   1426   06/08/17   0900   05/09/17 2025   HGB   --    --    --   12.8   --   11.8   PLT   --    --    --   266   --   246   INR  1.20*  2.30*   < >  1.30*   < >  1.07   NA   --    --    --   139   --   140   POTASSIUM   --    --    --   4.1   --   3.9   CR   --    --    --   0.66   --   0.64    < > = values in this interval not displayed.        IMPRESSION:                                                    Reason for surgery/procedure: cataract  Diagnosis/reason for consult: CHF, PSVT    The proposed surgical procedure is considered LOW risk.    REVISED CARDIAC RISK INDEX  The patient has the following serious cardiovascular risks for perioperative complications such as (MI, PE, VFib and 3  AV Block):  Congestive Heart Failure   INTERPRETATION: 2 risks: Class III (moderate risk - 6.6% complication rate)    The patient has the following additional risks for perioperative complications:  No identified additional risks      ICD-10-CM    1. Preop general physical exam Z01.818 Comprehensive metabolic panel (BMP + Alb, Alk Phos, ALT, AST, Total. Bili, TP)     CANCELED: Comprehensive metabolic panel (BMP + Alb, Alk Phos, ALT, AST, Total. Bili, TP)   2. Cataract of right eye, unspecified cataract type H26.9 Comprehensive metabolic panel (BMP + Alb, Alk Phos, ALT, AST, Total. Bili, TP)     CANCELED: Comprehensive metabolic panel (BMP + Alb, Alk Phos, ALT, AST, Total. Bili, TP)   3. SVT (supraventricular tachycardia) (H) I47.1    4. Chronic systolic congestive heart failure (H) I50.22 Lipid panel reflex to direct LDL     CANCELED: Lipid panel reflex to direct LDL   5. Paroxysmal atrial fibrillation (H) I48.0        RECOMMENDATIONS:                                                           --Patient is to take all scheduled medications on the day of surgery EXCEPT for modifications listed below.    APPROVAL GIVEN to proceed with proposed procedure, without further diagnostic evaluation       Signed Electronically by: Romelia Parker MD    Copy of this evaluation report is provided to requesting physician.    [facility] Preop Guidelines

## 2017-07-24 ENCOUNTER — ANESTHESIA EVENT (OUTPATIENT)
Dept: SURGERY | Facility: CLINIC | Age: 82
End: 2017-07-24
Payer: MEDICARE

## 2017-07-24 ENCOUNTER — HOSPITAL ENCOUNTER (OUTPATIENT)
Facility: CLINIC | Age: 82
Discharge: HOME | End: 2017-07-24
Attending: OPHTHALMOLOGY | Admitting: OPHTHALMOLOGY
Payer: MEDICARE

## 2017-07-24 ENCOUNTER — APPOINTMENT (OUTPATIENT)
Dept: ADMISSION | Facility: CLINIC | Age: 82
End: 2017-07-24
Attending: OPHTHALMOLOGY
Payer: COMMERCIAL

## 2017-07-24 ENCOUNTER — ANESTHESIA (OUTPATIENT)
Dept: SURGERY | Facility: CLINIC | Age: 82
End: 2017-07-24
Payer: MEDICARE

## 2017-07-24 VITALS
TEMPERATURE: 97.2 F | DIASTOLIC BLOOD PRESSURE: 59 MMHG | SYSTOLIC BLOOD PRESSURE: 119 MMHG | RESPIRATION RATE: 16 BRPM | OXYGEN SATURATION: 98 %

## 2017-07-24 DIAGNOSIS — H25.011 CORTICAL AGE-RELATED CATARACT OF RIGHT EYE: Primary | ICD-10-CM

## 2017-07-24 PROCEDURE — 36000135 ZZH KERATOTOMY ARCUATE W FEMTOSECOND LASER/IMAGING FOR ATIOL: Performed by: OPHTHALMOLOGY

## 2017-07-24 PROCEDURE — 25000128 H RX IP 250 OP 636: Performed by: OPHTHALMOLOGY

## 2017-07-24 PROCEDURE — 25000125 ZZHC RX 250: Performed by: NURSE ANESTHETIST, CERTIFIED REGISTERED

## 2017-07-24 PROCEDURE — 71000028 ZZH EYE RECOVERY PHASE 2 EACH 15 MINS: Performed by: OPHTHALMOLOGY

## 2017-07-24 PROCEDURE — 25000128 H RX IP 250 OP 636: Performed by: NURSE ANESTHETIST, CERTIFIED REGISTERED

## 2017-07-24 PROCEDURE — 27210794 ZZH OR GENERAL SUPPLY STERILE: Performed by: OPHTHALMOLOGY

## 2017-07-24 PROCEDURE — 37000008 ZZH ANESTHESIA TECHNICAL FEE, 1ST 30 MIN: Performed by: OPHTHALMOLOGY

## 2017-07-24 PROCEDURE — 40000170 ZZH STATISTIC PRE-PROCEDURE ASSESSMENT II: Performed by: OPHTHALMOLOGY

## 2017-07-24 PROCEDURE — V2632 POST CHMBR INTRAOCULAR LENS: HCPCS | Performed by: OPHTHALMOLOGY

## 2017-07-24 PROCEDURE — 36000101 ZZH EYE SURGERY LEVEL 3 1ST 30 MIN: Performed by: OPHTHALMOLOGY

## 2017-07-24 PROCEDURE — 25000125 ZZHC RX 250: Performed by: OPHTHALMOLOGY

## 2017-07-24 PROCEDURE — 25000125 ZZHC RX 250: Performed by: ANESTHESIOLOGY

## 2017-07-24 PROCEDURE — 25000128 H RX IP 250 OP 636: Performed by: ANESTHESIOLOGY

## 2017-07-24 DEVICE — IMPLANTABLE DEVICE: Type: IMPLANTABLE DEVICE | Site: EYE | Status: FUNCTIONAL

## 2017-07-24 RX ORDER — BALANCED SALT SOLUTION 6.4; .75; .48; .3; 3.9; 1.7 MG/ML; MG/ML; MG/ML; MG/ML; MG/ML; MG/ML
SOLUTION OPHTHALMIC PRN
Status: DISCONTINUED | OUTPATIENT
Start: 2017-07-24 | End: 2017-07-24 | Stop reason: HOSPADM

## 2017-07-24 RX ORDER — TROPICAMIDE 10 MG/ML
1 SOLUTION/ DROPS OPHTHALMIC
Status: COMPLETED | OUTPATIENT
Start: 2017-07-24 | End: 2017-07-24

## 2017-07-24 RX ORDER — SODIUM CHLORIDE, SODIUM LACTATE, POTASSIUM CHLORIDE, CALCIUM CHLORIDE 600; 310; 30; 20 MG/100ML; MG/100ML; MG/100ML; MG/100ML
500 INJECTION, SOLUTION INTRAVENOUS CONTINUOUS
Status: DISCONTINUED | OUTPATIENT
Start: 2017-07-24 | End: 2017-07-24 | Stop reason: HOSPADM

## 2017-07-24 RX ORDER — PROPARACAINE HYDROCHLORIDE 5 MG/ML
1 SOLUTION/ DROPS OPHTHALMIC ONCE
Status: COMPLETED | OUTPATIENT
Start: 2017-07-24 | End: 2017-07-24

## 2017-07-24 RX ORDER — MOXIFLOXACIN 5 MG/ML
1 SOLUTION/ DROPS OPHTHALMIC
Status: COMPLETED | OUTPATIENT
Start: 2017-07-24 | End: 2017-07-24

## 2017-07-24 RX ORDER — DICLOFENAC SODIUM 1 MG/ML
1 SOLUTION/ DROPS OPHTHALMIC
Status: COMPLETED | OUTPATIENT
Start: 2017-07-24 | End: 2017-07-24

## 2017-07-24 RX ORDER — ONDANSETRON 2 MG/ML
INJECTION INTRAMUSCULAR; INTRAVENOUS PRN
Status: DISCONTINUED | OUTPATIENT
Start: 2017-07-24 | End: 2017-07-24

## 2017-07-24 RX ORDER — PROPOFOL 10 MG/ML
INJECTION, EMULSION INTRAVENOUS PRN
Status: DISCONTINUED | OUTPATIENT
Start: 2017-07-24 | End: 2017-07-24

## 2017-07-24 RX ORDER — PHENYLEPHRINE HYDROCHLORIDE 25 MG/ML
1 SOLUTION/ DROPS OPHTHALMIC
Status: COMPLETED | OUTPATIENT
Start: 2017-07-24 | End: 2017-07-24

## 2017-07-24 RX ORDER — PROPARACAINE HYDROCHLORIDE 5 MG/ML
SOLUTION/ DROPS OPHTHALMIC PRN
Status: DISCONTINUED | OUTPATIENT
Start: 2017-07-24 | End: 2017-07-24 | Stop reason: HOSPADM

## 2017-07-24 RX ORDER — LIDOCAINE 40 MG/G
CREAM TOPICAL
Status: DISCONTINUED | OUTPATIENT
Start: 2017-07-24 | End: 2017-07-24 | Stop reason: HOSPADM

## 2017-07-24 RX ORDER — LIDOCAINE HYDROCHLORIDE 10 MG/ML
INJECTION, SOLUTION EPIDURAL; INFILTRATION; INTRACAUDAL; PERINEURAL PRN
Status: DISCONTINUED | OUTPATIENT
Start: 2017-07-24 | End: 2017-07-24 | Stop reason: HOSPADM

## 2017-07-24 RX ADMIN — PROPOFOL 10 MG: 10 INJECTION, EMULSION INTRAVENOUS at 14:45

## 2017-07-24 RX ADMIN — SODIUM CHLORIDE, SODIUM LACTATE, POTASSIUM CHLORIDE, CALCIUM CHLORIDE: 600; 310; 30; 20 INJECTION, SOLUTION INTRAVENOUS at 14:36

## 2017-07-24 RX ADMIN — PHENYLEPHRINE HYDROCHLORIDE 1 DROP: 2.5 SOLUTION/ DROPS OPHTHALMIC at 13:39

## 2017-07-24 RX ADMIN — TROPICAMIDE 1 DROP: 10 SOLUTION/ DROPS OPHTHALMIC at 13:43

## 2017-07-24 RX ADMIN — TROPICAMIDE 1 DROP: 10 SOLUTION/ DROPS OPHTHALMIC at 13:39

## 2017-07-24 RX ADMIN — DICLOFENAC SODIUM 1 DROP: 1 SOLUTION/ DROPS OPHTHALMIC at 13:43

## 2017-07-24 RX ADMIN — MOXIFLOXACIN HYDROCHLORIDE 1 DROP: 5 SOLUTION/ DROPS OPHTHALMIC at 13:26

## 2017-07-24 RX ADMIN — DICLOFENAC SODIUM 1 DROP: 1 SOLUTION/ DROPS OPHTHALMIC at 13:26

## 2017-07-24 RX ADMIN — LIDOCAINE HYDROCHLORIDE 1 ML: 10 INJECTION, SOLUTION EPIDURAL; INFILTRATION; INTRACAUDAL; PERINEURAL at 13:39

## 2017-07-24 RX ADMIN — MOXIFLOXACIN HYDROCHLORIDE 1 DROP: 5 SOLUTION/ DROPS OPHTHALMIC at 13:43

## 2017-07-24 RX ADMIN — MOXIFLOXACIN HYDROCHLORIDE 1 DROP: 5 SOLUTION/ DROPS OPHTHALMIC at 13:39

## 2017-07-24 RX ADMIN — PHENYLEPHRINE HYDROCHLORIDE 1 DROP: 2.5 SOLUTION/ DROPS OPHTHALMIC at 13:26

## 2017-07-24 RX ADMIN — TROPICAMIDE 1 DROP: 10 SOLUTION/ DROPS OPHTHALMIC at 13:26

## 2017-07-24 RX ADMIN — ONDANSETRON 4 MG: 2 INJECTION INTRAMUSCULAR; INTRAVENOUS at 14:40

## 2017-07-24 RX ADMIN — PHENYLEPHRINE HYDROCHLORIDE 1 DROP: 2.5 SOLUTION/ DROPS OPHTHALMIC at 13:43

## 2017-07-24 RX ADMIN — PROPARACAINE HYDROCHLORIDE 1 DROP: 5 SOLUTION/ DROPS OPHTHALMIC at 13:26

## 2017-07-24 RX ADMIN — PROPOFOL 20 MG: 10 INJECTION, EMULSION INTRAVENOUS at 14:40

## 2017-07-24 RX ADMIN — DICLOFENAC SODIUM 1 DROP: 1 SOLUTION/ DROPS OPHTHALMIC at 13:39

## 2017-07-24 RX ADMIN — Medication 1 DROP: at 13:27

## 2017-07-24 NOTE — IP AVS SNAPSHOT
[facility]    [address]    [place] [address]    Phone:  [phone]    Fax:  [phone]                                       After Visit Summary   7/24/2017    Frannie Roa    MRN: [id number]           After Visit Summary Signature Page     I have received my discharge instructions, and my questions have been answered. I have discussed any challenges I see with this plan with the nurse or doctor.    ..........................................................................................................................................  Patient/Patient Representative Signature      ..........................................................................................................................................  Patient Representative Print Name and Relationship to Patient    ..................................................               ................................................  Date                                            Time    ..........................................................................................................................................  Reviewed by Signature/Title    ...................................................              ..............................................  Date                                                            Time

## 2017-07-24 NOTE — ANESTHESIA PREPROCEDURE EVALUATION
Anesthesia Evaluation     . Pt has had prior anesthetic.     History of anesthetic complications    slow to wake up      ROS/MED HX    ENT/Pulmonary:       Neurologic:       Cardiovascular: Comment: S/p perc MV repair for MR - mild MR only, pulm HTN improving    (+) ----. : . CHF Last EF: 55-60 date: 6/2017 . . :. valvular problems/murmurs type: MR . pulmonary hypertension,       METS/Exercise Tolerance:  4 - Raking leaves, gardening   Hematologic:         Musculoskeletal:         GI/Hepatic:        (-) GERD   Renal/Genitourinary:         Endo:         Psychiatric:         Infectious Disease:         Malignancy:         Other:                     Physical Exam  Normal systems: dental    Airway   Mallampati: II  TM distance: >3 FB  Neck ROM: full    Dental     Cardiovascular   Rhythm and rate: regular and normal      Pulmonary    breath sounds clear to auscultation                    Anesthesia Plan      History & Physical Review  History and physical reviewed and following examination; no interval change.    ASA Status:  3 .    NPO Status:  > 8 hours    Plan for MAC with Intravenous induction. Reason for MAC:  Procedure to face, neck, head or breast    Minimize sedation  No fentanyl      Postoperative Care      Consents  Anesthetic plan, risks, benefits and alternatives discussed with:  Patient..                          .

## 2017-07-24 NOTE — DISCHARGE INSTRUCTIONS
[facility] Discharge  Instructions  Anesthesia ([facility])   Adult Discharge Instructions    For 24 hours after surgery    1. Get plenty of rest.  Make arrangements to have a responsible adult stay with you for at least 6 hours after you leave the hospital.  2. Do not drive or use heavy equipment for 24 hours.    3. Do not drink alcohol for 24 hours.  4. Do not sign legal documents or make important decisions for 24 hours.  5. Avoid strenuous or risky activities. You may feel lightheaded.  If so, sit for a few minutes before standing.  Have someone help you get up.   6. Conscious sedation patients may resume a regular diet..  7. Any questions of medical nature, call your physician.      Dr. Dennis Benz  [facility]  [phone]  Post Operative Cataract Instructions        You may remove the eye shield on arrival home and begin eye drops as directed when you get home.      Wear eye shield when sleeping for protection for 5 days.      Do not rub the operated eye.      Light sensitivity may be noticed. Sunglasses may be worn for comfort.      Some discomfort and irritation may be noticed. Acetaminophen (Tylenol) or Ibuprofen (Advil) may be taken for discomfort. If pain persists please call Dr. Benz's office.      Keep the operated eye dry. You may wash your hair, bathe or shower, but keep the operated eye closed while doing so.       Use medication exactly as prescribed by your doctor. You may restart your regular home medications.      If your procedure included an ECP (Endoscopic Cyclophotocoagulation), you should take Diamox 500 mg at bedtime as directed by your physician.       Bring any glaucoma medications with you to the clinic on your first post operative visit.      Call Dr. Benz's office if any of the following should occur:    o Any sudden vision changes  o Nausea or severe headache  o Increase in pain not controlled  o Or signs of infection (pus,  increasing redness or tenderness)

## 2017-07-24 NOTE — ANESTHESIA CARE TRANSFER NOTE
Patient: Frannie Roa    Procedure(s):  RIGHT EYE FEMTO ASSISTED PHACOEMULSIFICATION CLEAR CORNEA WITH STANDARD INTRAOCULAR LENS IMPLANT  - Wound Class: I-Clean    Diagnosis: CATARACT  Diagnosis Additional Information: No value filed.    Anesthesia Type:   MAC     Note:  Airway :Room Air  Patient transferred to:PACU        Vitals: (Last set prior to Anesthesia Care Transfer)    CRNA VITALS  7/24/2017 1430 - 7/24/2017 1502      7/24/2017             Pulse: 63    SpO2: 99 %    Resp Rate (set): 10                Electronically Signed By: ELMO Villarreal CRNA  July 24, 2017  3:02 PM

## 2017-07-24 NOTE — ANESTHESIA POSTPROCEDURE EVALUATION
Patient: Frannie Roa    Procedure(s):  RIGHT EYE FEMTO ASSISTED PHACOEMULSIFICATION CLEAR CORNEA WITH STANDARD INTRAOCULAR LENS IMPLANT  - Wound Class: I-Clean    Diagnosis:CATARACT  Diagnosis Additional Information: No value filed.    Anesthesia Type:  MAC    Note:  Anesthesia Post Evaluation    Patient location during evaluation: PACU  Patient participation: Able to fully participate in evaluation  Level of consciousness: awake  Pain management: adequate  Airway patency: patent  Cardiovascular status: acceptable  Respiratory status: acceptable  Hydration status: acceptable  PONV: none     Anesthetic complications: None          Last vitals:  Vitals:    07/24/17 1323 07/24/17 1504 07/24/17 1510   BP: 110/70 114/64 119/59   Resp: 16 16 16   Temp: 36.2  C (97.2  F)     SpO2: 96% 98% 98%         Electronically Signed By: Gala Cortez MD  July 24, 2017  3:42 PM

## 2017-07-24 NOTE — IP AVS SNAPSHOT
MRN:[id number]                      After Visit Summary   7/24/2017    Frannie Roa    MRN: [id number]           Thank you!     Thank you for choosing [facility] for your care. Our goal is always to provide you with excellent care. Hearing back from our patients is one way we can continue to improve our services. Please take a few minutes to complete the written survey that you may receive in the mail after you visit with us. Thank you!        Patient Information     Date Of Birth          1935        About your hospital stay     You were admitted on:  July 24, 2017 You last received care in the:  [facility]    You were discharged on:  July 24, 2017       Who to Call     For medical emergencies, please call 911.  For non-urgent questions about your medical care, please call your primary care provider or clinic, [phone]  For questions related to your surgery, please call your surgery clinic        Attending Provider     Provider Dennis Ghosh MD Ophthalmology       Primary Care Provider Office Phone # Fax #    Romelia Parker [phone]      Your next 10 appointments already scheduled     Jul 31, 2017  1:00 PM CDT   Cardiac Treatment with Rh Cardiac Rehab 1   [facility] ([facility])    [address], [address]  [facility] [address]   [phone]            Aug 07, 2017  1:00 PM CDT   Cardiac Treatment with Rh Cardiac Rehab 1   [facility] ([facility])    [address], [address]  [facility] [address]   [phone]            Aug 14, 2017  1:00 PM CDT   Cardiac Treatment with Rh Cardiac Rehab 1   [facility] ([facility])    [address], [address]  [facility] [address]   [phone]            Aug 16, 2017  1:00 PM CDT   Cardiac Treatment with Rh Cardiac Rehab 1   [facility]  ([facility])    [address], [address]  [facility] [address]   [phone]            Aug 18, 2017  1:00 PM CDT   Cardiac Treatment with Rh Cardiac Rehab 1   [facility] ([facility])    [address]  [facility] [address]   [phone]            Aug 21, 2017  1:00 PM CDT   Cardiac Treatment with Rh Cardiac Rehab 1   [facility] ([facility])    [address], [address]  [facility] [address]   [phone]            Aug 22, 2017  8:00 AM CDT   (Arrive by 7:45 AM)   RETURN HEART FAILURE with Asim Altamirano MD   [facility])    [address] [place] [address]   [phone]            Aug 23, 2017  1:00 PM CDT   Cardiac Treatment with Rh Cardiac Rehab 1   [facility] ([facility])    [address], [address]  [facility] [address]   [phone]            Aug 25, 2017  1:00 PM CDT   Cardiac Treatment with Rh Cardiac Rehab 1   [facility] ([facility])    [address], [address]  [facility] [address]   [phone]            Aug 28, 2017  1:00 PM CDT   Cardiac Treatment with Rh Cardiac Rehab 1   [facility] ([facility])    [address], [address]  [facility] [address]   [phone]              Further instructions from your care team       [facility] Discharge  Instructions  Anesthesia ([facility] Center)   Adult Discharge Instructions    For 24 hours after surgery    1. Get plenty of rest.  Make arrangements to have a responsible adult stay with you for at least 6 hours after you leave the hospital.  2. Do not drive or use heavy equipment for 24 hours.    3. Do not drink alcohol for 24 hours.  4. Do not sign legal documents or make important decisions for 24  hours.  5. Avoid strenuous or risky activities. You may feel lightheaded.  If so, sit for a few minutes before standing.  Have someone help you get up.   6. Conscious sedation patients may resume a regular diet..  7. Any questions of medical nature, call your physician.      Dr. Dennis Benz  [facility]  [phone]  Post Operative Cataract Instructions        You may remove the eye shield on arrival home and begin eye drops as directed when you get home.      Wear eye shield when sleeping for protection for 5 days.      Do not rub the operated eye.      Light sensitivity may be noticed. Sunglasses may be worn for comfort.      Some discomfort and irritation may be noticed. Acetaminophen (Tylenol) or Ibuprofen (Advil) may be taken for discomfort. If pain persists please call Dr. Benz's office.      Keep the operated eye dry. You may wash your hair, bathe or shower, but keep the operated eye closed while doing so.       Use medication exactly as prescribed by your doctor. You may restart your regular home medications.      If your procedure included an ECP (Endoscopic Cyclophotocoagulation), you should take Diamox 500 mg at bedtime as directed by your physician.       Bring any glaucoma medications with you to the clinic on your first post operative visit.      Call Dr. Benz's office if any of the following should occur:    o Any sudden vision changes  o Nausea or severe headache  o Increase in pain not controlled  o Or signs of infection (pus, increasing redness or tenderness)    Pending Results     No orders found from 7/22/2017 to 7/25/2017.            Admission Information     Date & Time Provider Department Dept. Phone    7/24/2017 Dennis Benz MD [facility] [phone]      Your Vitals Were     Blood Pressure Temperature Respirations Pulse Oximetry          114/64 97.2  F (36.2  C) (Temporal) 16 98%        [employer] Information     [employer] lets you send messages to your  "doctor, view your test results, renew your prescriptions, schedule appointments and more. To sign up, go to [email or web address] . Click on \"Log in\" on the left side of the screen, which will take you to the Welcome page. Then click on \"Sign up Now\" on the right side of the page.     You will be asked to enter the access code listed below, as well as some personal information. Please follow the directions to create your username and password.     Your access code is: [email or web address]  Expires: 2017  2:38 PM     Your access code will  in 90 days. If you need help or a new code, please call your [place] clinic or [phone].        Care EveryWhere ID     This is your Care EveryWhere ID. This could be used by other organizations to access your [place] medical records  [id number]        Equal Access to Services     [facility]: Juancho Saldivar, sandra lamb, jake boggs, rama campoverde . So [facility] [phone].    ATENCIÓN: Si habla español, tiene a thomas disposición servicios gratuitos de asistencia lingüística. Esvin al [phone].    We comply with applicable federal civil rights laws and [place] laws. We do not discriminate on the basis of race, color, national origin, age, disability sex, sexual orientation or gender identity.               Review of your medicines      UNREVIEWED medicines. Ask your doctor about these medicines        Dose / Directions    apixaban ANTICOAGULANT 2.5 MG tablet   Commonly known as:  ELIQUIS        Dose:  2.5 mg   Take 1 tablet (2.5 mg) by mouth 2 times daily   Quantity:  180 tablet   Refills:  3       ASPIRIN PO        Dose:  81 mg   Take 81 mg by mouth daily   Refills:  0       COUMADIN PO        Refills:  0       dorzolamide-timolol 2-0.5 % ophthalmic solution   Commonly known as:  COSOPT        Dose:  1 drop   Place 1 drop Into the left eye 2 times daily   Refills:  0       metoprolol 25 MG 24 hr tablet "   Commonly known as:  TOPROL-XL   Used for:  Mitral valve insufficiency, unspecified etiology        Dose:  12.5 mg   Take 0.5 tablets (12.5 mg) by mouth daily   Quantity:  45 tablet   Refills:  3       travoprost Z (benzalkonium) 0.004 % ophthalmic solution   Commonly known as:  TRAVATAN Z        Dose:  1 drop   Place 1 drop into both eyes every evening   Refills:  0       VIGAMOX 0.5 % ophthalmic solution   Generic drug:  moxifloxacin        Refills:  0       zinc sulfate 220 (50 ZN) MG capsule   Commonly known as:  ZINCATE        Dose:  220 mg   Take 220 mg by mouth 2 times daily   Refills:  0                Protect others around you: Learn how to safely use, store and throw away your medicines at www.disposemymeds.org.             Medication List: This is a list of all your medications and when to take them. Check marks below indicate your daily home schedule. Keep this list as a reference.      Medications           Morning Afternoon Evening Bedtime As Needed    apixaban ANTICOAGULANT 2.5 MG tablet   Commonly known as:  ELIQUIS   Take 1 tablet (2.5 mg) by mouth 2 times daily                                ASPIRIN PO   Take 81 mg by mouth daily                                COUMADIN PO                                dorzolamide-timolol 2-0.5 % ophthalmic solution   Commonly known as:  COSOPT   Place 1 drop Into the left eye 2 times daily                                metoprolol 25 MG 24 hr tablet   Commonly known as:  TOPROL-XL   Take 0.5 tablets (12.5 mg) by mouth daily                                travoprost Z (benzalkonium) 0.004 % ophthalmic solution   Commonly known as:  TRAVATAN Z   Place 1 drop into both eyes every evening                                VIGAMOX 0.5 % ophthalmic solution   Last time this was given:  1 drop on 7/24/2017  2:58 PM   Generic drug:  moxifloxacin                                zinc sulfate 220 (50 ZN) MG capsule   Commonly known as:  ZINCATE   Take 220 mg by mouth 2 times  daily

## 2017-08-14 ENCOUNTER — HOSPITAL ENCOUNTER (OUTPATIENT)
Dept: CARDIAC REHAB | Facility: CLINIC | Age: 82
End: 2017-08-14
Attending: NURSE PRACTITIONER
Payer: MEDICARE

## 2017-08-14 PROCEDURE — 93798 PHYS/QHP OP CAR RHAB W/ECG: CPT | Performed by: OCCUPATIONAL THERAPIST

## 2017-08-14 PROCEDURE — 40000116 ZZH STATISTIC OP CR VISIT: Performed by: OCCUPATIONAL THERAPIST

## 2017-08-17 NOTE — PROGRESS NOTES
"Asim Altamirano M.D.  Cardiovascular Medicine    I personally saw and examined this patient, discussed care with housestaff and other consultants, reviewed current laboratories and imaging studies, and conveyed impression and diagnostic/therapeutic plan to patient.    Problem List  1. Mitral regurgitation  2. MitraClip  3. Paroxysmal atrial fibrillation  4. Pulmonary venous hypertension/resolved    History  Patient returns for routine follow-up s/p MitraClip. No issues or hospitalizations since last clinic visit. She remains active - hiking, biking, walking, stretching, and weights. She denies any exertional sx of dyspnea, chest pain, or lightheadedness. She reports a mechanical fall yesterday - tripped on a step at her sons house. Did not hit head. She reports her energy level is \"great\", her appetite is good. She denies PND, orthopnea, edema, palpitations, nausea, vomiting, melena, blood in urine. Her weight is stable ~121 lb at home.     REVIEW of SYMPTOMS  Constitutional: without fever, chills, night sweats.  Weight is stable ~121 lb.   HEENT: without dry eyes, dry mouth, sinusitis, corryza, visual changes  Endocrine: without polyuria, polydypsia, polyphagia, heat or cold intolerance, changing mental status  Cardiology: without chest pain, tightness, heaviness, pressure, paroxysmal dyspnea, orthopnea, palpitation, pre-syncope or syncope or device discharge (if present)  Pulmonary: without asthma, wheezing, cough, hemoptysis  GI: without nausea, emesis, jaundice, pain, hematemesis, melena  : without frequency, urgency, hematuria, stones, pain, abnormal bleeding, frequency, urgency  Neurologic: without TIA, CVA, trauma, seizure  Dermatologic: without lesions, abrasion rash,   Orthopedic/Rheum: without significant joint pain, impairment, limb, polyserositis, ulceration, Raynauds  Heme: without mass, bruising, frequent infection, anemia  Psychiatric: without substance abuse, hallucination, medication, " "depression  Exercise tolerance: improved with increased tolerance and decreased shortness of breath      Objective  /67 (BP Location: Left arm, Patient Position: Chair, Cuff Size: Adult Regular)  Pulse 71  Ht 1.588 m (5' 2.5\")  Wt 55.8 kg (123 lb)  SpO2 99%  BMI 22.14 kg/m2    GENERAL: Appears comfortable, in no acute distress.   HEENT: Eye symmetrical, no discharge or icterus bilaterally. Mucous membranes moist and without lesions.  NECK: Supple, JVD not appreciable.   CV: RRR, +S1S2, soft HSM murmur at apex, rub, or gallop.   RESPIRATORY: Respirations regular, even, and unlabored. Lungs CTA throughout.   GI: Soft and non distended with normoactive bowel sounds present in all quadrants. No tenderness, rebound, guarding. No hepatomegaly.   EXTREMITIES: Trace peripheral edema. 2+ bilateral pedal pulses.   NEUROLOGIC: Alert and orientated x 3. No focal deficits.   MUSCULOSKELETAL: No joint swelling or tenderness.   SKIN: No jaundice. No rashes or lesions.    Wt Readings from Last 5 Encounters:   08/22/17 55.8 kg (123 lb)   08/18/17 55.3 kg (122 lb)   07/14/17 56 kg (123 lb 6.4 oz)   07/13/17 55.6 kg (122 lb 8 oz)   06/19/17 60 kg (132 lb 3.2 oz)       Meds  Current Outpatient Prescriptions   Medication     apixaban ANTICOAGULANT (ELIQUIS) 2.5 MG tablet     metoprolol (TOPROL-XL) 25 MG 24 hr tablet     ASPIRIN PO     zinc sulfate (ZINCATE) 220 MG capsule     dorzolamide-timolol (COSOPT) 2-0.5 % ophthalmic solution     travoprost Z, benzalkonium, (TRAVATAN Z) 0.004 % ophthalmic solution     VIGAMOX 0.5 % ophthalmic solution     No current facility-administered medications for this visit.        Labs    Results for NATALIE WILL JC (MRN [id number]) as of 8/17/2017 04:11   Ref. Range 6/21/2017 13:56 7/14/2017 08:49   Sodium Latest Ref Range: 133 - 144 mmol/L  141   Potassium Latest Ref Range: 3.4 - 5.3 mmol/L  4.4   Chloride Latest Ref Range: 94 - 109 mmol/L  105   Carbon Dioxide Latest Ref Range: 20 - 32 " mmol/L  29   Urea Nitrogen Latest Ref Range: 7 - 30 mg/dL  15   Creatinine Latest Ref Range: 0.52 - 1.04 mg/dL  0.69   GFR Estimate Latest Ref Range: >60 mL/min/1.7m2  81   GFR Estimate If Black Latest Ref Range: >60 mL/min/1.7m2  >90...   Calcium Latest Ref Range: 8.5 - 10.1 mg/dL  8.7   Anion Gap Latest Ref Range: 3 - 14 mmol/L  7   Albumin Latest Ref Range: 3.4 - 5.0 g/dL  3.7   Protein Total Latest Ref Range: 6.8 - 8.8 g/dL  7.0   Bilirubin Total Latest Ref Range: 0.2 - 1.3 mg/dL  0.5   Alkaline Phosphatase Latest Ref Range: 40 - 150 U/L  79   ALT Latest Ref Range: 0 - 50 U/L  32   AST Latest Ref Range: 0 - 45 U/L  33   Cholesterol Latest Ref Range: <200 mg/dL  253 (H)   HDL Cholesterol Latest Ref Range: >49 mg/dL  88   LDL Cholesterol Calculated Latest Ref Range: <100 mg/dL  154 (H)   Non HDL Cholesterol Latest Ref Range: <130 mg/dL  165 (H)   Triglycerides Latest Ref Range: <150 mg/dL  54   Glucose Latest Ref Range: 70 - 99 mg/dL  100 (H)   INR Latest Ref Range: 0.86 - 1.14  1.20 (H)      Imaging   TTE 6/8/2017:  Global and regional left ventricular function is normal with an EF of 55-60%.  S/p edge to edge mitral valve repair with a Mitraclip device. The device is  well anchored and stable. There is mild residual mitral regurgitation. The  mean diastolic gradient is 3 mmHg at a herat rate of 54 bpm.  Right ventricular systolic pressure is 26 mmHg above the right atrial  pressure.  The inferior vena cava is normal. Estimated mean right atrial pressure is <3  mmHg.  No pericardial effusion is present.  Compared with 5/9/2017, the degree of pulmonary hypertension has continued to  improve. The gradient across the mitral valve is lower at a slower heart rate.    ECG 6/8/2017: reviewed by me  Sinus bradycardia    Assessment/Plan   Ms. Roa is an 81 year old female s/p MitraClip who presented for follow-up. Clinically, she is doing well with high level of functional capacity. She is compensated on exam.      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 Lay, may be able to DC this     Hx of paroxysmal atrial fibrillation   No signs of recent episodes. Will continue Eliquis and Toprol 25 mg daily for continued risk of atrial fibrillation (enlarged LA).    We will contact patient with local cardiologists in [place]    Patient was seen and discussed with Dr. Altamirano    25 minutes spent in direct care, >50% in counseling      Guillermina Martinez DNP, NP-C  8/22/2017    I personally saw and examined this patient, reviewed imaging and laboratory studies, confirmed physical examination and discussed results and plan with patient and or family.  CC:  MD Romelia Rodríguez MD

## 2017-08-18 ENCOUNTER — HOSPITAL ENCOUNTER (OUTPATIENT)
Dept: CARDIAC REHAB | Facility: CLINIC | Age: 82
End: 2017-08-18
Attending: NURSE PRACTITIONER
Payer: MEDICARE

## 2017-08-18 VITALS — HEIGHT: 63 IN | BODY MASS INDEX: 21.62 KG/M2 | WEIGHT: 122 LBS

## 2017-08-18 PROCEDURE — 93798 PHYS/QHP OP CAR RHAB W/ECG: CPT | Performed by: REHABILITATION PRACTITIONER

## 2017-08-18 PROCEDURE — 40000116 ZZH STATISTIC OP CR VISIT: Performed by: REHABILITATION PRACTITIONER

## 2017-08-18 ASSESSMENT — 6 MINUTE WALK TEST (6MWT)
FEMALE CALC: 1334.63
GENDER SELECTION: FEMALE
PREDICTED: 1283.38
MALE CALC: 1275.6
TOTAL DISTANCE WALKED (FT): 1413

## 2017-08-18 NOTE — PROGRESS NOTES
Frannie Roa  81 year old  Mitral clip valve repair   08/18/17 1400   Session   Session 120 Day Individualized Treatment Plan   Certified through this date 09/23/17   Cardiac Rehab Assessment    Physician cosignature/electronic signature indicates approval of this ITP document. I have established, reviewed and made necessary changes to the individualized treatment plan and exercise prescription for this patient.   Cardiac Rehab Assessment 5/18/17 Pt. presents to intial evaluation following a mitral valve clip on 5/8/17. Pt. has been followed for a few years after physicians heard a heart murmur and noticed the mitral valve was slightly leaky. Procedure was successful and pt. was discharged on 5/9/17. Later that day while at home, pt. noticed her heart rate felt very fast. Pt. went back into the ER, but was found to be in SR. Pt. has a history of afib starting back in March 2017 and was placed on coumadin. Pt. is currently wearing an event monitor for 2 weeks. Pt. has not felt any palpatations or increased rates. Pt. is a very active individual who was participating at [employer] using the stair climber, biking outside 20-25 miles, and going to 30 minute walks before the procedure. Pt. is feeling fine since discharge from the ER and is looking forward to resuming her previous activity level.6/8/17 ITP forwarded for review by medical director. 6/19/2017 Progress update done today. PT does very well for her age. She is walking regularly and gardening in her yard. Her appetite is good and she is sleeping well at night. PT will be switching from warfarin to eliquis soon. Her rhythm has been stable. She continues to benefit from skilled monitoring with exercise levels due to history of atrial fibrillation. 7/14/2017 Pt has been progressing well with exercise. She states she is walking at a moderate pace outside of sessions for more then 30 minutes, 3-4 day a week. PT has met multiple goals in exercise (see  in section). Pt has not yet met her MET goal.  Pt has been keeping up with her nutrition and states she doesnt need education. Pt has been feeling moderatly anxious from fighting with her spouse, but is finding positive ways to cope with her stress. Pt enjoys coming to sessions and would like to continue rehab untill she is back to ADLs. Pt will benefit from monitored exercise sessions for watching changes in EKG, BP, and CV responce with exercise, also she weill benefit from education on ways to mannaging her anxiety.  8/18/17 Patient reports everything is rehab is going great and has a plan in place for after DC from rehab. She will DC from rehab in 1 week and reports minimal changes since last rehab session. Patient continues to benefit from skilled therapy to monitor CV response to exercise, to educate on risk management and to assist patient in stress/ anxiety management to help progress her towarrds her goals.   General Information   Treatment Diagnosis Valve Repair  (Mitral Clip)   Date of Treatment Diagnosis 05/08/17   Significant Past CV History None   Comorbidities None   Lead up symptoms Heart [facility] followed for 3 years   Hospital Location [facility]   Hospital Discharge Date 05/09/17   Signs and Symptoms Post Hospital Discharge Fatigue;Lightheadedness   Outpatient Cardiac Rehab Start Date 05/18/17   Primary Physician Dr. Parker   Primary Physician Follow Up Completed   Surgeon Dr. Phan   Surgeon Follow Up Scheduled   Cardiologist Dr. Altamirano   Cardiologist Follow Up Advised to schedule appointment   Ejection Fraction 60-65%   Risk Stratification Low   Summary of Cath Report   Summary of Cath Report No Significant CAD   Living and Work Status    Living Arrangements and Social Status house   Support System Live with an adult   Return to Employment Retired   Occupation , professional    Preventative Medications   CMS recommended medications Beta  "Blocker;Antiplatelets;Anticoagulants;Influenza vaccination;Pneumonia vaccination   Falls Screen   Have you fallen two or more times in the past year? No   Have you fallen and had an injury in the past year? No   Referral Initiated to Physical Therapy No   Pain   Patient Currently in Pain Denies   Physical Assessments   Incisions WNL   Edema None   Right Lung Sounds not assessed   Left Lung Sounds not assessed   Limitations No limitations   Individualized Treatment Plan   Monitored Sessions Scheduled 24   Monitored Sessions Attended 17   Oxygen   Supplemental Oxygen needed No   Nutrition Management - Weight Management   Assessment Re-assessment   Age 81   Weight 55.3 kg (122 lb)   Height 1.588 m (5' 2.52\")   BMI (Calculated) 21.99   Initial Rate Your Plate Score. Dietary tool to assess eating patterns. Scores range from 24 to 72. The higher the score the healthier the eating pattern. 68   Weight Management Comments 6/19/2017 Her weight is appropriate and BMI is <25.7/14/17 Her weight is appropriate.   Nutrition Management - Lipids   Lipids Labs Available   Date 03/17/17   Total Cholesterol 233   Triglycerides 89   HDL 68      Prescribed Lipid Medication No   Nutrition Management - Diabetes   Diabetes No   Nutrition Management Summary   Dietary Recommendations Anticoagulation Therapy   Stages of Change for Diet Compliance Action   Nutrition Summary Comments 5/18/17 Pt. feels she eats very healthy and knows what to do. 6/19/2017 No change.7/14/17 No change.   Psychosocial Management   Psychosocial Assessment Re-assessment   Is there history of clinical depression or increased risk of depression? No previous history   Current Level of Stress per Patient Report Mild   Current Coping Skills Has Positive Support System;Uses Stress Management/Relaxation Techniques   Initial Patient Health Questionnaire -9 Score (PHQ-9) for depression. 5-9 Minimal symptoms, 10-14 Minor depression, 15-19 Major depression, moderately " severe, > 20 Major depression, severe  2   Initial [facility] Survey score.  Quality of Life:   If total score > 25 review individual areas where patient rated a 4 or 5.  Consider patients current medical condition and what role that plays on the score.   Adjust treatment protocol to improve areas of concern.  Consider the following:  PHQ9 score, DASI, and re-assessment within the next 30 days to assist with developing treatments.  16   Stages of Change Maintenance   Interventions Planned Patient to verbalize understanding of negative impact of stress to personal health;Patient to verbalize understanding of behavioral assessment results;Patient will recognize signs and symptoms of depression   Patient Goal No   Psychosocial Comments 2017 PT denies any depression and PHQ score is wnl. Her brother recently  and she will be going to the  out of state. He had been ill for a while. PT has a supportive network of friends.17 Pt states she may split from her partner soon that they have been fighting. She has been feeling more stressed out from fighting but has been going to a emotion mannagment class that is helping her cope with her anxiety. Pt states he family and friends are being supportive and she may move in with her grandaughters if she does decide to leave her spouse. Pt states she does not feel unsafe at home.     Other Core Components - Hypertension   History of or Diagnosis of Hypertension No   Currently taking Anti-Hypertensives Yes;Beta blocker   Hypertension Comments 2017 BP has been well-controlled. 17 no change   Other Core Components - Tobacco   History of Tobacco Use Never   Other Core Components Summary   Interventions Planned Educate on importance of maintaining low sodium diet  (17 Pt feels educated on sodium intake and is cautious. )   Interventions In Progress or Completed Educated on importance of maintaining low sodium diet   Patient Goals Yes   Goal #1  Description Pt. will increase strength and endurance to be able to garden by attending cardiac rehab 3 days per week.   Goal #1 Target Date 07/18/17   Goal #1 Date Met 06/19/17   Goal #1 Progress Towards Goal 6/19/2017 PT has been gardening and walking without sx. She is attending cardiac rehab about 2 days per week. Goal MET, 7/14/17 Pt has not yet goal Met level but is doing the rest of the goal.    Other Core Components Comments 6/19/2017 PT has the information she needs with diet.7/14/17 no change.    Activity/Exercise History   Activity/Exercise Assessment Re-assessment   Activity/Exercise Status prior to event? Participated in an Exercise Program;Was Physically Active   Number of Days Currently participating in Moderate Physical Activity? 2-3   Number of Days Currently performing  Aerobic Exercise (including rehab)? 7   Number of Minutes per Session Currently of Aerobic Exercise (average)? 25-40   Current Stage of Change (Physical Activity) Preparation   Current Stage of Change (Aerobic Exercise) Action   Patient Goals Goal #1;Goal #2   Goal #1 Description Pt. will be able to walk 30 minutes 3-4 days per week with a stable CV response   Goal #1 Target Date 07/18/17   Goal #1 Date Met 07/14/17   Goal #1 Progress Towards Goal 6/19/2017 PT is walking for up to 20 minutes at a time without problem.   Goal #2 Description Pt. will be able to bike 20 miles.   Goal #2 Target Date 07/18/17   Goal #2 Date Met 07/14/17   Goal #2 Progress Towards Goal 6/19/2017 PT has not done this yet. She is focusing on walking for now.   Activity/Exercise Comments 6/19/2017 PT does not have any concerns so far with her exercise, but is reluctant to try other modalities. She has started light weights. 7/14/17 Pt regularly exercises at home by walking for greater then 20 minutes each day without getting winded.  Pt has not been biking due feeling dizzy. Pt will be joining a gym soon to help with her strengthening program.     Activity/Exercise Target Outcome An Accumulation of 150  Minutes of Aerobic Activity per Week   Exercise Assessment   6 Minute Walk Predicted - Gender Selection Female   6 Minute Walk Predicted (Male) 1275.6   6 Minute Walk Predicted (Female) 1334.63   Initial 6 Minute Walk Distance (Feet) 1413 ft   Resting HR 76 bpm   Exercise HR 96 bpm   Post Exercise HR 82 bpm   Resting /58   Exercise /60   Post Exercise /60   Current MET Level 4.1   MET Level Goal 4.5-5   ECG Rhythm Sinus rhythm   Ectopy None   Current Symptoms Denies symptoms   Limitations/Restrictions None   Exercise Prescription   Mode Recumbant bike;Ambulation;Weights   Duration/Time 30-45 min   Frequency 2 days/week   THR (85% of age predicted max HR) 118.15   OMNI Effort Rating (0-10 Scale) 4-6/10   Progression Continuous bouts;Total exercise time of 30-45 minutes;Progress peak intensity by 1/4 MET per week;Aerobic exercise to OMNI rating of 6 or below and at or below THR   Recommended Home Exercise   Type of Exercise Walking   Frequency (days per week) 4-5   Duration (minutes per session) 45-60 min aerobic exercise   Effort Rating Recommended 4-6/10   30 Day Exercise Plan Pt. was encouraged to begin an at home walking program starting with 5-10 minutes and gradually increase in duration as tolerated. 7/14/17 Pt is progressing well with home wqalking program and plans to continue to progress in sessions and at home.   Current Home Exercise   Type of Exercise Walking   Frequency (days per week) 5-7   Duration (minutes per session) 25-40   Follow-up/On-going Support   Provider follow-up needed on the following No follow-up needed   Learning Assessment   Learner Patient   Primary Language English   Preferred Learning Style Listening;Reading;Demonstration;Pictures/Video   Barriers to Learning No barriers noted   Patient Education   Education recommended Anatomy and Physiology of the Heart;Blood Pressure;Exercise Principles;Medication  Overview;Nutrition;Stress Management   Education Comments 7/14/17 Pt doesnt feel any need for education classes.

## 2017-08-21 ENCOUNTER — PRE VISIT (OUTPATIENT)
Dept: CARDIOLOGY | Facility: CLINIC | Age: 82
End: 2017-08-21

## 2017-08-21 ENCOUNTER — DOCUMENTATION ONLY (OUTPATIENT)
Dept: OTHER | Facility: CLINIC | Age: 82
End: 2017-08-21

## 2017-08-21 DIAGNOSIS — Z71.89 ADVANCED DIRECTIVES, COUNSELING/DISCUSSION: Chronic | ICD-10-CM

## 2017-08-22 ENCOUNTER — OFFICE VISIT (OUTPATIENT)
Dept: CARDIOLOGY | Facility: CLINIC | Age: 82
End: 2017-08-22
Attending: INTERNAL MEDICINE
Payer: MEDICARE

## 2017-08-22 VITALS
SYSTOLIC BLOOD PRESSURE: 113 MMHG | HEART RATE: 71 BPM | HEIGHT: 63 IN | OXYGEN SATURATION: 99 % | BODY MASS INDEX: 21.79 KG/M2 | DIASTOLIC BLOOD PRESSURE: 67 MMHG | WEIGHT: 123 LBS

## 2017-08-22 DIAGNOSIS — I34.0 MITRAL VALVE INSUFFICIENCY, UNSPECIFIED ETIOLOGY: Primary | ICD-10-CM

## 2017-08-22 DIAGNOSIS — I48.0 PAROXYSMAL ATRIAL FIBRILLATION (H): ICD-10-CM

## 2017-08-22 PROCEDURE — 99213 OFFICE O/P EST LOW 20 MIN: CPT | Mod: ZF

## 2017-08-22 PROCEDURE — 99214 OFFICE O/P EST MOD 30 MIN: CPT | Mod: ZP | Performed by: INTERNAL MEDICINE

## 2017-08-22 ASSESSMENT — PAIN SCALES - GENERAL: PAINLEVEL: NO PAIN (0)

## 2017-08-22 NOTE — LETTER
"8/22/2017      RE: Frannie Roa  [address]  [facility] [address]       Dear Colleague,    Thank you for the opportunity to participate in the care of your patient, Frannie Roa, at the [facility]. Please see a copy of my visit note below.    Asim Altamirano M.D.  Cardiovascular Medicine    I personally saw and examined this patient, discussed care with housestaff and other consultants, reviewed current laboratories and imaging studies, and conveyed impression and diagnostic/therapeutic plan to patient.    Problem List  1. Mitral regurgitation  2. MitraClip  3. Paroxysmal atrial fibrillation  4. Pulmonary venous hypertension/resolved    History  Patient returns for routine follow-up s/p MitraClip. No issues or hospitalizations since last clinic visit. She remains active - hiking, biking, walking, stretching, and weights. She denies any exertional sx of dyspnea, chest pain, or lightheadedness. She reports a mechanical fall yesterday - tripped on a step at her sons house. Did not hit head. She reports her energy level is \"great\", her appetite is good. She denies PND, orthopnea, edema, palpitations, nausea, vomiting, melena, blood in urine. Her weight is stable ~121 lb at home.     REVIEW of SYMPTOMS  Constitutional: without fever, chills, night sweats.  Weight is stable ~121 lb.   HEENT: without dry eyes, dry mouth, sinusitis, corryza, visual changes  Endocrine: without polyuria, polydypsia, polyphagia, heat or cold intolerance, changing mental status  Cardiology: without chest pain, tightness, heaviness, pressure, paroxysmal dyspnea, orthopnea, palpitation, pre-syncope or syncope or device discharge (if present)  Pulmonary: without asthma, wheezing, cough, hemoptysis  GI: without nausea, emesis, jaundice, pain, hematemesis, melena  : without frequency, urgency, hematuria, stones, pain, abnormal bleeding, frequency, " "urgency  Neurologic: without TIA, CVA, trauma, seizure  Dermatologic: without lesions, abrasion rash,   Orthopedic/Rheum: without significant joint pain, impairment, limb, polyserositis, ulceration, Raynauds  Heme: without mass, bruising, frequent infection, anemia  Psychiatric: without substance abuse, hallucination, medication, depression  Exercise tolerance: improved with increased tolerance and decreased shortness of breath      Objective  /67 (BP Location: Left arm, Patient Position: Chair, Cuff Size: Adult Regular)  Pulse 71  Ht 1.588 m (5' 2.5\")  Wt 55.8 kg (123 lb)  SpO2 99%  BMI 22.14 kg/m2    GENERAL: Appears comfortable, in no acute distress.   HEENT: Eye symmetrical, no discharge or icterus bilaterally. Mucous membranes moist and without lesions.  NECK: Supple, JVD not appreciable.   CV: RRR, +S1S2, soft HSM murmur at apex, rub, or gallop.   RESPIRATORY: Respirations regular, even, and unlabored. Lungs CTA throughout.   GI: Soft and non distended with normoactive bowel sounds present in all quadrants. No tenderness, rebound, guarding. No hepatomegaly.   EXTREMITIES: Trace peripheral edema. 2+ bilateral pedal pulses.   NEUROLOGIC: Alert and orientated x 3. No focal deficits.   MUSCULOSKELETAL: No joint swelling or tenderness.   SKIN: No jaundice. No rashes or lesions.    Wt Readings from Last 5 Encounters:   08/22/17 55.8 kg (123 lb)   08/18/17 55.3 kg (122 lb)   07/14/17 56 kg (123 lb 6.4 oz)   07/13/17 55.6 kg (122 lb 8 oz)   06/19/17 60 kg (132 lb 3.2 oz)       Meds  Current Outpatient Prescriptions   Medication     apixaban ANTICOAGULANT (ELIQUIS) 2.5 MG tablet     metoprolol (TOPROL-XL) 25 MG 24 hr tablet     ASPIRIN PO     zinc sulfate (ZINCATE) 220 MG capsule     dorzolamide-timolol (COSOPT) 2-0.5 % ophthalmic solution     travoprost Z, benzalkonium, (TRAVATAN Z) 0.004 % ophthalmic solution     VIGAMOX 0.5 % ophthalmic solution     No current facility-administered medications for this " visit.        Labs    Results for WILL ESTRADA (MRN [id number]) as of 8/17/2017 04:11   Ref. Range 6/21/2017 13:56 7/14/2017 08:49   Sodium Latest Ref Range: 133 - 144 mmol/L  141   Potassium Latest Ref Range: 3.4 - 5.3 mmol/L  4.4   Chloride Latest Ref Range: 94 - 109 mmol/L  105   Carbon Dioxide Latest Ref Range: 20 - 32 mmol/L  29   Urea Nitrogen Latest Ref Range: 7 - 30 mg/dL  15   Creatinine Latest Ref Range: 0.52 - 1.04 mg/dL  0.69   GFR Estimate Latest Ref Range: >60 mL/min/1.7m2  81   GFR Estimate If Black Latest Ref Range: >60 mL/min/1.7m2  >90...   Calcium Latest Ref Range: 8.5 - 10.1 mg/dL  8.7   Anion Gap Latest Ref Range: 3 - 14 mmol/L  7   Albumin Latest Ref Range: 3.4 - 5.0 g/dL  3.7   Protein Total Latest Ref Range: 6.8 - 8.8 g/dL  7.0   Bilirubin Total Latest Ref Range: 0.2 - 1.3 mg/dL  0.5   Alkaline Phosphatase Latest Ref Range: 40 - 150 U/L  79   ALT Latest Ref Range: 0 - 50 U/L  32   AST Latest Ref Range: 0 - 45 U/L  33   Cholesterol Latest Ref Range: <200 mg/dL  253 (H)   HDL Cholesterol Latest Ref Range: >49 mg/dL  88   LDL Cholesterol Calculated Latest Ref Range: <100 mg/dL  154 (H)   Non HDL Cholesterol Latest Ref Range: <130 mg/dL  165 (H)   Triglycerides Latest Ref Range: <150 mg/dL  54   Glucose Latest Ref Range: 70 - 99 mg/dL  100 (H)   INR Latest Ref Range: 0.86 - 1.14  1.20 (H)      Imaging   TTE 6/8/2017:  Global and regional left ventricular function is normal with an EF of 55-60%.  S/p edge to edge mitral valve repair with a Mitraclip device. The device is  well anchored and stable. There is mild residual mitral regurgitation. The  mean diastolic gradient is 3 mmHg at a herat rate of 54 bpm.  Right ventricular systolic pressure is 26 mmHg above the right atrial  pressure.  The inferior vena cava is normal. Estimated mean right atrial pressure is <3  mmHg.  No pericardial effusion is present.  Compared with 5/9/2017, the degree of pulmonary hypertension has continued  to  improve. The gradient across the mitral valve is lower at a slower heart rate.    ECG 6/8/2017: reviewed by me  Sinus bradycardia    Assessment/Plan   Ms. Roa is an 81 year old female s/p MitraClip who presented for follow-up. Clinically, she is doing well with high level of functional capacity. She is compensated on exam.     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 Lay, may be able to DC this     Hx of paroxysmal atrial fibrillation   No signs of recent episodes. Will continue Eliquis and Toprol 25 mg daily for continued risk of atrial fibrillation (enlarged LA).    We will contact patient with local cardiologists in [place]    Patient was seen and discussed with Dr. Altamirano    25 minutes spent in direct care, >50% in counseling        Guillermina Martinez DNP, NP-C  8/22/2017    CC:  MD Romelia Rodríguez MD

## 2017-08-22 NOTE — MR AVS SNAPSHOT
After Visit Summary   8/22/2017    Frannie Roa    MRN: [id number]           Patient Information     Date Of Birth          1935        Visit Information        Provider Department      8/22/2017 8:00 AM Asim Altamirano MD [facility]        Care Instructions    You were seen at the [facility] clinic today.  You saw Dr. Altamirano  Here are your Instructions:    1.  No follow up at this time given you are moving.      Page James RN  Nurse Care Coordinator  Office:  [phone] option #1, then #3 & ask for Page (nurse line)  Fax:  [phone]  After Hours:  [phone]  Appointments:  [phone] option #1, then option #1                        Follow-ups after your visit        Your next 10 appointments already scheduled     Aug 28, 2017 11:00 AM CDT   Cardiac Discharge with [facility] 1   [facility] ([facility])    [address] [address] [address] [phone]              Who to contact     If you have questions or need follow up information about today's clinic visit or your schedule please contact [facility] directly at [phone].  Normal or non-critical lab and imaging results will be communicated to you by MyChart, letter or phone within 4 business days after the clinic has received the results. If you do not hear from us within 7 days, please contact the clinic through MyChart or phone. If you have a critical or abnormal lab result, we will notify you by phone as soon as possible.  Submit refill requests through [employer] or call your pharmacy and they will forward the refill request to us. Please allow 3 business days for your refill to be completed.          Additional Information About Your Visit        [employer] Information     [employer] lets you send messages to your doctor, view your test results, renew your prescriptions, schedule appointments  "and more. To sign up, go to [email or web address] . Click on \"Log in\" on the left side of the screen, which will take you to the Welcome page. Then click on \"Sign up Now\" on the right side of the page.     You will be asked to enter the access code listed below, as well as some personal information. Please follow the directions to create your username and password.     Your access code is: [email or web address]  Expires: 2017  2:38 PM     Your access code will  in 90 days. If you need help or a new code, please call your [place] clinic or [phone].        Care EveryWhere ID     This is your Care EveryWhere ID. This could be used by other organizations to access your [place] medical records  [id number]        Your Vitals Were     Pulse Height Pulse Oximetry BMI (Body Mass Index)          71 1.588 m (5' 2.5\") 99% 22.14 kg/m2         Blood Pressure from Last 3 Encounters:   17 113/67   17 119/59   17 102/66    Weight from Last 3 Encounters:   17 55.8 kg (123 lb)   17 55.3 kg (122 lb)   17 56 kg (123 lb 6.4 oz)              Today, you had the following     No orders found for display       Primary Care Provider Office Phone # Fax #    Romelia Parker [phone]       [address] [address]        Equal Access to Services     [facility] : Hadii malik del rio hadasho Sowolfali, waaxda luqadaha, qaybta kaalmada adeegyacipriano, rama dewitt. So [facility] [phone].    ATENCIÓN: Si habla español, tiene a thomas disposición servicios gratuitos de asistencia lingüística. Llame al [phone].    We comply with applicable federal civil rights laws and [place] laws. We do not discriminate on the basis of race, color, national origin, age, disability sex, sexual orientation or gender identity.            Thank you!     Thank you for choosing [facility]  for your care. Our goal is always to provide you with " excellent care. Hearing back from our patients is one way we can continue to improve our services. Please take a few minutes to complete the written survey that you may receive in the mail after your visit with us. Thank you!             Your Updated Medication List - Protect others around you: Learn how to safely use, store and throw away your medicines at www.disposemymeds.org.          This list is accurate as of: 8/22/17  8:53 AM.  Always use your most recent med list.                   Brand Name Dispense Instructions for use Diagnosis    apixaban ANTICOAGULANT 2.5 MG tablet    ELIQUIS    180 tablet    Take 1 tablet (2.5 mg) by mouth 2 times daily        ASPIRIN PO      Take 81 mg by mouth daily        COUMADIN PO           dorzolamide-timolol 2-0.5 % ophthalmic solution    COSOPT     Place 1 drop Into the left eye 2 times daily        metoprolol 25 MG 24 hr tablet    TOPROL-XL    45 tablet    Take 0.5 tablets (12.5 mg) by mouth daily    Mitral valve insufficiency, unspecified etiology       travoprost Z (benzalkonium) 0.004 % ophthalmic solution    TRAVATAN Z     Place 1 drop into both eyes every evening        VIGAMOX 0.5 % ophthalmic solution   Generic drug:  moxifloxacin           zinc sulfate 220 (50 ZN) MG capsule    ZINCATE     Take 220 mg by mouth 2 times daily

## 2017-08-22 NOTE — NURSING NOTE
Chief Complaint   Patient presents with     Follow Up For     4 month f/u hf     Vitals were taken and medications were reconciled.     Racheal Mora,RMA  7:52 AM

## 2017-08-22 NOTE — PATIENT INSTRUCTIONS
You were seen at the [facility] clinic today.  You saw Dr. Altamirano  Here are your Instructions:    1.  No follow up at this time given you are moving.      Page James RN  Nurse Care Coordinator  Office:  [phone] option #1, then #3 & ask for Page (nurse line)  Fax:  [phone]  After Hours:  [phone]  Appointments:  [phone] option #1, then option #1

## 2017-08-24 ENCOUNTER — HOSPITAL ENCOUNTER (OUTPATIENT)
Dept: CARDIAC REHAB | Facility: CLINIC | Age: 82
End: 2017-08-24
Attending: NURSE PRACTITIONER
Payer: MEDICARE

## 2017-08-24 PROCEDURE — 93798 PHYS/QHP OP CAR RHAB W/ECG: CPT | Performed by: OCCUPATIONAL THERAPIST

## 2017-08-24 PROCEDURE — 40000116 ZZH STATISTIC OP CR VISIT: Performed by: OCCUPATIONAL THERAPIST

## 2017-08-28 ENCOUNTER — HOSPITAL ENCOUNTER (OUTPATIENT)
Dept: CARDIAC REHAB | Facility: CLINIC | Age: 82
End: 2017-08-28
Attending: NURSE PRACTITIONER
Payer: MEDICARE

## 2017-08-28 VITALS — HEIGHT: 63 IN | WEIGHT: 122 LBS | BODY MASS INDEX: 21.62 KG/M2

## 2017-08-28 PROCEDURE — 40000116 ZZH STATISTIC OP CR VISIT

## 2017-08-28 PROCEDURE — 93798 PHYS/QHP OP CAR RHAB W/ECG: CPT

## 2017-08-28 ASSESSMENT — 6 MINUTE WALK TEST (6MWT)
TOTAL DISTANCE WALKED (FT): 1578
PREDICTED: 1283.38
FEMALE CALC: 1334.63
TOTAL DISTANCE WALKED (FT): 1413
MALE CALC: 1275.6
GENDER SELECTION: FEMALE

## 2017-08-28 NOTE — PROGRESS NOTES
Frannie Jhaveri  81 years old  DX: MV clip 08/28/17 1200   Session   Session Discharge Note   Cardiac Rehab Assessment    Physician cosignature/electronic signature indicates agreements of ITP document and approval of discharge.    Cardiac Rehab Assessment Pt is discharging from CR after completing 19 sessions. She has progressed very well in CR and has completed all 3 of her goals in CR. She is now able to tolerate housework and yard work without symptoms or fatigue. She has also been able to walk for 35 minutes, 3-4 days per week with a stable CV response. Pt made significant gains in exercise tolerance. Initially patient tolerated 15 minutes at 3.1 METs, now tolerating 35 minutes at 4.1 METs. Patient also increased 6-minute walk test by 10.5% (an increase of 165 feet.) The Pt was given instructions on frequency, intensity, and duration for continued exercise as well as muscle conditioning and stretching exercises.  Your Pt plans to continue exercising at [employer] for 60 minutes, 3-4 days a week along with walking and completing osteoporosis exercises 4-7 days per week and muscle conditioning exercises 2-3 days per week. She is very motivated to continue exercise consistently to continue to increase her strength, endurance and energy levels.      General Information   Treatment Diagnosis Valve Repair  (Mitral Clip)   Date of Treatment Diagnosis 05/08/17   Significant Past CV History None   Comorbidities None   Lead up symptoms Heart [facility] followed for 3 years   Hospital Location [facility]   Hospital Discharge Date 05/09/17   Signs and Symptoms Post Hospital Discharge Fatigue;Lightheadedness   Outpatient Cardiac Rehab Start Date 05/18/17   Primary Physician Dr. Parker   Primary Physician Follow Up Completed   Surgeon Dr. Phan   Surgeon Follow Up Scheduled   Cardiologist Dr. Altamirano   Cardiologist Follow Up Completed   Ejection Fraction 60-65%   Risk Stratification Low   Summary of Cath Report  "  Summary of Cath Report No Significant CAD   Living and Work Status    Living Arrangements and Social Status house   Support System Live with an adult   Return to Employment Retired   Occupation , professional    Preventative Medications   CMS recommended medications Beta Blocker;Antiplatelets;Anticoagulants;Influenza vaccination;Pneumonia vaccination   Falls Screen   Have you fallen two or more times in the past year? No   Have you fallen and had an injury in the past year? No   Referral Initiated to Physical Therapy No   Pain   Patient Currently in Pain Denies   Physical Assessments   Incisions WNL   Edema None   Right Lung Sounds not assessed   Left Lung Sounds not assessed   Limitations No limitations   Individualized Treatment Plan   Monitored Sessions Scheduled 24   Monitored Sessions Attended 19   Oxygen   Supplemental Oxygen needed No   Nutrition Management - Weight Management   Assessment Discharge   Age 81   Weight 55.3 kg (122 lb)   Height 1.588 m (5' 2.52\")   BMI (Calculated) 21.99   Initial Rate Your Plate Score. Dietary tool to assess eating patterns. Scores range from 24 to 72. The higher the score the healthier the eating pattern. 68   Discharge Rate Your Plate Score 65   Weight Management Comments 6/19/2017 Her weight is appropriate and BMI is <25.7/14/17 Her weight is appropriate.   Nutrition Management - Lipids   Lipids Labs Available   Date 07/14/17   Total Cholesterol 253   Triglycerides 54   HDL 88      Prescribed Lipid Medication No   Nutrition Management - Diabetes   Diabetes No   Nutrition Management Summary   Dietary Recommendations Anticoagulation Therapy   Stages of Change for Diet Compliance Action   Nutrition Summary Comments 5/18/17 Pt. feels she eats very healthy and knows what to do. 6/19/2017 No change.7/14/17 No change. 8/28 No change- Pt has a healthy diet and denies the need for any changes to her diet.    Psychosocial Management   Psychosocial " Assessment Discharge   Is there history of clinical depression or increased risk of depression? No previous history   Current Level of Stress per Patient Report Mild   Current Coping Skills Has Positive Support System;Uses Stress Management/Relaxation Techniques   Initial Patient Health Questionnaire -9 Score (PHQ-9) for depression. 5-9 Minimal symptoms, 10-14 Minor depression, 15-19 Major depression, moderately severe, > 20 Major depression, severe  2   Discharge PHQ-9 Score for Depression 1   Initial [facility] Survey score.  Quality of Life:   If total score > 25 review individual areas where patient rated a 4 or 5.  Consider patients current medical condition and what role that plays on the score.   Adjust treatment protocol to improve areas of concern.  Consider the following:  PHQ9 score, DASI, and re-assessment within the next 30 days to assist with developing treatments.  16   Discharge [facility] Survey Score 15   Stages of Change Maintenance   Interventions Planned Patient to verbalize understanding of negative impact of stress to personal health;Patient to verbalize understanding of behavioral assessment results;Patient will recognize signs and symptoms of depression   Patient Goal No   Psychosocial Comments 2017 PT denies any depression and PHQ score is wnl. Her brother recently  and she will be going to the  out of state. He had been ill for a while. PT has a supportive network of friends.17 Pt states she may split from her partner soon that they have been fighting. She has been feeling more stressed out from fighting but has been going to a emotion mannagment class that is helping her cope with her anxiety. Pt states he family and friends are being supportive and she may move in with her grandaughters if she does decide to leave her spouse. Pt states she does not feel unsafe at home.     Other Core Components - Hypertension   History of or Diagnosis of Hypertension No    Currently taking Anti-Hypertensives Yes;Beta blocker   Hypertension Comments 6/19/2017 BP has been well-controlled. 7/14/17 no change. 8/28: No change- BP has been WNL's and well controlled.   Other Core Components - Tobacco   History of Tobacco Use Never   Other Core Components Summary   Interventions Planned Educate on importance of maintaining low sodium diet  (7/14/17 Pt feels educated on sodium intake and is cautious. )   Interventions In Progress or Completed Educated on importance of maintaining low sodium diet   Patient Goals Yes   Goal #1 Description Pt. will increase strength and endurance to be able to garden by attending cardiac rehab 3 days per week.   Goal #1 Target Date 07/18/17   Goal #1 Date Met 06/19/17   Goal #1 Progress Towards Goal 6/19/2017 PT has been gardening and walking without sx. She is attending cardiac rehab about 2 days per week. Goal MET, 7/14/17 Pt has not yet goal Met level but is doing the rest of the goal.    Other Core Components Comments 6/19/2017 PT has the information she needs with diet.7/14/17 no change.    Activity/Exercise History   Activity/Exercise Assessment Discharge   Activity/Exercise Status prior to event? Participated in an Exercise Program;Was Physically Active   Number of Days Currently participating in Moderate Physical Activity? 2-3   Number of Days Currently performing  Aerobic Exercise (including rehab)? 7   Number of Minutes per Session Currently of Aerobic Exercise (average)? 25-40   Current Stage of Change (Physical Activity) Preparation   Current Stage of Change (Aerobic Exercise) Action   Patient Goals Goal #1;Goal #2   Goal #1 Description Pt. will be able to walk 30 minutes 3-4 days per week with a stable CV response   Goal #1 Target Date 07/18/17   Goal #1 Date Met 07/14/17   Goal #1 Progress Towards Goal 6/19/2017 PT is walking for up to 20 minutes at a time without problem.   Goal #2 Description Pt. will be able to bike 20 miles.   Goal #2 Target  Date 07/18/17   Goal #2 Date Met 07/14/17   Goal #2 Progress Towards Goal 6/19/2017 PT has not done this yet. She is focusing on walking for now. 8/28: Pt feels like she would be able to bike for at least 20 minutes but does not want to try this due to fear of falling off (her medications cause dizziness occ.).    Activity/Exercise Comments 6/19/2017 PT does not have any concerns so far with her exercise, but is reluctant to try other modalities. She has started light weights. 7/14/17 Pt regularly exercises at home by walking for greater then 20 minutes each day without getting winded.  Pt has not been biking due feeling dizzy. Pt will be joining a gym soon to help with her strengthening program. 8/28: Pt has been gardening and doing housework without any difficulties. She has also been walking and doing her osteoporosis exercises 4 days per week.    Activity/Exercise Target Outcome An Accumulation of 150  Minutes of Aerobic Activity per Week   Exercise Assessment   6 Minute Walk Predicted - Gender Selection Female   6 Minute Walk Predicted (Male) 1275.6   6 Minute Walk Predicted (Female) 1334.63   Initial 6 Minute Walk Distance (Feet) 1413 ft   Discharge 6 Minute Walk Distance (Feet) 1578   Resting HR 70 bpm   Exercise  bpm   Post Exercise HR 64 bpm   Resting /62   Exercise /62   Post Exercise BP 96/62   Effort Rating 3   Current MET Level 4.1   MET Level Goal 4.4   ECG Rhythm Sinus rhythm   Ectopy None   Current Symptoms Denies symptoms   Limitations/Restrictions None   Exercise Prescription   Mode Recumbant bike;Ambulation;Weights;Other (see comments)  ([facility])   Duration/Time 30-45 min   Frequency 2 days/week   THR (85% of age predicted max HR) 118.15   OMNI Effort Rating (0-10 Scale) 4-6/10   Progression Continuous bouts;Total exercise time of 30-45 minutes;Progress peak intensity by 1/4 MET per week;Aerobic exercise to OMNI rating of 6 or below and at or below THR   Recommended Home  Exercise   Type of Exercise Walking;Other (comments);Health club;Weights  ([employer])   Frequency (days per week) 4-5   Duration (minutes per session) 45-60 min aerobic exercise   Effort Rating Recommended 4-6/10   30 Day Exercise Plan Pt. was encouraged to begin an at home walking program starting with 5-10 minutes and gradually increase in duration as tolerated. 7/14/17 Pt is progressing well with home wqalking program and plans to continue to progress in sessions and at home.   Current Home Exercise   Type of Exercise Walking   Frequency (days per week) 5-7   Duration (minutes per session) 25-40   Follow-up/On-going Support   Provider follow-up needed on the following No follow-up needed   Learning Assessment   Learner Patient   Primary Language English   Preferred Learning Style Listening;Reading;Demonstration;Pictures/Video   Barriers to Learning No barriers noted   Patient Education   Education recommended Anatomy and Physiology of the Heart;Blood Pressure;Exercise Principles;Medication Overview;Nutrition;Stress Management   Education Comments 7/14/17 Pt doesnt feel any need for education classes.

## 2017-08-28 NOTE — OP NOTE
PREOPERATIVE DIAGNOSIS:   1:  Visually significant cataract, Right eye   2:  Astigmatism (anticipated residual astigmatism after cataract surgery).  POSTOPERATIVE DIAGNOSIS: Same   PROCEDURES:   1. Femtosecond laser corneal and lens treatment Right eye.  2.  Cataract extraction with intraocular lens implant Right eye.  SURGEON: Dennis Benz MD   INDICATIONS: The patient Frannie Roa presented to the eye clinic with decreased vision secondary to cataract in the Right eye. The risks, benefits and alternatives to cataract extraction with femtosecond laser assist were discussed.  Laser-assisted cataract surgery was performed at the patient's discretion in an attempt to reduce anticipated post-operative astigmatism based on pre-operative keratometry.  The patient elected to proceed. All questions were answered to the patient's satisfaction.   DESCRIPTION OF PROCEDURE: Femtosecond laser treatment including astigmatic keratotomy was perfomed in the laser suite.  The patient was brought to the operating suite and the Right eye was prepped and draped in a sterile fashion.  A paracentesis was created using the Supersharp blade.  1% non-preserved Lidocaine was then injected into the anterior chamber, followed by viscoelastic. A temporal clear corneal wound was created using the keratome. The anterior capsulorrhexis was completed using the Utrata forceps. Hydrodissection was performed, and the nucleus was rotated. The phacoemulsification handpiece was introduced, and the nucleus was grooved and subsequently cracked. The nucleus was rotated and chopped into smaller segments using the Francisco chopper. These segments were emulsified and aspirated from the eye. The remaining lens cortex was removed using irrigation-aspiration. The capsular bag was filled with viscoelastic, and an intraocular lens was injected into the capsular bag (see below). Remaining viscoelastic was removed using irrigation-aspiration. The anterior  chamber was instilled with balanced salt solution and found to be watertight.  Arcuate femtosecond laser incisions were opened using the Sinskey hook.  0.1cc Cefuroxime was then injected via the paracentesis.  Betadine followed by Durezol drops were applied to the ocular surface which was then covered with a shield. The patient was transferred to the Post Anesthesia Care Unit in satisfactory condition.   PLAN: The patient will be discharged to home and will follow up tomorrow morning in the eye clinic.  EBL:  None  Complications:  None  Specimens:  None  Findings:  Cataract  * No implants in log *  CC:  Dennis Benz MD - [facility]

## 2017-09-01 ENCOUNTER — OFFICE VISIT (OUTPATIENT)
Dept: INTERNAL MEDICINE | Facility: CLINIC | Age: 82
End: 2017-09-01
Payer: MEDICARE

## 2017-09-01 VITALS
HEIGHT: 62 IN | WEIGHT: 121 LBS | SYSTOLIC BLOOD PRESSURE: 96 MMHG | BODY MASS INDEX: 22.26 KG/M2 | DIASTOLIC BLOOD PRESSURE: 58 MMHG | HEART RATE: 74 BPM | OXYGEN SATURATION: 95 % | TEMPERATURE: 97.8 F

## 2017-09-01 DIAGNOSIS — I34.0 MYXOMATOUS MITRAL VALVE REGURGITATION: ICD-10-CM

## 2017-09-01 DIAGNOSIS — Z00.00 ROUTINE HISTORY AND PHYSICAL EXAMINATION OF ADULT: Primary | ICD-10-CM

## 2017-09-01 PROCEDURE — G0439 PPPS, SUBSEQ VISIT: HCPCS | Performed by: FAMILY MEDICINE

## 2017-09-01 NOTE — PATIENT INSTRUCTIONS
Before Your Surgery      Call your surgeon if there is any change in your health. This includes signs of a cold or flu (such as a sore throat, runny nose, cough, rash or fever).    Do not smoke, drink alcohol or take over the counter medicine (unless your surgeon or primary care doctor tells you to) for the 24 hours before and after surgery.    If you take prescribed drugs: Follow your doctor s orders about which medicines to take and which to stop until after surgery.    Eating and drinking prior to surgery: follow the instructions from your surgeon    Take a shower or bath the night before surgery. Use the soap your surgeon gave you to gently clean your skin. If you do not have soap from your surgeon, use your regular soap. Do not shave or scrub the surgery site.  Wear clean pajamas and have clean sheets on your bed.   Preventive Health Recommendations    Female Ages 65 +    Yearly exam:   See your health care provider every year in order to  Review health changes.   Discuss preventive care.    Review your medicines if your doctor has prescribed any.    You no longer need a yearly Pap test unless you've had an abnormal Pap test in the past 10 years. If you have vaginal symptoms, such as bleeding or discharge, be sure to talk with your provider about a Pap test.    Every 1 to 2 years, have a mammogram.  If you are over 69, talk with your health care provider about whether or not you want to continue having screening mammograms.    Every 10 years, have a colonoscopy. Or, have a yearly FIT test (stool test). These exams will check for colon cancer.     Have a cholesterol test every 5 years, or more often if your doctor advises it.     Have a diabetes test (fasting glucose) every three years. If you are at risk for diabetes, you should have this test more often.     At age 65, have a bone density scan (DEXA) to check for osteoporosis (brittle bone disease).    Shots:  Get a flu shot each year.  Get a tetanus shot  every 10 years.  Talk to your doctor about your pneumonia vaccines. There are now two you should receive - Pneumovax (PPSV 23) and Prevnar (PCV 13).  Talk to your doctor about the shingles vaccine.  Talk to your doctor about the hepatitis B vaccine.    Nutrition:   Eat at least 5 servings of fruits and vegetables each day.    Eat whole-grain bread, whole-wheat pasta and brown rice instead of white grains and rice.    Talk to your provider about Calcium and Vitamin D.     Lifestyle  Exercise at least 150 minutes a week (30 minutes a day, 5 days a week). This will help you control your weight and prevent disease.    Limit alcohol to one drink per day.    No smoking.     Wear sunscreen to prevent skin cancer.     See your dentist twice a year for an exam and cleaning.    See your eye doctor every 1 to 2 years to screen for conditions such as glaucoma, macular degeneration and cataracts.

## 2017-09-01 NOTE — MR AVS SNAPSHOT
After Visit Summary   9/1/2017    Frannie Roa    MRN: [id number]           Patient Information     Date Of Birth          1935        Visit Information        Provider Department      9/1/2017 8:00 AM Clinton Thomas MD [facility]        Today's Diagnoses     Routine history and physical examination of adult    -  1    Myxomatous mitral valve regurgitation          Care Instructions      Before Your Surgery      Call your surgeon if there is any change in your health. This includes signs of a cold or flu (such as a sore throat, runny nose, cough, rash or fever).    Do not smoke, drink alcohol or take over the counter medicine (unless your surgeon or primary care doctor tells you to) for the 24 hours before and after surgery.    If you take prescribed drugs: Follow your doctor s orders about which medicines to take and which to stop until after surgery.    Eating and drinking prior to surgery: follow the instructions from your surgeon    Take a shower or bath the night before surgery. Use the soap your surgeon gave you to gently clean your skin. If you do not have soap from your surgeon, use your regular soap. Do not shave or scrub the surgery site.  Wear clean pajamas and have clean sheets on your bed.   Preventive Health Recommendations    Female Ages 65 +    Yearly exam:   See your health care provider every year in order to  Review health changes.   Discuss preventive care.    Review your medicines if your doctor has prescribed any.    You no longer need a yearly Pap test unless you've had an abnormal Pap test in the past 10 years. If you have vaginal symptoms, such as bleeding or discharge, be sure to talk with your provider about a Pap test.    Every 1 to 2 years, have a mammogram.  If you are over 69, talk with your health care provider about whether or not you want to continue having screening mammograms.    Every 10 years, have a colonoscopy. Or, have a yearly  FIT test (stool test). These exams will check for colon cancer.     Have a cholesterol test every 5 years, or more often if your doctor advises it.     Have a diabetes test (fasting glucose) every three years. If you are at risk for diabetes, you should have this test more often.     At age 65, have a bone density scan (DEXA) to check for osteoporosis (brittle bone disease).    Shots:  Get a flu shot each year.  Get a tetanus shot every 10 years.  Talk to your doctor about your pneumonia vaccines. There are now two you should receive - Pneumovax (PPSV 23) and Prevnar (PCV 13).  Talk to your doctor about the shingles vaccine.  Talk to your doctor about the hepatitis B vaccine.    Nutrition:   Eat at least 5 servings of fruits and vegetables each day.    Eat whole-grain bread, whole-wheat pasta and brown rice instead of white grains and rice.    Talk to your provider about Calcium and Vitamin D.     Lifestyle  Exercise at least 150 minutes a week (30 minutes a day, 5 days a week). This will help you control your weight and prevent disease.    Limit alcohol to one drink per day.    No smoking.     Wear sunscreen to prevent skin cancer.     See your dentist twice a year for an exam and cleaning.    See your eye doctor every 1 to 2 years to screen for conditions such as glaucoma, macular degeneration and cataracts.          Follow-ups after your visit        Who to contact     If you have questions or need follow up information about today's clinic visit or your schedule please contact [facility] directly at [phone].  Normal or non-critical lab and imaging results will be communicated to you by MyChart, letter or phone within 4 business days after the clinic has received the results. If you do not hear from us within 7 days, please contact the clinic through MyChart or phone. If you have a critical or abnormal lab result, we will notify you by phone as soon as possible.  Submit refill requests  "through [employer] or call your pharmacy and they will forward the refill request to us. Please allow 3 business days for your refill to be completed.          Additional Information About Your Visit        [employer] Information     [employer] lets you send messages to your doctor, view your test results, renew your prescriptions, schedule appointments and more. To sign up, go to [email or web address] . Click on \"Log in\" on the left side of the screen, which will take you to the Welcome page. Then click on \"Sign up Now\" on the right side of the page.     You will be asked to enter the access code listed below, as well as some personal information. Please follow the directions to create your username and password.     Your access code is: [email or web address]  Expires: 2017  7:56 AM     Your access code will  in 90 days. If you need help or a new code, please call your [facility] clinic or [phone].        Care EveryWhere ID     This is your Care EveryWhere ID. This could be used by other organizations to access your [facility] medical records  [id number]        Your Vitals Were     Pulse Temperature Height Pulse Oximetry BMI (Body Mass Index)       74 97.8  F (36.6  C) (Oral) 5' 2\" (1.575 m) 95% 22.13 kg/m2        Blood Pressure from Last 3 Encounters:   17 96/58   17 113/67   17 119/59    Weight from Last 3 Encounters:   17 121 lb (54.9 kg)   17 122 lb (55.3 kg)   17 123 lb (55.8 kg)              Today, you had the following     No orders found for display       Primary Care Provider Office Phone # Fax #    Romelia Parker [phone]       [address] [facility] [address]  [facility] [address]        Equal Access to Services     YAQUELIN PARRA : Juancho Saldivar, wavida luqadaha, qaybta kaalrama shirley. [facility] [phone].    ATENCIÓN: Si habla chaceañol, tiene a thomas disposición servicios gratuitos de " asistencia lingüística. Esvin al [phone].    We comply with applicable federal civil rights laws and [place] laws. We do not discriminate on the basis of race, color, national origin, age, disability sex, sexual orientation or gender identity.            Thank you!     Thank you for choosing [facility]  for your care. Our goal is always to provide you with excellent care. Hearing back from our patients is one way we can continue to improve our services. Please take a few minutes to complete the written survey that you may receive in the mail after your visit with us. Thank you!             Your Updated Medication List - Protect others around you: Learn how to safely use, store and throw away your medicines at www.disposemymeds.org.          This list is accurate as of: 9/1/17 11:59 PM.  Always use your most recent med list.                   Brand Name Dispense Instructions for use Diagnosis    apixaban ANTICOAGULANT 2.5 MG tablet    ELIQUIS    180 tablet    Take 1 tablet (2.5 mg) by mouth 2 times daily        ASPIRIN PO      Take 81 mg by mouth daily        dorzolamide-timolol 2-0.5 % ophthalmic solution    COSOPT     Place 1 drop Into the left eye 2 times daily        metoprolol 25 MG 24 hr tablet    TOPROL-XL    45 tablet    Take 0.5 tablets (12.5 mg) by mouth daily    Mitral valve insufficiency, unspecified etiology       travoprost Z (benzalkonium) 0.004 % ophthalmic solution    TRAVATAN Z     Place 1 drop into both eyes every evening        VIGAMOX 0.5 % ophthalmic solution   Generic drug:  moxifloxacin           zinc sulfate 220 (50 ZN) MG capsule    ZINCATE     Take 220 mg by mouth 2 times daily

## 2017-09-01 NOTE — PROGRESS NOTES
SUBJECTIVE:   Frannie Roa is a 81 year old female who presents for Preventive Visit.      Are you in the first 12 months of your Medicare Part B coverage?  No    Healthy Habits:    Do you get at least three servings of calcium containing foods daily (dairy, green leafy vegetables, etc.)? yes    Amount of exercise or daily activities, outside of work: 5 day(s) per week    Problems taking medications regularly No    Medication side effects: No    Have you had an eye exam in the past two years? yes    Do you see a dentist twice per year? yes    Do you have sleep apnea, excessive snoring or daytime drowsiness?no    COGNITIVE SCREEN  1) Repeat 3 items (Banana, Sunrise, Chair)    2) Clock draw: NORMAL  3) 3 item recall: Recalls 3 objects  Results: 3 items recalled: COGNITIVE IMPAIRMENT LESS LIKELY    Mini-CogTM Copyright S Heriberto. Licensed by the author for use in [employer]; reprinted with permission (romulo@[facility]). All rights reserved.        Patient Active Problem List   Diagnosis     Osteoporosis     Systolic murmur     Myxomatous mitral valve regurgitation     SVT (supraventricular tachycardia) (H)     Hyperlipidemia     Advance Care Planning     Chronic systolic congestive heart failure (H)     Long-term (current) use of anticoagulants [Z79.01]     Atrial fibrillation (H) [I48.91]     Status post coronary angiogram     Mitral regurgitation       Current Outpatient Prescriptions   Medication Sig Dispense Refill     VIGAMOX 0.5 % ophthalmic solution        apixaban ANTICOAGULANT (ELIQUIS) 2.5 MG tablet Take 1 tablet (2.5 mg) by mouth 2 times daily 180 tablet 3     metoprolol (TOPROL-XL) 25 MG 24 hr tablet Take 0.5 tablets (12.5 mg) by mouth daily 45 tablet 3     ASPIRIN PO Take 81 mg by mouth daily       zinc sulfate (ZINCATE) 220 MG capsule Take 220 mg by mouth 2 times daily       dorzolamide-timolol (COSOPT) 2-0.5 % ophthalmic solution Place 1 drop Into the left eye 2 times daily         travoprost Z, benzalkonium, (TRAVATAN Z) 0.004 % ophthalmic solution Place 1 drop into both eyes every evening            She needed forms for a Senior Living Facility.    She is functioning normally.    She had a very successful mitral clip procedure.        Reviewed and updated as needed this visit by clinical staffTobacco  Allergies  Meds  Med Hx  Surg Hx  Fam Hx  Soc Hx        Reviewed and updated as needed this visit by Provider  Allergies        Social History   Substance Use Topics     Smoking status: Never Smoker     Smokeless tobacco: Never Used     Alcohol use 0.0 oz/week     0 Standard drinks or equivalent per week      Comment: 3 glasses wine/week       The patient does not drink >3 drinks per day nor >7 drinks per week.    Today's PHQ-2 Score:   PHQ-2 ( 1999 Pfizer) 9/1/2017 7/13/2017   Q1: Little interest or pleasure in doing things 0 0   Q2: Feeling down, depressed or hopeless 0 0   PHQ-2 Score 0 0         Do you feel safe in your environment - Yes    Do you have a Health Care Directive?: Yes: Advance Directive has been received and scanned.    Current providers sharing in care for this patient include: Patient Care Team:  Romelia Parker MD as PCP - General (Internal Medicine)  Debora Whalen APRN CNS as Nurse Coordinator (Clinical Nurse Specialist)  Asim Altamirano MD as MD (Cardiology)  Amee Wallace as Nurse Coordinator (Cardiology)  Mary Nelson EP as Cardiac Rehabilitation Therapist  Shiva Hammonds, RN as Nurse Coordinator  Page James, RN as Nurse Coordinator      Hearing impairment: No    Ability to successfully perform activities of daily living: Yes, no assistance needed     Fall risk:  Fallen 2 or more times in the past year?: No  Any fall with injury in the past year?: No      Home safety:  none identified  click delete button to remove this line now    The following health maintenance items are reviewed in Epic and correct as of today:Health  "Maintenance   Topic Date Due     FALL RISK ASSESSMENT  03/31/2017     INFLUENZA VACCINE (SYSTEM ASSIGNED)  09/01/2017     PNEUMOCOCCAL (2 of 2 - PPSV23) 05/16/2018 (Originally 4/27/2016)     TETANUS IMMUNIZATION (SYSTEM ASSIGNED)  03/31/2018     ADVANCE DIRECTIVE PLANNING Q5 YRS  08/21/2022     DEXA SCAN SCREENING (SYSTEM ASSIGNED)  Completed         Pneumonia Vaccine: received 2    ROS:  Constitutional, HEENT, cardiovascular, pulmonary, GI, , musculoskeletal, neuro, skin, endocrine and psych systems are negative, except as otherwise noted.      OBJECTIVE:   BP 96/58  Pulse 74  Temp 97.8  F (36.6  C) (Oral)  Ht 5' 2\" (1.575 m)  Wt 121 lb (54.9 kg)  SpO2 95%  BMI 22.13 kg/m2 Estimated body mass index is 22.13 kg/(m^2) as calculated from the following:    Height as of this encounter: 5' 2\" (1.575 m).    Weight as of this encounter: 121 lb (54.9 kg).  EXAM:   GENERAL APPEARANCE: healthy, alert and no distress  EYES: Eyes grossly normal to inspection, PERRL and conjunctivae and sclerae normal  HENT: ear canals and TM's normal, nose and mouth without ulcers or lesions, oropharynx clear and oral mucous membranes moist  NECK: no adenopathy, no asymmetry, masses, or scars and thyroid normal to palpation  RESP: lungs clear to auscultation - no rales, rhonchi or wheezes  CV: regular rates and rhythm, no peripheral edema and 2/6 NINOSKA LSB  ABDOMEN: soft, nontender, no hepatosplenomegaly, no masses   MS: no musculoskeletal defects are noted and gait is age appropriate without ataxia  SKIN: no suspicious lesions or rashes  NEURO: Normal strength and tone, sensory exam grossly normal, mentation intact and speech normal  PSYCH: mentation appears normal and affect normal/bright    ASSESSMENT / PLAN:     (Z00.00) Routine history and physical examination of adult  (primary encounter diagnosis)  Comment:   Plan:     (I34.0) Myxomatous mitral valve regurgitation  Comment: repaired  Plan:       End of Life Planning:  Patient " "currently has an advanced directive: Yes.  Practitioner is supportive of decision.    COUNSELING:  Reviewed preventive health counseling, as reflected in patient instructions       Regular exercise       Healthy diet/nutrition        Estimated body mass index is 22.13 kg/(m^2) as calculated from the following:    Height as of this encounter: 5' 2\" (1.575 m).    Weight as of this encounter: 121 lb (54.9 kg).     reports that she has never smoked. She has never used smokeless tobacco.        Appropriate preventive services were discussed with this patient, including applicable screening as appropriate for cardiovascular disease, diabetes, osteopenia/osteoporosis, and glaucoma.  As appropriate for age/gender, discussed screening for colorectal cancer, prostate cancer, breast cancer, and cervical cancer. Checklist reviewing preventive services available has been given to the patient.    Reviewed patients plan of care and provided an AVS. The Basic Care Plan (routine screening as documented in Health Maintenance) for Frannie meets the Care Plan requirement. This Care Plan has been established and reviewed with the Patient.    Counseling Resources:  ATP IV Guidelines  Pooled Cohorts Equation Calculator  Breast Cancer Risk Calculator  FRAX Risk Assessment  ICSI Preventive Guidelines  Dietary Guidelines for Americans, 2010  [employer]'s MyPlate  ASA Prophylaxis  Lung CA Screening    Clinton Thomas MD  [facility]  "

## 2017-09-28 DIAGNOSIS — Z95.818 S/P MITRAL VALVE CLIP IMPLANTATION: Primary | ICD-10-CM

## 2017-09-28 DIAGNOSIS — Z98.890 S/P MITRAL VALVE CLIP IMPLANTATION: Primary | ICD-10-CM

## 2017-09-29 ENCOUNTER — DOCUMENTATION ONLY (OUTPATIENT)
Dept: OTHER | Facility: CLINIC | Age: 82
End: 2017-09-29

## 2017-12-26 ENCOUNTER — TELEPHONE (OUTPATIENT)
Dept: CARDIOLOGY | Facility: CLINIC | Age: 82
End: 2017-12-26

## 2017-12-26 NOTE — TELEPHONE ENCOUNTER
Called pharmacy and pharmacist states patient p/u 35 pills at a different pharmacy on 12/14/2017. Pharmacist states no pa is needed and that  The patient is in the gap ($158.00 @ 30 day supply). Pharmacist states patient should have enough eliquis to last her until the new year and recommends she waits until 1/1/18 to refill because she will be out of the gap and her copay will not be expensive as it is.

## 2017-12-29 ENCOUNTER — CARE COORDINATION (OUTPATIENT)
Dept: CARDIOLOGY | Facility: CLINIC | Age: 82
End: 2017-12-29

## 2017-12-29 NOTE — PROGRESS NOTES
"Pharmacy states that PA not needed for Eliquis.  Just awaiting 1st of the year to refill per \"gap\" in coverage.  Patient was called and notified.  Patient will get refill after the 1st, if any problems she will contact us.  "

## 2017-12-29 NOTE — PROGRESS NOTES
"PT called regarding prior auth for eliquis.   Read the comments by pharmacy in chart- explained sometimes the prior auth cannot be submitted until after the first of the year.   Pt asked to speak to Dr Alireza Shannon RN- stated \"he will know what to do\"    Again explained scripts to new insurance need to be sent after 1/1/18    Routed msg to RNCC    req c/b to [phone]  "

## 2018-04-13 DIAGNOSIS — I34.0 MYXOMATOUS MITRAL VALVE REGURGITATION: Primary | ICD-10-CM

## 2018-04-24 ENCOUNTER — OFFICE VISIT (OUTPATIENT)
Dept: CARDIOLOGY | Facility: CLINIC | Age: 83
End: 2018-04-24
Attending: NURSE PRACTITIONER
Payer: MEDICARE

## 2018-04-24 ENCOUNTER — RADIANT APPOINTMENT (OUTPATIENT)
Dept: CARDIOLOGY | Facility: CLINIC | Age: 83
End: 2018-04-24
Payer: MEDICARE

## 2018-04-24 ENCOUNTER — OFFICE VISIT (OUTPATIENT)
Dept: CARDIOLOGY | Facility: CLINIC | Age: 83
End: 2018-04-24
Attending: INTERNAL MEDICINE
Payer: MEDICARE

## 2018-04-24 VITALS
WEIGHT: 121 LBS | OXYGEN SATURATION: 100 % | DIASTOLIC BLOOD PRESSURE: 74 MMHG | BODY MASS INDEX: 21.44 KG/M2 | HEIGHT: 63 IN | SYSTOLIC BLOOD PRESSURE: 110 MMHG | HEART RATE: 90 BPM

## 2018-04-24 VITALS
WEIGHT: 121 LBS | DIASTOLIC BLOOD PRESSURE: 74 MMHG | BODY MASS INDEX: 21.44 KG/M2 | OXYGEN SATURATION: 100 % | SYSTOLIC BLOOD PRESSURE: 110 MMHG | HEIGHT: 63 IN | HEART RATE: 90 BPM

## 2018-04-24 DIAGNOSIS — I34.0 MYXOMATOUS MITRAL VALVE REGURGITATION: ICD-10-CM

## 2018-04-24 DIAGNOSIS — I34.0 MYXOMATOUS MITRAL VALVE REGURGITATION: Primary | ICD-10-CM

## 2018-04-24 DIAGNOSIS — Z95.818 S/P MITRAL VALVE CLIP IMPLANTATION: ICD-10-CM

## 2018-04-24 DIAGNOSIS — I34.0 MITRAL VALVE INSUFFICIENCY, UNSPECIFIED ETIOLOGY: ICD-10-CM

## 2018-04-24 DIAGNOSIS — R01.1 SYSTOLIC MURMUR: ICD-10-CM

## 2018-04-24 DIAGNOSIS — Z98.890 S/P MITRAL VALVE CLIP IMPLANTATION: ICD-10-CM

## 2018-04-24 LAB
6 MIN WALK (FT): 1350 FT
6 MIN WALK (M): 411 M
ANION GAP SERPL CALCULATED.3IONS-SCNC: 4 MMOL/L (ref 3–14)
BUN SERPL-MCNC: 13 MG/DL (ref 7–30)
CALCIUM SERPL-MCNC: 9 MG/DL (ref 8.5–10.1)
CHLORIDE SERPL-SCNC: 104 MMOL/L (ref 94–109)
CO2 SERPL-SCNC: 31 MMOL/L (ref 20–32)
CREAT SERPL-MCNC: 0.64 MG/DL (ref 0.52–1.04)
ERYTHROCYTE [DISTWIDTH] IN BLOOD BY AUTOMATED COUNT: 14.3 % (ref 10–15)
GFR SERPL CREATININE-BSD FRML MDRD: 89 ML/MIN/1.7M2
GLUCOSE SERPL-MCNC: 94 MG/DL (ref 70–99)
HCT VFR BLD AUTO: 38.7 % (ref 35–47)
HGB BLD-MCNC: 12.5 G/DL (ref 11.7–15.7)
MCH RBC QN AUTO: 29.1 PG (ref 26.5–33)
MCHC RBC AUTO-ENTMCNC: 32.3 G/DL (ref 31.5–36.5)
MCV RBC AUTO: 90 FL (ref 78–100)
PLATELET # BLD AUTO: 344 10E9/L (ref 150–450)
POTASSIUM SERPL-SCNC: 4.3 MMOL/L (ref 3.4–5.3)
RBC # BLD AUTO: 4.3 10E12/L (ref 3.8–5.2)
SODIUM SERPL-SCNC: 139 MMOL/L (ref 133–144)
WBC # BLD AUTO: 6.6 10E9/L (ref 4–11)

## 2018-04-24 PROCEDURE — 36415 COLL VENOUS BLD VENIPUNCTURE: CPT | Performed by: NURSE PRACTITIONER

## 2018-04-24 PROCEDURE — G0463 HOSPITAL OUTPT CLINIC VISIT: HCPCS | Mod: ZF

## 2018-04-24 PROCEDURE — 80048 BASIC METABOLIC PNL TOTAL CA: CPT | Performed by: NURSE PRACTITIONER

## 2018-04-24 PROCEDURE — 99214 OFFICE O/P EST MOD 30 MIN: CPT | Mod: ZP | Performed by: NURSE PRACTITIONER

## 2018-04-24 PROCEDURE — 85027 COMPLETE CBC AUTOMATED: CPT | Performed by: NURSE PRACTITIONER

## 2018-04-24 ASSESSMENT — PAIN SCALES - GENERAL
PAINLEVEL: NO PAIN (0)
PAINLEVEL: NO PAIN (0)

## 2018-04-24 NOTE — PATIENT INSTRUCTIONS
Patient Instructions:    It was a pleasure to see you in the cardiology clinic today.    If you have any questions you can reach our nurse triage line at [phone].  Press Option #1 for the [facility], then press Option #3 for nursing or Option #1 for scheduling. We also encourage the use of [email or web address] to communicate with your HealthCare Provider    Note new medications: None.  Stop the following medications: None.    The results from today include: Echocardiogram is pending. I will call you with results.     Please follow up with Dr. Altamirano as recommended.    Sincerely,    Miriam BORREGO, FNP-BC

## 2018-04-24 NOTE — LETTER
"4/24/2018      RE: Frannie Estrada  [address]  [facility] [address]       Dear Colleague,    Thank you for the opportunity to participate in the care of your patient, Frannie Estrada, at the [facility]. Please see a copy of my visit note below.    Asim Altamirano M.D.  Cardiovascular Medicine    I personally saw and examined this patient, discussed care with housestaff and other consultants, reviewed current laboratories and imaging studies, and conveyed impression and diagnostic/therapeutic plan to patient.    Problem List  1. Mitral regurgitation  2. MitraClip  3. Paroxysmal atrial fibrillation  4. Pulmonary venous hypertension/resolved  History    Patient returns for follow-up.  There is no interim history of PND, orthopnea, ankle edema, palpitation, pre-syncope.  She is functional class I for her age.  She has had no bleeding abnormalities.    Objective  /74 (BP Location: Right arm, Patient Position: Chair, Cuff Size: Adult Regular)  Pulse 90  Ht 1.588 m (5' 2.5\")  Wt 54.9 kg (121 lb)  SpO2 100%  BMI 21.78 kg/m2    Wt Readings from Last 5 Encounters:   04/24/18 54.9 kg (121 lb)   04/24/18 54.9 kg (121 lb)   09/01/17 54.9 kg (121 lb)   08/28/17 55.3 kg (122 lb)   08/22/17 55.8 kg (123 lb)       Meds  Current Outpatient Prescriptions   Medication     apixaban ANTICOAGULANT (ELIQUIS) 2.5 MG tablet     ASPIRIN PO     dorzolamide-timolol (COSOPT) 2-0.5 % ophthalmic solution     metoprolol (TOPROL-XL) 25 MG 24 hr tablet     travoprost Z, benzalkonium, (TRAVATAN Z) 0.004 % ophthalmic solution     zinc sulfate (ZINCATE) 220 MG capsule     VIGAMOX 0.5 % ophthalmic solution     No current facility-administered medications for this visit.        Labs  Results for FRANNIE ESTRADA (MRN [id number]) as of 4/24/2018 14:15   Ref. Range 7/14/2017 08:49 4/24/2018 00:00 4/24/2018 09:50 4/24/2018 10:42 4/24/2018 12:23   Sodium Latest Ref " Range: 133 - 144 mmol/L 141  139     Potassium Latest Ref Range: 3.4 - 5.3 mmol/L 4.4  4.3     Chloride Latest Ref Range: 94 - 109 mmol/L 105  104     Carbon Dioxide Latest Ref Range: 20 - 32 mmol/L 29  31     Urea Nitrogen Latest Ref Range: 7 - 30 mg/dL 15  13     Creatinine Latest Ref Range: 0.52 - 1.04 mg/dL 0.69  0.64     GFR Estimate Latest Ref Range: >60 mL/min/1.7m2 81  89     GFR Estimate If Black Latest Ref Range: >60 mL/min/1.7m2 >90...  >90     Calcium Latest Ref Range: 8.5 - 10.1 mg/dL 8.7  9.0     Anion Gap Latest Ref Range: 3 - 14 mmol/L 7  4     Albumin Latest Ref Range: 3.4 - 5.0 g/dL 3.7       Protein Total Latest Ref Range: 6.8 - 8.8 g/dL 7.0       Bilirubin Total Latest Ref Range: 0.2 - 1.3 mg/dL 0.5       Alkaline Phosphatase Latest Ref Range: 40 - 150 U/L 79       ALT Latest Ref Range: 0 - 50 U/L 32       AST Latest Ref Range: 0 - 45 U/L 33       Cholesterol Latest Ref Range: <200 mg/dL 253 (H)       HDL Cholesterol Latest Ref Range: >49 mg/dL 88       LDL Cholesterol Calculated Latest Ref Range: <100 mg/dL 154 (H)       Non HDL Cholesterol Latest Ref Range: <130 mg/dL 165 (H)       Triglycerides Latest Ref Range: <150 mg/dL 54       Glucose Latest Ref Range: 70 - 99 mg/dL 100 (H)  94     WBC Latest Ref Range: 4.0 - 11.0 10e9/L   6.6     Hemoglobin Latest Ref Range: 11.7 - 15.7 g/dL   12.5     Hematocrit Latest Ref Range: 35.0 - 47.0 %   38.7     Platelet Count Latest Ref Range: 150 - 450 10e9/L   344     RBC Count Latest Ref Range: 3.8 - 5.2 10e12/L   4.30     MCV Latest Ref Range: 78 - 100 fl   90     MCH Latest Ref Range: 26.5 - 33.0 pg   29.1     MCHC Latest Ref Range: 31.5 - 36.5 g/dL   32.3     RDW Latest Ref Range: 10.0 - 15.0 %   14.3         Imaging     Assessment/Plan     Sincerely,     Asim Altamirano MD

## 2018-04-24 NOTE — NURSING NOTE
Chief Complaint   Patient presents with     Follow Up For      6 month f/u         Vitals were taken and medications were reconciled. EKG was performed.    Grace Ram MA    1:28 PM

## 2018-04-24 NOTE — PROGRESS NOTES
"Asim Altamirano M.D.  Cardiovascular Medicine    I personally saw and examined this patient, discussed care with housestaff and other consultants, reviewed current laboratories and imaging studies, and conveyed impression and diagnostic/therapeutic plan to patient.    Problem List  1. Mitral regurgitation  2. MitraClip  3. Paroxysmal atrial fibrillation  4. Pulmonary venous hypertension/resolved  History    Patient returns for follow-up.  There is no interim history of PND, orthopnea, ankle edema, palpitation, pre-syncope.  She is functional class I for her age.  She has had no bleeding abnormalities.    Objective  /74 (BP Location: Right arm, Patient Position: Chair, Cuff Size: Adult Regular)  Pulse 90  Ht 1.588 m (5' 2.5\")  Wt 54.9 kg (121 lb)  SpO2 100%  BMI 21.78 kg/m2    Wt Readings from Last 5 Encounters:   04/24/18 54.9 kg (121 lb)   04/24/18 54.9 kg (121 lb)   09/01/17 54.9 kg (121 lb)   08/28/17 55.3 kg (122 lb)   08/22/17 55.8 kg (123 lb)       Meds  Current Outpatient Prescriptions   Medication     apixaban ANTICOAGULANT (ELIQUIS) 2.5 MG tablet     ASPIRIN PO     dorzolamide-timolol (COSOPT) 2-0.5 % ophthalmic solution     metoprolol (TOPROL-XL) 25 MG 24 hr tablet     travoprost Z, benzalkonium, (TRAVATAN Z) 0.004 % ophthalmic solution     zinc sulfate (ZINCATE) 220 MG capsule     VIGAMOX 0.5 % ophthalmic solution     No current facility-administered medications for this visit.        Labs  Results for NATALIE WILL G (MRN [id number]) as of 4/24/2018 14:15   Ref. Range 7/14/2017 08:49 4/24/2018 00:00 4/24/2018 09:50 4/24/2018 10:42 4/24/2018 12:23   Sodium Latest Ref Range: 133 - 144 mmol/L 141  139     Potassium Latest Ref Range: 3.4 - 5.3 mmol/L 4.4  4.3     Chloride Latest Ref Range: 94 - 109 mmol/L 105  104     Carbon Dioxide Latest Ref Range: 20 - 32 mmol/L 29  31     Urea Nitrogen Latest Ref Range: 7 - 30 mg/dL 15  13     Creatinine Latest Ref Range: 0.52 - 1.04 mg/dL 0.69  0.64   "   GFR Estimate Latest Ref Range: >60 mL/min/1.7m2 81  89     GFR Estimate If Black Latest Ref Range: >60 mL/min/1.7m2 >90...  >90     Calcium Latest Ref Range: 8.5 - 10.1 mg/dL 8.7  9.0     Anion Gap Latest Ref Range: 3 - 14 mmol/L 7  4     Albumin Latest Ref Range: 3.4 - 5.0 g/dL 3.7       Protein Total Latest Ref Range: 6.8 - 8.8 g/dL 7.0       Bilirubin Total Latest Ref Range: 0.2 - 1.3 mg/dL 0.5       Alkaline Phosphatase Latest Ref Range: 40 - 150 U/L 79       ALT Latest Ref Range: 0 - 50 U/L 32       AST Latest Ref Range: 0 - 45 U/L 33       Cholesterol Latest Ref Range: <200 mg/dL 253 (H)       HDL Cholesterol Latest Ref Range: >49 mg/dL 88       LDL Cholesterol Calculated Latest Ref Range: <100 mg/dL 154 (H)       Non HDL Cholesterol Latest Ref Range: <130 mg/dL 165 (H)       Triglycerides Latest Ref Range: <150 mg/dL 54       Glucose Latest Ref Range: 70 - 99 mg/dL 100 (H)  94     WBC Latest Ref Range: 4.0 - 11.0 10e9/L   6.6     Hemoglobin Latest Ref Range: 11.7 - 15.7 g/dL   12.5     Hematocrit Latest Ref Range: 35.0 - 47.0 %   38.7     Platelet Count Latest Ref Range: 150 - 450 10e9/L   344     RBC Count Latest Ref Range: 3.8 - 5.2 10e12/L   4.30     MCV Latest Ref Range: 78 - 100 fl   90     MCH Latest Ref Range: 26.5 - 33.0 pg   29.1     MCHC Latest Ref Range: 31.5 - 36.5 g/dL   32.3     RDW Latest Ref Range: 10.0 - 15.0 %   14.3         Imaging     Assessment/Plan

## 2018-04-24 NOTE — MR AVS SNAPSHOT
After Visit Summary   4/24/2018    Frannie Roa    MRN: [id number]           Patient Information     Date Of Birth          1935        Visit Information        Provider Department      4/24/2018 1:30 PM Miriam Rosen APRN [facility]        Today's Diagnoses     Myxomatous mitral valve regurgitation    -  1    S/P mitral valve clip implantation        Mitral valve insufficiency, unspecified etiology        Systolic murmur          Care Instructions    Patient Instructions:    It was a pleasure to see you in the cardiology clinic today.    If you have any questions you can reach our nurse triage line at [phone].  Press Option #1 for the [facility], then press Option #3 for nursing or Option #1 for scheduling. We also encourage the use of [employer] to communicate with your HealthCare Provider    Note new medications: None.  Stop the following medications: None.    The results from today include: Echocardiogram is pending. I will call you with results.     Please follow up with Dr. Altamirano as recommended.    Sincerely,    [facility]                  Follow-ups after your visit        Your next 10 appointments already scheduled     Apr 24, 2018  2:30 PM CDT   (Arrive by 2:15 PM)   RETURN HEART FAILURE with Asim Altamirano MD   [facility] ([facility])    [address] [place] [address]  [address] [place] [address] [phone]            Oct 09, 2018 10:30 AM CDT   (Arrive by 10:15 AM)   RETURN HEART FAILURE with Asim Altamirano MD   [facility] ([facility])    [address] [place] [address]  [address] [place] [address] [phone]              Who to contact     If you have questions or need follow up information about today's clinic visit or your schedule please contact [facility] directly at [phone].  Normal or non-critical lab and  "imaging results will be communicated to you by MyChart, letter or phone within 4 business days after the clinic has received the results. If you do not hear from us within 7 days, please contact the clinic through [employer] or phone. If you have a critical or abnormal lab result, we will notify you by phone as soon as possible.  Submit refill requests through [employer] or call your pharmacy and they will forward the refill request to us. Please allow 3 business days for your refill to be completed.          Additional Information About Your Visit        [employer] Information     [employer] lets you send messages to your doctor, view your test results, renew your prescriptions, schedule appointments and more. To sign up, go to [email or web address] . Click on \"Log in\" on the left side of the screen, which will take you to the Welcome page. Then click on \"Sign up Now\" on the right side of the page.     You will be asked to enter the access code listed below, as well as some personal information. Please follow the directions to create your username and password.     Your access code is: [email or web address]  Expires: 2018  8:16 AM     Your access code will  in 90 days. If you need help or a new code, please call your [place] clinic or [phone].        Care EveryWhere ID     This is your Care EveryWhere ID. This could be used by other organizations to access your [place] medical records  [id number]        Your Vitals Were     Pulse Height Pulse Oximetry BMI (Body Mass Index)          90 1.588 m (5' 2.5\") 100% 21.78 kg/m2         Blood Pressure from Last 3 Encounters:   18 110/74   17 96/58   17 113/67    Weight from Last 3 Encounters:   18 54.9 kg (121 lb)   17 54.9 kg (121 lb)   17 55.3 kg (122 lb)              Today, you had the following     No orders found for display       Primary Care Provider Office Phone # Fax #    Romelia Parker [phone]       [address] [facility] [address]  [facility] [address]        Equal Access to Services     MONTYYAQUELIN AIDA : Hadii malik ku hadremington Soute, waaxda luqadaha, qaybta kapremada est[place], waxyen henry emilyshar elizabeth bobmic campoverde . So [facility] [phone].    ATENCIÓN: Si habla español, tiene a thomas disposición servicios gratuitos de asistencia lingüística. [facility] [phone].    We comply with applicable federal civil rights laws and [place] laws. We do not discriminate on the basis of race, color, national origin, age, disability, sex, sexual orientation, or gender identity.            Thank you!     Thank you for choosing [facility]  for your care. Our goal is always to provide you with excellent care. Hearing back from our patients is one way we can continue to improve our services. Please take a few minutes to complete the written survey that you may receive in the mail after your visit with us. Thank you!             Your Updated Medication List - Protect others around you: Learn how to safely use, store and throw away your medicines at www.disposemymeds.org.          This list is accurate as of 4/24/18  2:29 PM.  Always use your most recent med list.                   Brand Name Dispense Instructions for use Diagnosis    apixaban ANTICOAGULANT 2.5 MG tablet    ELIQUIS    180 tablet    Take 1 tablet (2.5 mg) by mouth 2 times daily        ASPIRIN PO      Take 81 mg by mouth daily        dorzolamide-timolol 2-0.5 % ophthalmic solution    COSOPT     Place 1 drop Into the left eye 2 times daily        metoprolol succinate 25 MG 24 hr tablet    TOPROL-XL    45 tablet    Take 0.5 tablets (12.5 mg) by mouth daily    Mitral valve insufficiency, unspecified etiology       travoprost Z (benzalkonium) 0.004 % ophthalmic solution    TRAVATAN Z     Place 1 drop into both eyes every evening        VIGAMOX 0.5 % ophthalmic solution   Generic drug:  moxifloxacin           zinc sulfate 220 (50 Zn) MG capsule    ZINCATE     Take 220 mg by  mouth 2 times daily

## 2018-04-24 NOTE — MR AVS SNAPSHOT
"              After Visit Summary   4/24/2018    Frannie Roa    MRN: [id number]           Patient Information     Date Of Birth          1935        Visit Information        Provider Department      4/24/2018 2:30 PM Asim Altamirano MD [facility]        Today's Diagnoses     Myxomatous mitral valve regurgitation    -  1       Follow-ups after your visit        Your next 10 appointments already scheduled     Oct 09, 2018 10:30 AM CDT   (Arrive by 10:15 AM)   RETURN HEART FAILURE with Asim Altamirano MD   [facility] ([facility])    [address] [place] [address] [phone]              Who to contact     If you have questions or need follow up information about today's clinic visit or your schedule please contact [facility] directly at [phone].  Normal or non-critical lab and imaging results will be communicated to you by [employer], letter or phone within 4 business days after the clinic has received the results. If you do not hear from us within 7 days, please contact the clinic through [employer] or phone. If you have a critical or abnormal lab result, we will notify you by phone as soon as possible.  Submit refill requests through [employer] or call your pharmacy and they will forward the refill request to us. Please allow 3 business days for your refill to be completed.          Additional Information About Your Visit        [employer] Information     [employer] lets you send messages to your doctor, view your test results, renew your prescriptions, schedule appointments and more. To sign up, go to [email or web address] . Click on \"Log in\" on the left side of the screen, which will take you to the Welcome page. Then click on \"Sign up Now\" on the right side of the page.     You will be asked to enter the access code listed below, as well as some personal information. Please follow the directions to create your " "username and password.     Your access code is: [email or web address]  Expires: 2018  8:16 AM     Your access code will  in 90 days. If you need help or a new code, please call your [place] clinic or [phone].        Care EveryWhere ID     This is your Care EveryWhere ID. This could be used by other organizations to access your [place] medical records  [id number]        Your Vitals Were     Pulse Height Pulse Oximetry BMI (Body Mass Index)          90 1.588 m (5' 2.5\") 100% 21.78 kg/m2         Blood Pressure from Last 3 Encounters:   18 110/74   18 110/74   17 96/58    Weight from Last 3 Encounters:   18 54.9 kg (121 lb)   18 54.9 kg (121 lb)   17 54.9 kg (121 lb)              Today, you had the following     No orders found for display       Primary Care Provider Office Phone # Fax #    Romelia Parker [phone]       [address] [facility]   [facility] [address]        Equal Access to Services     [facility]: Hadii aad ku hadasho Soomaali, waaxda luqadaha, qaybta kaalmada adeegyada, rama campoverde . So [facility] [phone].    ATENCIÓN: Si habla español, tiene a thomas disposición servicios gratuitos de asistencia lingüística. [facility] [phone].    We comply with applicable federal civil rights laws and [place] laws. We do not discriminate on the basis of race, color, national origin, age, disability, sex, sexual orientation, or gender identity.            Thank you!     Thank you for choosing [facility]  for your care. Our goal is always to provide you with excellent care. Hearing back from our patients is one way we can continue to improve our services. Please take a few minutes to complete the written survey that you may receive in the mail after your visit with us. Thank you!             Your Updated Medication List - Protect others around you: Learn how to safely use, store and throw away your medicines " at www.disposemymeds.org.          This list is accurate as of 4/24/18 11:59 PM.  Always use your most recent med list.                   Brand Name Dispense Instructions for use Diagnosis    apixaban ANTICOAGULANT 2.5 MG tablet    ELIQUIS    180 tablet    Take 1 tablet (2.5 mg) by mouth 2 times daily        ASPIRIN PO      Take 81 mg by mouth daily        dorzolamide-timolol 2-0.5 % ophthalmic solution    COSOPT     Place 1 drop Into the left eye 2 times daily        metoprolol succinate 25 MG 24 hr tablet    TOPROL-XL    45 tablet    Take 0.5 tablets (12.5 mg) by mouth daily    Mitral valve insufficiency, unspecified etiology       travoprost Z (benzalkonium) 0.004 % ophthalmic solution    TRAVATAN Z     Place 1 drop into both eyes every evening        VIGAMOX 0.5 % ophthalmic solution   Generic drug:  moxifloxacin           zinc sulfate 220 (50 Zn) MG capsule    ZINCATE     Take 220 mg by mouth 2 times daily

## 2018-04-24 NOTE — NURSING NOTE
Chief Complaint   Patient presents with     Follow Up For     reason for visit: 6 month f/u     Vitals were taken and medications were reconciled. .    ZACHARIAH Buenrostro  1:24 PM
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
## 2018-05-04 LAB — FIO2-PRE: 21 %

## 2018-05-22 DIAGNOSIS — I48.0 PAROXYSMAL ATRIAL FIBRILLATION (H): ICD-10-CM

## 2018-05-22 DIAGNOSIS — I34.0 MITRAL VALVE INSUFFICIENCY, UNSPECIFIED ETIOLOGY: ICD-10-CM

## 2018-05-22 DIAGNOSIS — I47.10 SVT (SUPRAVENTRICULAR TACHYCARDIA) (H): Primary | ICD-10-CM

## 2018-05-22 RX ORDER — METOPROLOL SUCCINATE 25 MG/1
12.5 TABLET, EXTENDED RELEASE ORAL DAILY
Qty: 45 TABLET | Refills: 3 | Status: SHIPPED | OUTPATIENT
Start: 2018-05-22

## 2018-06-29 ENCOUNTER — TRANSFERRED RECORDS (OUTPATIENT)
Dept: HEALTH INFORMATION MANAGEMENT | Facility: CLINIC | Age: 83
End: 2018-06-29

## 2018-07-27 ENCOUNTER — TRANSFERRED RECORDS (OUTPATIENT)
Dept: HEALTH INFORMATION MANAGEMENT | Facility: CLINIC | Age: 83
End: 2018-07-27

## 2018-09-11 ENCOUNTER — TRANSFERRED RECORDS (OUTPATIENT)
Dept: HEALTH INFORMATION MANAGEMENT | Facility: CLINIC | Age: 83
End: 2018-09-11

## 2018-09-24 NOTE — PROGRESS NOTES
Asim Altamirano M.D.  Cardiovascular Medicine    I personally saw and examined this patient, discussed care with housestaff and other consultants, reviewed current laboratories and imaging studies, and conveyed impression and diagnostic/therapeutic plan to patient.    Problem List  1. Mitral regurgitation  2. MitraClip  3. Paroxysmal atrial fibrillation  4. Pulmonary venous hypertension/resolved  5. Aortic stenosis/ estimated gradient 14  6. Diastolic relaxation abnormality  History    Patient returns for follow-up.  There is no interim history of PND, orthopnea, ankle edema, palpitation, pre-syncope.  She is functional class I for her age.  She has had no bleeding abnormalities. She feels tired going up hills and must stop and rest but does not complain of chest pain or shortness of breath.  No edema, no pre-syncope or syncope.    Objective    PERRLA, EOM full, normal gait and station, normal mentation, skin without lesions, chest is clear, no evidence of right or left ventricular overactivity, sternum stable, no carotid bruits, regular rhythm, S1, S2, aortic flow murmur, mitral regurgitation murmur to left scapula abdomen without tenderness, rebound guarding or masses, no edema, no focal neurologic defects. She complains of cold extremities and unlikely to be secondary to metoprolol.   No S4, no lefit      Wt Readings from Last 5 Encounters:   04/24/18 54.9 kg (121 lb)   04/24/18 54.9 kg (121 lb)   09/01/17 54.9 kg (121 lb)   08/28/17 55.3 kg (122 lb)   08/22/17 55.8 kg (123 lb)       Meds  Current Outpatient Prescriptions   Medication     apixaban ANTICOAGULANT (ELIQUIS) 2.5 MG tablet     dorzolamide-timolol (COSOPT) 2-0.5 % ophthalmic solution     metoprolol succinate (TOPROL-XL) 25 MG 24 hr tablet     travoprost Z, benzalkonium, (TRAVATAN Z) 0.004 % ophthalmic solution     zinc sulfate (ZINCATE) 220 MG capsule     ASPIRIN PO     VIGAMOX 0.5 % ophthalmic solution     No current facility-administered medications  for this visit.      Labs  Results for WILL ESTRADA (MRN [id number]) as of 4/24/2018 14:15   Ref. Range 7/14/2017 08:49 4/24/2018 00:00 4/24/2018 09:50 4/24/2018 10:42 4/24/2018 12:23   Sodium Latest Ref Range: 133 - 144 mmol/L 141  139     Potassium Latest Ref Range: 3.4 - 5.3 mmol/L 4.4  4.3     Chloride Latest Ref Range: 94 - 109 mmol/L 105  104     Carbon Dioxide Latest Ref Range: 20 - 32 mmol/L 29  31     Urea Nitrogen Latest Ref Range: 7 - 30 mg/dL 15  13     Creatinine Latest Ref Range: 0.52 - 1.04 mg/dL 0.69  0.64     GFR Estimate Latest Ref Range: >60 mL/min/1.7m2 81  89     GFR Estimate If Black Latest Ref Range: >60 mL/min/1.7m2 >90...  >90     Calcium Latest Ref Range: 8.5 - 10.1 mg/dL 8.7  9.0     Anion Gap Latest Ref Range: 3 - 14 mmol/L 7  4     Albumin Latest Ref Range: 3.4 - 5.0 g/dL 3.7       Protein Total Latest Ref Range: 6.8 - 8.8 g/dL 7.0       Bilirubin Total Latest Ref Range: 0.2 - 1.3 mg/dL 0.5       Alkaline Phosphatase Latest Ref Range: 40 - 150 U/L 79       ALT Latest Ref Range: 0 - 50 U/L 32       AST Latest Ref Range: 0 - 45 U/L 33       Cholesterol Latest Ref Range: <200 mg/dL 253 (H)       HDL Cholesterol Latest Ref Range: >49 mg/dL 88       LDL Cholesterol Calculated Latest Ref Range: <100 mg/dL 154 (H)       Non HDL Cholesterol Latest Ref Range: <130 mg/dL 165 (H)       Triglycerides Latest Ref Range: <150 mg/dL 54       Glucose Latest Ref Range: 70 - 99 mg/dL 100 (H)  94     WBC Latest Ref Range: 4.0 - 11.0 10e9/L   6.6     Hemoglobin Latest Ref Range: 11.7 - 15.7 g/dL   12.5     Hematocrit Latest Ref Range: 35.0 - 47.0 %   38.7     Platelet Count Latest Ref Range: 150 - 450 10e9/L   344     RBC Count Latest Ref Range: 3.8 - 5.2 10e12/L   4.30     MCV Latest Ref Range: 78 - 100 fl   90     MCH Latest Ref Range: 26.5 - 33.0 pg   29.1     MCHC Latest Ref Range: 31.5 - 36.5 g/dL   32.3     RDW Latest Ref Range: 10.0 - 15.0 %   14.3     Coronary Angiography  Left Heart  Catheterization     PHYSICIANS:  Attending Physician: Shailesh Lay MD  Interventional Cardiology Fellow: Branden Solomon MD  Cardiology Fellow: Audra Chaves     INDICATION:  Frannie Roa is a 81 year old female with risk factor profile (-) HTN, (-) DM, (+) hypercholesterolemia, (-) tobacco use, (-) fam Hx premature CAD, who presents on an elective basis. The clinical presentation was Asymptomatic with regard to anginal equivalents. She does endorse dyspnea with climbing stairs. The patient is being evaluated for severe mitral regurgitation 2/2 mitral prolapse. Her last coronary angiogram was in 2014, which showed evidence of mild disease :25% stenosis within the LAD.      DESCRIPTION:  1. Consent obtained with discussion of risks.  All questions were answered.  2. Sterile prep and procedure.  3. Location with Sheaths:   Rt Femoral Arterial  4 Fr 25 cm [long]  Rt Femoral Venous  7 Fr 25 cm [long]  4. Access: Local anesthetic with lidocaine.  A micropuncture 21 guage needle with ultrasound guidance was used to establish vascular access using a modified Seldinger technique.  5. Diagnostic Catheters:   4 Fr  JL 4, 3DRC  6. Estimated blood loss: < 5 ml     MEDICATIONS:  The procedure was performed under conscious sedation for 63 minutes from 11:17 to 12:20.  Benadryl was administered.  Heart rate, BP, respiration, oxygen saturation and patient responses were monitored throughout the procedure with the assistance of the RN under my supervision.     >> Antiplatelet Therapy: ASA 81 mg      Procedures:     HEMODYNAMICS:  BSA 1.6  1. HR 59 bpm  2. /61/88 mmHg  3. RA 16/13/12   4. RV 59/14  5. PA 57/22/37   6. PCW 23/31/22   7. PA sat 66.3%   8. PCW sat --%  9. Hgb 11.5 g/dL   10. Harlan CO 3.8   11. Harlan CI 2.4   12. TD CO 3.1   13. TD CI 2.0   14. PVR 4.8      CORONARY ANGIOGRAM:   1. Both coronary arteries arise from their respective cusps.  2. Dominance: Right  3. The LM is without angiographic evidence of  disease.   4. LAD: Type 3 [LAD supplies the entire apex]. The LAD is a moderate caliber, tortuous vessel that gives rise to septal perforators, medium sized D1 and small D2.  There is <25% disease within the mid LAD and minimal luminal irregularities throughout the remainder of the vessel.  5. LCX is a medium caliber vessel that gives rise to medium sized OM1 and OM2 before terminating in the AV groove as a diminutive vessel. There are mild luminal irregularities.    6. RCA is a large, dominant vessel that gives rise to PL branches and supplies PDA. There are mild luminal irregularities throughout.     Additional Comment:   On fluoro images, significant mitral annular calcium is seen.        LEFT HEART CATHETERIZATION:  1. LVEDP 19 mmHg.  2. Left ventricular and post capillary wedge pressures were measured simultaneously in diastole: gradient across the mitral valve:4.8 mmHg with calculated valve area of 1.44 cm^2  3. No gradient across the aortic valve on pull back.    Imaging     She has evidence of aortic valve stenosis that does not require intervention at this time.     Assessment/Plan     1. Her hemoglobin has dropped and irons appear low.  She is on supplementation.  She has recent colonoscopy that was negative.  Follow-up iron as this may account for her coldness and shortness of breath.   2. Her cholesterol has dropped to LDL 75  3. She has has no bleeding complications from NWOAC  4.  She should have follow-up echocardiogram 6 months after the current one  5. Iron, soluble transferrin, hemoglobin checked

## 2018-09-25 ENCOUNTER — OFFICE VISIT (OUTPATIENT)
Dept: CARDIOLOGY | Facility: CLINIC | Age: 83
End: 2018-09-25
Attending: INTERNAL MEDICINE
Payer: MEDICARE

## 2018-09-25 ENCOUNTER — APPOINTMENT (OUTPATIENT)
Dept: LAB | Facility: CLINIC | Age: 83
End: 2018-09-25
Payer: MEDICARE

## 2018-09-25 VITALS
HEIGHT: 63 IN | OXYGEN SATURATION: 97 % | BODY MASS INDEX: 21.76 KG/M2 | DIASTOLIC BLOOD PRESSURE: 79 MMHG | SYSTOLIC BLOOD PRESSURE: 130 MMHG | HEART RATE: 71 BPM | WEIGHT: 122.8 LBS

## 2018-09-25 DIAGNOSIS — D64.9 ANEMIA, UNSPECIFIED TYPE: ICD-10-CM

## 2018-09-25 DIAGNOSIS — I48.0 PAROXYSMAL ATRIAL FIBRILLATION (H): Primary | ICD-10-CM

## 2018-09-25 LAB
CRP SERPL-MCNC: 22.3 MG/L (ref 0–8)
HGB BLD-MCNC: 10.9 G/DL (ref 11.7–15.7)
IRON SATN MFR SERPL: 12 % (ref 15–46)
IRON SERPL-MCNC: 40 UG/DL (ref 35–180)
TIBC SERPL-MCNC: 320 UG/DL (ref 240–430)

## 2018-09-25 PROCEDURE — 36415 COLL VENOUS BLD VENIPUNCTURE: CPT | Performed by: INTERNAL MEDICINE

## 2018-09-25 PROCEDURE — 86140 C-REACTIVE PROTEIN: CPT | Performed by: INTERNAL MEDICINE

## 2018-09-25 PROCEDURE — 83550 IRON BINDING TEST: CPT | Performed by: INTERNAL MEDICINE

## 2018-09-25 PROCEDURE — 83540 ASSAY OF IRON: CPT | Performed by: INTERNAL MEDICINE

## 2018-09-25 PROCEDURE — 85018 HEMOGLOBIN: CPT | Performed by: INTERNAL MEDICINE

## 2018-09-25 PROCEDURE — 99214 OFFICE O/P EST MOD 30 MIN: CPT | Mod: ZP | Performed by: INTERNAL MEDICINE

## 2018-09-25 PROCEDURE — 84238 ASSAY NONENDOCRINE RECEPTOR: CPT | Performed by: INTERNAL MEDICINE

## 2018-09-25 PROCEDURE — G0463 HOSPITAL OUTPT CLINIC VISIT: HCPCS | Mod: ZF

## 2018-09-25 ASSESSMENT — PAIN SCALES - GENERAL: PAINLEVEL: NO PAIN (0)

## 2018-09-25 NOTE — NURSING NOTE
Labs: See ordered labs for today. . Patient demonstrated understanding of this information and agreed to call with further questions or concerns.   Med Reconcile: Reviewed and verified all current medications with the patient. The updated medication list was printed and given to the patient.  Return Appointment: PRN, moving to [place]. Patient given instructions regarding scheduling next clinic visit. Patient demonstrated understanding of this information and agreed to call with further questions or concerns.  Patient stated she understood all health information given and agreed to call with further questions or concerns.

## 2018-09-25 NOTE — NURSING NOTE
Chief Complaint   Patient presents with     Follow Up For     6 month return      Vitals were taken and medications were reconciled.    Radha Nava RMA  8:31 AM

## 2018-09-25 NOTE — MR AVS SNAPSHOT
After Visit Summary   9/25/2018    Frannie Roa    MRN: [id number]           Patient Information     Date Of Birth          1935        Visit Information        Provider Department      9/25/2018 8:30 AM Asim Altamirano MD [facility]        Today's Diagnoses     Paroxysmal atrial fibrillation (H)    -  1    Anemia, unspecified type          Care Instructions    Thank you for your visit today.  Please call me with any questions or concerns.   Shiva Hammonds RN  Cardiology Care Coordinator    [phone], press option 1 then option 3    1. Her hemoglobin has dropped and irons appear low.  She is on supplementation.  She has recent colonoscopy that was negative.  Follow-up iron as this may account for her coldness and shortness of breath.   2. Her cholesterol has dropped to LDL 75  3. She has has no bleeding complications from NWOAC  4.  She should have follow-up echocardiogram 6 months after the current one  5. Iron, soluble transferrin, hemoglobin checked            Follow-ups after your visit        Additional Services     Follow-Up with Cardiologist       Please schedule labs for today. No follow up clinic visit needed.                  Your next 10 appointments already scheduled     Sep 25, 2018 10:45 AM CDT   Lab with  LAB   [facility] ([facility])    [address] [address] [phone]              Future tests that were ordered for you today     Open Future Orders        Priority Expected Expires Ordered    Follow-Up with Cardiologist Routine 9/25/2018 12/24/2018 9/25/2018            Who to contact     If you have questions or need follow up information about today's clinic visit or your schedule please contact [facility] directly at [phone].  Normal or non-critical lab and imaging results will be communicated to you by MyChart, letter or phone within 4 business days after the clinic has  "received the results. If you do not hear from us within 7 days, please contact the clinic through Figure 1 or phone. If you have a critical or abnormal lab result, we will notify you by phone as soon as possible.  Submit refill requests through Figure 1 or call your pharmacy and they will forward the refill request to us. Please allow 3 business days for your refill to be completed.          Additional Information About Your Visit        Figure 1 Information     Figure 1 lets you send messages to your doctor, view your test results, renew your prescriptions, schedule appointments and more. To sign up, go to [email or web address] . Click on \"Log in\" on the left side of the screen, which will take you to the Welcome page. Then click on \"Sign up Now\" on the right side of the page.     You will be asked to enter the access code listed below, as well as some personal information. Please follow the directions to create your username and password.     Your access code is: [email or web address]  Expires: 12/10/2018  6:30 AM     Your access code will  in 90 days. If you need help or a new code, please call your [facility] clinic or [phone].        Care EveryWhere ID     This is your Care EveryWhere ID. This could be used by other organizations to access your [facility] medical records  [id number]        Your Vitals Were     Pulse Height Pulse Oximetry BMI (Body Mass Index)          71 1.6 m (5' 3\") 97% 21.75 kg/m2         Blood Pressure from Last 3 Encounters:   18 130/79   18 110/74   18 110/74    Weight from Last 3 Encounters:   18 55.7 kg (122 lb 12.8 oz)   18 54.9 kg (121 lb)   18 54.9 kg (121 lb)              We Performed the Following     CRP, inflammation     Hemoglobin     Iron and iron binding capacity     Soluble transferrin receptor        Primary Care Provider Office Phone # Fax #    Romelia Parker [phone]       [address] " [address]        Equal Access to Services     [facility]: Hadii malik del rio yamila Saldivar, waowenda luqadaha, qaybta kaarmand estelacatalinacipriano, rama henry jiménezedytamic dewitt. So [facility] [phone].    ATENCIÓN: Si habla español, tiene a thomas disposición servicios gratuitos de asistencia lingüística. Lyssaame al [phone].    We comply with applicable federal civil rights laws and [place] laws. We do not discriminate on the basis of race, color, national origin, age, disability, sex, sexual orientation, or gender identity.            Thank you!     Thank you for choosing [facility]  for your care. Our goal is always to provide you with excellent care. Hearing back from our patients is one way we can continue to improve our services. Please take a few minutes to complete the written survey that you may receive in the mail after your visit with us. Thank you!             Your Updated Medication List - Protect others around you: Learn how to safely use, store and throw away your medicines at www.disposemymeds.org.          This list is accurate as of 9/25/18  9:24 AM.  Always use your most recent med list.                   Brand Name Dispense Instructions for use Diagnosis    apixaban ANTICOAGULANT 2.5 MG tablet    ELIQUIS    180 tablet    Take 1 tablet (2.5 mg) by mouth 2 times daily    SVT (supraventricular tachycardia) (H), Paroxysmal atrial fibrillation (H)       ASPIRIN PO      Take 81 mg by mouth daily        dorzolamide-timolol 2-0.5 % ophthalmic solution    COSOPT     Place 1 drop Into the left eye 2 times daily        metoprolol succinate 25 MG 24 hr tablet    TOPROL-XL    45 tablet    Take 0.5 tablets (12.5 mg) by mouth daily    SVT (supraventricular tachycardia) (H), Paroxysmal atrial fibrillation (H)       travoprost Z (benzalkonium) 0.004 % ophthalmic solution    TRAVATAN Z     Place 1 drop into both eyes every evening        VIGAMOX 0.5 % ophthalmic solution   Generic drug:  moxifloxacin            zinc sulfate 220 (50 Zn) MG capsule    ZINCATE     Take 220 mg by mouth 2 times daily

## 2018-09-25 NOTE — LETTER
9/25/2018      RE: Frannie Christystein  [address]  [facility] [address]       Dear Colleague,    Thank you for the opportunity to participate in the care of your patient, Frannie Roa, at the [facility]. Please see a copy of my visit note below.    Asim Altamirano M.D.  Cardiovascular Medicine    I personally saw and examined this patient, discussed care with housestaff and other consultants, reviewed current laboratories and imaging studies, and conveyed impression and diagnostic/therapeutic plan to patient.    Problem List  1. Mitral regurgitation  2. MitraClip  3. Paroxysmal atrial fibrillation  4. Pulmonary venous hypertension/resolved  5. Aortic stenosis/ estimated gradient 14  6. Diastolic relaxation abnormality  History    Patient returns for follow-up.  There is no interim history of PND, orthopnea, ankle edema, palpitation, pre-syncope.  She is functional class I for her age.  She has had no bleeding abnormalities. She feels tired going up hills and must stop and rest but does not complain of chest pain or shortness of breath.  No edema, no pre-syncope or syncope.    Objective    PERRLA, EOM full, normal gait and station, normal mentation, skin without lesions, chest is clear, no evidence of right or left ventricular overactivity, sternum stable, no carotid bruits, regular rhythm, S1, S2, aortic flow murmur, mitral regurgitation murmur to left scapula abdomen without tenderness, rebound guarding or masses, no edema, no focal neurologic defects. She complains of cold extremities and unlikely to be secondary to metoprolol.   No S4, no lefit      Wt Readings from Last 5 Encounters:   04/24/18 54.9 kg (121 lb)   04/24/18 54.9 kg (121 lb)   09/01/17 54.9 kg (121 lb)   08/28/17 55.3 kg (122 lb)   08/22/17 55.8 kg (123 lb)       Meds  Current Outpatient Prescriptions   Medication     apixaban ANTICOAGULANT (ELIQUIS) 2.5 MG tablet      dorzolamide-timolol (COSOPT) 2-0.5 % ophthalmic solution     metoprolol succinate (TOPROL-XL) 25 MG 24 hr tablet     travoprost Z, benzalkonium, (TRAVATAN Z) 0.004 % ophthalmic solution     zinc sulfate (ZINCATE) 220 MG capsule     ASPIRIN PO     VIGAMOX 0.5 % ophthalmic solution     No current facility-administered medications for this visit.      Labs  Results for WILL ESTRADA (MRN [id number]) as of 4/24/2018 14:15   Ref. Range 7/14/2017 08:49 4/24/2018 00:00 4/24/2018 09:50 4/24/2018 10:42 4/24/2018 12:23   Sodium Latest Ref Range: 133 - 144 mmol/L 141  139     Potassium Latest Ref Range: 3.4 - 5.3 mmol/L 4.4  4.3     Chloride Latest Ref Range: 94 - 109 mmol/L 105  104     Carbon Dioxide Latest Ref Range: 20 - 32 mmol/L 29  31     Urea Nitrogen Latest Ref Range: 7 - 30 mg/dL 15  13     Creatinine Latest Ref Range: 0.52 - 1.04 mg/dL 0.69  0.64     GFR Estimate Latest Ref Range: >60 mL/min/1.7m2 81  89     GFR Estimate If Black Latest Ref Range: >60 mL/min/1.7m2 >90...  >90     Calcium Latest Ref Range: 8.5 - 10.1 mg/dL 8.7  9.0     Anion Gap Latest Ref Range: 3 - 14 mmol/L 7  4     Albumin Latest Ref Range: 3.4 - 5.0 g/dL 3.7       Protein Total Latest Ref Range: 6.8 - 8.8 g/dL 7.0       Bilirubin Total Latest Ref Range: 0.2 - 1.3 mg/dL 0.5       Alkaline Phosphatase Latest Ref Range: 40 - 150 U/L 79       ALT Latest Ref Range: 0 - 50 U/L 32       AST Latest Ref Range: 0 - 45 U/L 33       Cholesterol Latest Ref Range: <200 mg/dL 253 (H)       HDL Cholesterol Latest Ref Range: >49 mg/dL 88       LDL Cholesterol Calculated Latest Ref Range: <100 mg/dL 154 (H)       Non HDL Cholesterol Latest Ref Range: <130 mg/dL 165 (H)       Triglycerides Latest Ref Range: <150 mg/dL 54       Glucose Latest Ref Range: 70 - 99 mg/dL 100 (H)  94     WBC Latest Ref Range: 4.0 - 11.0 10e9/L   6.6     Hemoglobin Latest Ref Range: 11.7 - 15.7 g/dL   12.5     Hematocrit Latest Ref Range: 35.0 - 47.0 %   38.7     Platelet Count  Latest Ref Range: 150 - 450 10e9/L   344     RBC Count Latest Ref Range: 3.8 - 5.2 10e12/L   4.30     MCV Latest Ref Range: 78 - 100 fl   90     MCH Latest Ref Range: 26.5 - 33.0 pg   29.1     MCHC Latest Ref Range: 31.5 - 36.5 g/dL   32.3     RDW Latest Ref Range: 10.0 - 15.0 %   14.3     Coronary Angiography  Left Heart Catheterization     PHYSICIANS:  Attending Physician: Shailesh Lay MD  Interventional Cardiology Fellow: Branden Solomon MD  Cardiology Fellow: Audra Chaves     INDICATION:  Frannie Roa is a 81 year old female with risk factor profile (-) HTN, (-) DM, (+) hypercholesterolemia, (-) tobacco use, (-) fam Hx premature CAD, who presents on an elective basis. The clinical presentation was Asymptomatic with regard to anginal equivalents. She does endorse dyspnea with climbing stairs. The patient is being evaluated for severe mitral regurgitation 2/2 mitral prolapse. Her last coronary angiogram was in 2014, which showed evidence of mild disease :25% stenosis within the LAD.      DESCRIPTION:  1. Consent obtained with discussion of risks.  All questions were answered.  2. Sterile prep and procedure.  3. Location with Sheaths:   Rt Femoral Arterial  4 Fr 25 cm [long]  Rt Femoral Venous  7 Fr 25 cm [long]  4. Access: Local anesthetic with lidocaine.  A micropuncture 21 guage needle with ultrasound guidance was used to establish vascular access using a modified Seldinger technique.  5. Diagnostic Catheters:   4 Fr  JL 4, 3DRC  6. Estimated blood loss: < 5 ml     MEDICATIONS:  The procedure was performed under conscious sedation for 63 minutes from 11:17 to 12:20.  Benadryl was administered.  Heart rate, BP, respiration, oxygen saturation and patient responses were monitored throughout the procedure with the assistance of the RN under my supervision.     >> Antiplatelet Therapy: ASA 81 mg      Procedures:     HEMODYNAMICS:  BSA 1.6  1. HR 59 bpm  2. /61/88 mmHg  3. RA 16/13/12   4. RV 59/14  5. PA  57/22/37   6. PCW 23/31/22   7. PA sat 66.3%   8. PCW sat --%  9. Hgb 11.5 g/dL   10. Hralan CO 3.8   11. Harlan CI 2.4   12. TD CO 3.1   13. TD CI 2.0   14. PVR 4.8      CORONARY ANGIOGRAM:   1. Both coronary arteries arise from their respective cusps.  2. Dominance: Right  3. The LM is without angiographic evidence of disease.   4. LAD: Type 3 [LAD supplies the entire apex]. The LAD is a moderate caliber, tortuous vessel that gives rise to septal perforators, medium sized D1 and small D2.  There is <25% disease within the mid LAD and minimal luminal irregularities throughout the remainder of the vessel.  5. LCX is a medium caliber vessel that gives rise to medium sized OM1 and OM2 before terminating in the AV groove as a diminutive vessel. There are mild luminal irregularities.    6. RCA is a large, dominant vessel that gives rise to PL branches and supplies PDA. There are mild luminal irregularities throughout.     Additional Comment:   On fluoro images, significant mitral annular calcium is seen.        LEFT HEART CATHETERIZATION:  1. LVEDP 19 mmHg.  2. Left ventricular and post capillary wedge pressures were measured simultaneously in diastole: gradient across the mitral valve:4.8 mmHg with calculated valve area of 1.44 cm^2  3. No gradient across the aortic valve on pull back.    Imaging     She has evidence of aortic valve stenosis that does not require intervention at this time.     Assessment/Plan     1. Her hemoglobin has dropped and irons appear low.  She is on supplementation.  She has recent colonoscopy that was negative.  Follow-up iron as this may account for her coldness and shortness of breath.   2. Her cholesterol has dropped to LDL 75  3. She has has no bleeding complications from NWOAC  4.  She should have follow-up echocardiogram 6 months after the current one  5. Iron, soluble transferrin, hemoglobin checked    Please do not hesitate to contact me if you have any questions/concerns.     Sincerely,      Asim Altamirano MD

## 2018-09-25 NOTE — PATIENT INSTRUCTIONS
Thank you for your visit today.  Please call me with any questions or concerns.   Shiva Hammonds RN  Cardiology Care Coordinator    [phone], press option 1 then option 3    1. Her hemoglobin has dropped and irons appear low.  She is on supplementation.  She has recent colonoscopy that was negative.  Follow-up iron as this may account for her coldness and shortness of breath.   2. Her cholesterol has dropped to LDL 75  3. She has has no bleeding complications from NWOAC  4.  She should have follow-up echocardiogram 6 months after the current one  5. Iron, soluble transferrin, hemoglobin checked

## 2018-09-26 LAB — STFR SERPL-SCNC: 5 MG/L (ref 1.9–4.4)

## 2018-10-09 ENCOUNTER — CARE COORDINATION (OUTPATIENT)
Dept: CARDIOLOGY | Facility: CLINIC | Age: 83
End: 2018-10-09

## 2018-10-09 NOTE — PROGRESS NOTES
Patient was called and told that per Dr. Altamirano the Eloquis would not be the cause of her anemia. Provider recommends possible visit with Primary care provider and possible colonoscopy. All questions answered to patient's satisfaction. Per patient's request, labs printed and mailed to patient in [place].

## 2018-11-29 NOTE — ADDENDUM NOTE
Addended by: ANDREEA BANGURA on: 4/27/2017 03:59 PM     Modules accepted: SmartSet    
30-Nov-2018 00:30

## 2019-03-12 NOTE — PROGRESS NOTES
Spoke with Derrick at [facility], advised pt will be going to [facility] lab tomorrow for INR. Yadi Anderson RN     unknown

## 2023-03-29 NOTE — MR AVS SNAPSHOT
Frannie JC Janina   3/13/2017 2:00 PM   Anticoagulation Therapy Visit    Description:  81 year old female   Provider:  [facility]   Department:  [facility]           INR as of 3/13/2017     Today's INR 1.2!      Anticoagulation Summary as of 3/13/2017     INR goal 2.0-3.0   Today's INR 1.2!   Full instructions 3/15: 2.5 mg; Otherwise 5 mg every day   Next INR check 3/16/2017    Indications   Long-term (current) use of anticoagulants [Z79.01] [Z79.01]  Atrial fibrillation (H) [I48.91] [I48.91]         Your next Anticoagulation Clinic appointment(s)     Mar 16, 2017  3:00 PM CDT   [facility]ticoagulation Visit with [facility]   [facility]    [address] [facility] [address]  [facility] [address] [phone]              Contact Numbers     [facility] Phone Numbers:  Anticoagulation Clinic Appointments : [phone]  Anticoagulation Nurse: [phone]         March 2017 Details    Sun Mon Tue Wed Thu Fri Sat        1               2               3               4                 5               6               7               8               9               10               11                 12               13      5 mg   See details      14      5 mg         15      2.5 mg         16            17               18                 19               20               21               22               23               24               25                 26               27               28               29               30               31                 Date Details   03/13 This INR check       Date of next INR:  3/16/2017         How to take your warfarin dose     To take:  2.5 mg Take 0.5 of a 5 mg tablet.    To take:  5 mg Take 1 of the 5 mg tablets.           
Cigarettes     Start date: 3/28/1996     Quit date: 3/1/2016     Years since quittin.0    Smokeless tobacco: Never   Vaping Use    Vaping Use: Never used   Substance and Sexual Activity    Alcohol use: No     Comment: rarely    Drug use: No    Sexual activity: Yes     Partners: Male   Other Topics Concern    Not on file   Social History Narrative    Not on file     Social Determinants of Health     Financial Resource Strain: Low Risk     Difficulty of Paying Living Expenses: Not hard at all   Food Insecurity: No Food Insecurity    Worried About [address] [employer] in the Last Year: Never true    [address] [employer] in the Last Year: Never true   Transportation Needs: Unknown    Lack of Transportation (Medical): Not on file    Lack of Transportation (Non-Medical): No   Physical Activity: Insufficiently Active    Days of Exercise per Week: 2 days    Minutes of Exercise per Session: 20 min   Stress: Not on file   Social Connections: Not on file   Intimate Partner Violence: Not At Risk    Fear of Current or Ex-Partner: No    Emotionally Abused: No    Physically Abused: No    Sexually Abused: No   Housing Stability: Unknown    Unable to Pay for Housing in the Last Year: Not on file    Number of Places Lived in the Last Year: Not on file    Unstable Housing in the Last Year: No     Review of Systems: Please see today's intake form for complete review of systems. PHYSICAL EXAM  Gen: awake, alert, oriented  HEENT: atraumatic, normocephalic  Neck: supple  Resp: equal chest rise bilaterally, no audible wheezes, no accessory muscle use. CV: Lower extremities warm and well perfused. Abd: soft, nontender   Focused examination of the right upper extremity: No cuts, open wounds, or abrasions to the shoulder. No obvious swelling. No bruising or ecchymosis. There is no significant tenderness to palpation about the [facility] joint or bicipital groove. There is tenderness about the lateral/posterior lateral acromion.   Active equals

## 2023-12-09 NOTE — NURSING NOTE
"Chief Complaint   Patient presents with     Pre-Op Exam       Initial /66 (BP Location: Right arm, Patient Position: Sitting, Cuff Size: Adult Regular)  Pulse 78  Temp 97.7  F (36.5  C) (Oral)  Ht 5' 2.5\" (1.588 m)  Wt 122 lb 8 oz (55.6 kg)  SpO2 98%  BMI 22.05 kg/m2 Estimated body mass index is 22.05 kg/(m^2) as calculated from the following:    Height as of this encounter: 5' 2.5\" (1.588 m).    Weight as of this encounter: 122 lb 8 oz (55.6 kg).  Medication Reconciliation: complete    "
No

## (undated) DEVICE — EYE SHIELD PLASTIC

## (undated) DEVICE — GLOVE PROTEXIS W/NEU-THERA 7.5  2D73TE75

## (undated) DEVICE — LINEN TOWEL PACK X5 5464

## (undated) DEVICE — PACK CATARACT CUSTOM SO DALE SEY32CTFCX

## (undated) DEVICE — EYE PACK BVI READYPAK KIT #3

## (undated) DEVICE — EYE KNIFE SLIT XSTAR VISITEC 2.5MM 45DEG BEVEL UP 373725

## (undated) DEVICE — EYE SOL BSS 500ML

## (undated) DEVICE — GLOVE PROTEXIS MICRO 6.0  2D73PM60

## (undated) DEVICE — GLOVE PROTEXIS MICRO 7.0  2D73PM70

## (undated) DEVICE — EYE PACK CUSTOM ANTERIOR 30DEG TIP CENTURION PPK6682-04

## (undated) DEVICE — GOWN LG DISP 9515

## (undated) RX ORDER — FENTANYL CITRATE 50 UG/ML
INJECTION, SOLUTION INTRAMUSCULAR; INTRAVENOUS
Status: DISPENSED
Start: 2017-04-06

## (undated) RX ORDER — LIDOCAINE HYDROCHLORIDE 20 MG/ML
INJECTION, SOLUTION EPIDURAL; INFILTRATION; INTRACAUDAL; PERINEURAL
Status: DISPENSED
Start: 2017-07-24

## (undated) RX ORDER — DIPHENHYDRAMINE HYDROCHLORIDE 50 MG/ML
INJECTION INTRAMUSCULAR; INTRAVENOUS
Status: DISPENSED
Start: 2017-04-17

## (undated) RX ORDER — ASPIRIN 325 MG
TABLET ORAL
Status: DISPENSED
Start: 2017-05-08

## (undated) RX ORDER — CLINDAMYCIN PHOSPHATE 900 MG/50ML
INJECTION, SOLUTION INTRAVENOUS
Status: DISPENSED
Start: 2017-05-08

## (undated) RX ORDER — FENTANYL CITRATE 50 UG/ML
INJECTION, SOLUTION INTRAMUSCULAR; INTRAVENOUS
Status: DISPENSED
Start: 2017-04-17

## (undated) RX ORDER — HEPARIN SODIUM 1000 [USP'U]/ML
INJECTION, SOLUTION INTRAVENOUS; SUBCUTANEOUS
Status: DISPENSED
Start: 2017-04-17

## (undated) RX ORDER — HEPARIN SODIUM 1000 [USP'U]/ML
INJECTION, SOLUTION INTRAVENOUS; SUBCUTANEOUS
Status: DISPENSED
Start: 2017-05-08

## (undated) RX ORDER — NITROGLYCERIN 5 MG/ML
VIAL (ML) INTRAVENOUS
Status: DISPENSED
Start: 2017-04-17

## (undated) RX ORDER — ASPIRIN 325 MG
TABLET ORAL
Status: DISPENSED
Start: 2017-04-17

## (undated) RX ORDER — SODIUM CHLORIDE 9 MG/ML
INJECTION, SOLUTION INTRAVENOUS
Status: DISPENSED
Start: 2017-04-17

## (undated) RX ORDER — ONDANSETRON 2 MG/ML
INJECTION INTRAMUSCULAR; INTRAVENOUS
Status: DISPENSED
Start: 2017-07-24